# Patient Record
Sex: FEMALE | Race: WHITE | NOT HISPANIC OR LATINO | Employment: OTHER | URBAN - METROPOLITAN AREA
[De-identification: names, ages, dates, MRNs, and addresses within clinical notes are randomized per-mention and may not be internally consistent; named-entity substitution may affect disease eponyms.]

---

## 2018-04-24 RX ORDER — PRAVASTATIN SODIUM 40 MG/1
TABLET ORAL
Qty: 90 TABLET | Refills: 1 | Status: SHIPPED | OUTPATIENT
Start: 2018-04-24 | End: 2018-09-06 | Stop reason: SDUPTHER

## 2018-05-02 ENCOUNTER — TELEPHONE (OUTPATIENT)
Dept: FAMILY MEDICINE | Facility: CLINIC | Age: 68
End: 2018-05-02

## 2018-05-09 ENCOUNTER — OFFICE VISIT (OUTPATIENT)
Dept: FAMILY MEDICINE | Facility: CLINIC | Age: 68
End: 2018-05-09
Payer: MEDICARE

## 2018-05-09 VITALS
OXYGEN SATURATION: 100 % | TEMPERATURE: 99 F | BODY MASS INDEX: 25.11 KG/M2 | WEIGHT: 160 LBS | HEIGHT: 67 IN | RESPIRATION RATE: 16 BRPM | SYSTOLIC BLOOD PRESSURE: 104 MMHG | HEART RATE: 80 BPM | DIASTOLIC BLOOD PRESSURE: 62 MMHG

## 2018-05-09 DIAGNOSIS — K21.00 GASTROESOPHAGEAL REFLUX DISEASE WITH ESOPHAGITIS: Primary | ICD-10-CM

## 2018-05-09 PROCEDURE — 3078F DIAST BP <80 MM HG: CPT | Mod: CPTII,S$GLB,, | Performed by: FAMILY MEDICINE

## 2018-05-09 PROCEDURE — 99999 PR PBB SHADOW E&M-EST. PATIENT-LVL III: CPT | Mod: PBBFAC,,, | Performed by: FAMILY MEDICINE

## 2018-05-09 PROCEDURE — 3074F SYST BP LT 130 MM HG: CPT | Mod: CPTII,S$GLB,, | Performed by: FAMILY MEDICINE

## 2018-05-09 PROCEDURE — 99214 OFFICE O/P EST MOD 30 MIN: CPT | Mod: S$GLB,,, | Performed by: FAMILY MEDICINE

## 2018-05-09 RX ORDER — CEFDINIR 300 MG/1
CAPSULE ORAL
COMMUNITY
Start: 2018-05-08 | End: 2018-06-11 | Stop reason: ALTCHOICE

## 2018-05-09 RX ORDER — PANTOPRAZOLE SODIUM 40 MG/1
TABLET, DELAYED RELEASE ORAL
COMMUNITY
End: 2018-08-29

## 2018-05-09 RX ORDER — ESTRADIOL 0.5 MG/1
TABLET ORAL
COMMUNITY
End: 2020-01-13

## 2018-05-09 RX ORDER — LEVOTHYROXINE SODIUM 150 UG/1
TABLET ORAL
COMMUNITY
End: 2018-06-11 | Stop reason: SDUPTHER

## 2018-05-09 RX ORDER — VIT C/E/ZN/COPPR/LUTEIN/ZEAXAN 250MG-90MG
CAPSULE ORAL
COMMUNITY
End: 2020-01-13

## 2018-05-09 RX ORDER — METHYLPREDNISOLONE 4 MG/1
TABLET ORAL
COMMUNITY
Start: 2018-05-08 | End: 2018-06-11 | Stop reason: ALTCHOICE

## 2018-05-09 RX ORDER — FLUTICASONE PROPIONATE 50 MCG
SPRAY, SUSPENSION (ML) NASAL
COMMUNITY
Start: 2018-05-08 | End: 2018-06-11 | Stop reason: SDUPTHER

## 2018-05-09 RX ORDER — GABAPENTIN 300 MG/1
CAPSULE ORAL
COMMUNITY
Start: 2018-04-21 | End: 2018-08-17 | Stop reason: SDUPTHER

## 2018-05-09 RX ORDER — PROMETHAZINE HYDROCHLORIDE AND DEXTROMETHORPHAN HYDROBROMIDE 6.25; 15 MG/5ML; MG/5ML
5 SYRUP ORAL NIGHTLY
Qty: 240 ML | Refills: 1 | Status: SHIPPED | OUTPATIENT
Start: 2018-05-09 | End: 2018-06-11 | Stop reason: SINTOL

## 2018-05-09 RX ORDER — CALCIUM CARBONATE 500(1250)
TABLET,CHEWABLE ORAL
COMMUNITY
End: 2018-06-11 | Stop reason: ALTCHOICE

## 2018-05-09 NOTE — PROGRESS NOTES
"Subjective:       Patient ID: Kathi Head is a 67 y.o. female.    Chief Complaint: Gastroesophageal Reflux    Patient complains of dysphagia, mild, improved with ppi and carafate. However, she complains of coughing in the middle of the night, waking up to "spit out" clear fluid. No fever.      Review of Systems   Constitutional: Negative for activity change, appetite change, chills, fatigue and fever.   HENT: Negative for congestion, dental problem, facial swelling, nosebleeds, postnasal drip, sinus pain, sore throat, trouble swallowing and voice change.    Eyes: Negative for pain, discharge and visual disturbance.   Respiratory: Positive for cough. Negative for apnea, chest tightness and shortness of breath.    Cardiovascular: Negative for chest pain and palpitations.   Gastrointestinal: Negative for abdominal pain, blood in stool, constipation and nausea.   Endocrine: Negative for cold intolerance, polydipsia and polyuria.   Genitourinary: Negative for difficulty urinating, enuresis and flank pain.   Musculoskeletal: Negative for arthralgias and back pain.   Skin: Negative for color change.   Allergic/Immunologic: Negative for environmental allergies and immunocompromised state.   Neurological: Negative for dizziness and light-headedness.   Hematological: Negative for adenopathy.   Psychiatric/Behavioral: Negative for agitation, behavioral problems, decreased concentration and dysphoric mood. The patient is not nervous/anxious.    All other systems reviewed and are negative.        Reviewed family, medical, surgical, and social history.    Objective:      /62 (BP Location: Left arm, Patient Position: Sitting, BP Method: Medium (Automatic))   Pulse 80   Temp 98.5 °F (36.9 °C)   Resp 16   Ht 5' 7" (1.702 m)   Wt 72.6 kg (160 lb)   SpO2 100%   BMI 25.06 kg/m²   Physical Exam   Constitutional: She is oriented to person, place, and time. She appears well-developed and well-nourished. No distress.   HENT: "   Head: Normocephalic and atraumatic.   Nose: Nose normal.   Mouth/Throat: Oropharynx is clear and moist. No oropharyngeal exudate.   Eyes: Conjunctivae and EOM are normal. Pupils are equal, round, and reactive to light. No scleral icterus.   Neck: Normal range of motion. Neck supple. No thyromegaly present.   Cardiovascular: Normal rate, regular rhythm and normal heart sounds.  Exam reveals no gallop and no friction rub.    No murmur heard.  Pulmonary/Chest: Effort normal and breath sounds normal. No respiratory distress. She has no wheezes. She has no rales. She exhibits no tenderness.   Abdominal: Soft. Bowel sounds are normal. She exhibits no distension. There is no tenderness. There is no guarding.   Musculoskeletal: Normal range of motion. She exhibits no edema, tenderness or deformity.   Lymphadenopathy:     She has no cervical adenopathy.   Neurological: She is alert and oriented to person, place, and time. She displays normal reflexes. No cranial nerve deficit or sensory deficit. She exhibits normal muscle tone.   Skin: Skin is warm and dry. No rash noted. She is not diaphoretic. No erythema. No pallor.   Psychiatric: She has a normal mood and affect. Her behavior is normal. Judgment and thought content normal.   Nursing note and vitals reviewed.      Assessment:       1. Gastroesophageal reflux disease with esophagitis        Plan:       Gastroesophageal reflux disease with esophagitis  -     promethazine-dextromethorphan (PROMETHAZINE-DM) 6.25-15 mg/5 mL Syrp; Take 5 mLs by mouth every evening.  Dispense: 240 mL; Refill: 1  - Will try cough suppressant at night, patient recently with egd.    25 minutes was spent face to face, with over half in counseling.          Risks, benefits, and side effects were discussed with the patient. All questions were answered to the fullest satisfaction of the patient, and pt verbalized understanding and agreement to treatment plan. Pt was to call with any new or worsening  symptoms, or present to the ER.

## 2018-06-08 ENCOUNTER — TELEPHONE (OUTPATIENT)
Dept: FAMILY MEDICINE | Facility: CLINIC | Age: 68
End: 2018-06-08

## 2018-06-08 NOTE — TELEPHONE ENCOUNTER
----- Message from Sabi Anderson sent at 6/8/2018  8:17 AM CDT -----  Pt had a sharp pain in the  back of her head last night (came and went)... Her bottom lip has a twitch ? Not sure if it is related to the pain ... Call [phone] to advise / scheduled appt for Monday am

## 2018-06-11 ENCOUNTER — OFFICE VISIT (OUTPATIENT)
Dept: FAMILY MEDICINE | Facility: CLINIC | Age: 68
End: 2018-06-11
Payer: MEDICARE

## 2018-06-11 VITALS
DIASTOLIC BLOOD PRESSURE: 65 MMHG | WEIGHT: 161 LBS | TEMPERATURE: 98 F | HEART RATE: 76 BPM | HEIGHT: 67 IN | OXYGEN SATURATION: 98 % | RESPIRATION RATE: 18 BRPM | SYSTOLIC BLOOD PRESSURE: 123 MMHG | BODY MASS INDEX: 25.27 KG/M2

## 2018-06-11 DIAGNOSIS — Z23 NEED FOR SHINGLES VACCINE: ICD-10-CM

## 2018-06-11 DIAGNOSIS — J31.0 CHRONIC RHINITIS: ICD-10-CM

## 2018-06-11 DIAGNOSIS — K21.00 GASTROESOPHAGEAL REFLUX DISEASE WITH ESOPHAGITIS: Chronic | ICD-10-CM

## 2018-06-11 DIAGNOSIS — Z79.899 ENCOUNTER FOR LONG-TERM (CURRENT) USE OF MEDICATIONS: Primary | ICD-10-CM

## 2018-06-11 DIAGNOSIS — R51.9 OCCIPITAL PAIN: ICD-10-CM

## 2018-06-11 PROBLEM — E03.9 HYPOTHYROIDISM: Status: ACTIVE | Noted: 2018-06-11

## 2018-06-11 PROBLEM — E78.5 HYPERLIPIDEMIA: Status: ACTIVE | Noted: 2018-06-11

## 2018-06-11 PROCEDURE — 3074F SYST BP LT 130 MM HG: CPT | Mod: CPTII,S$GLB,, | Performed by: FAMILY MEDICINE

## 2018-06-11 PROCEDURE — 3078F DIAST BP <80 MM HG: CPT | Mod: CPTII,S$GLB,, | Performed by: FAMILY MEDICINE

## 2018-06-11 PROCEDURE — 99214 OFFICE O/P EST MOD 30 MIN: CPT | Mod: S$GLB,,, | Performed by: FAMILY MEDICINE

## 2018-06-11 RX ORDER — FLUTICASONE PROPIONATE 50 MCG
2 SPRAY, SUSPENSION (ML) NASAL DAILY
Qty: 16 G | Refills: 5 | Status: SHIPPED | OUTPATIENT
Start: 2018-06-11 | End: 2018-11-07 | Stop reason: ALTCHOICE

## 2018-06-11 RX ORDER — SUCRALFATE 1 G/1
1 TABLET ORAL 4 TIMES DAILY
COMMUNITY
End: 2018-08-29

## 2018-06-11 NOTE — PROGRESS NOTES
"Subjective:       Patient ID: Kathi Head is a 67 y.o. female.    Chief Complaint: Follow-up    HPI   Ms. Head presents for follow-up. Reports her GERD is better since starting flonase; needs refills.     Has not had the shingles vaccine; had left-sided shingles 3 years ago. Has had shooting pains in the back of her head on the left side off and on for the past week. No associated numbness or facial weakness; has had some left lip twitching.     Due for labs in approx 2 months.    Review of Systems   Constitutional: Negative for chills, fatigue, fever and unexpected weight change.   HENT: Positive for congestion and postnasal drip. Negative for sore throat and trouble swallowing.    Respiratory: Negative for cough, chest tightness, shortness of breath and wheezing.    Cardiovascular: Negative for chest pain, palpitations and leg swelling.   Gastrointestinal: Negative for abdominal pain, constipation, diarrhea and nausea.   Neurological: Negative for dizziness, tremors, seizures, weakness and headaches.       Past Medical History:   Diagnosis Date    Allergy     GERD (gastroesophageal reflux disease)     Hyperlipidemia      Past Surgical History:   Procedure Laterality Date    APPENDECTOMY      HYSTERECTOMY      THYROID SURGERY       Social History     Social History    Marital status:      Spouse name: N/A    Number of children: N/A    Years of education: N/A     Occupational History    Not on file.     Social History Main Topics    Smoking status: Never Smoker    Smokeless tobacco: Never Used    Alcohol use No    Drug use: No    Sexual activity: Yes     Partners: Male     Birth control/ protection: Post-menopausal     Other Topics Concern    Not on file     Social History Narrative    No narrative on file     No family history on file.    Objective:      /65 (BP Location: Left arm, Patient Position: Sitting)   Pulse 76   Temp 98.2 °F (36.8 °C) (Tympanic)   Resp 18   Ht 5' 7" " (1.702 m)   Wt 73 kg (161 lb)   LMP  (LMP Unknown)   SpO2 98%   BMI 25.22 kg/m²   Physical Exam   Constitutional: She is oriented to person, place, and time. She appears well-developed and well-nourished. No distress.   Eyes: Conjunctivae and EOM are normal. Pupils are equal, round, and reactive to light. No scleral icterus.   Neurological: She is alert and oriented to person, place, and time.   Skin: Skin is warm and dry. No rash noted. She is not diaphoretic.   Psychiatric: She has a normal mood and affect. Her behavior is normal. Judgment and thought content normal.   Vitals reviewed.      Assessment:       1. Encounter for long-term (current) use of medications    2. Gastroesophageal reflux disease with esophagitis    3. Occipital pain    4. Chronic rhinitis    5. Need for shingles vaccine        Plan:       Encounter for long-term (current) use of medications  -     TSH; Future; Expected date: 08/06/2018  -     Comprehensive metabolic panel; Future; Expected date: 08/06/2018  -     Lipid panel; Future; Expected date: 08/06/2018  -     CBC auto differential; Future; Expected date: 08/06/2018    Gastroesophageal reflux disease with esophagitis    Occipital pain    Chronic rhinitis  -     fluticasone (FLONASE) 50 mcg/actuation nasal spray; 2 sprays (100 mcg total) by Each Nare route once daily.  Dispense: 16 g; Refill: 5    Need for shingles vaccine  -     varicella-zoster [id number], PF, (SHINGRIX, PF,) 50 mcg/0.5 mL injection; Inject 0.5 mLs into the muscle once.  Dispense: 0.5 mL; Refill: 0            Risks, benefits, and side effects were discussed with the patient. All questions were answered to the fullest satisfaction of the patient, and pt verbalized understanding and agreement to treatment plan. Pt was to call with any new or worsening symptoms, or present to the ER.

## 2018-06-12 RX ORDER — LEVOTHYROXINE SODIUM 150 UG/1
TABLET ORAL
Qty: 90 TABLET | Refills: 1 | Status: SHIPPED | OUTPATIENT
Start: 2018-06-12 | End: 2018-08-10 | Stop reason: SDUPTHER

## 2018-07-26 DIAGNOSIS — Z12.39 SCREENING BREAST EXAMINATION: Primary | ICD-10-CM

## 2018-07-31 ENCOUNTER — HOSPITAL ENCOUNTER (OUTPATIENT)
Dept: RADIOLOGY | Facility: HOSPITAL | Age: 68
Discharge: HOME | End: 2018-07-31
Attending: OBSTETRICS & GYNECOLOGY
Payer: MEDICARE

## 2018-07-31 VITALS — HEIGHT: 67 IN | BODY MASS INDEX: 23.7 KG/M2 | WEIGHT: 151 LBS

## 2018-07-31 DIAGNOSIS — Z12.39 SCREENING BREAST EXAMINATION: ICD-10-CM

## 2018-07-31 PROCEDURE — 77067 SCR MAMMO BI INCL CAD: CPT | Mod: 26,,, | Performed by: RADIOLOGY

## 2018-07-31 PROCEDURE — 77067 SCR MAMMO BI INCL CAD: CPT | Mod: TC

## 2018-08-10 ENCOUNTER — OFFICE VISIT (OUTPATIENT)
Dept: FAMILY MEDICINE | Facility: CLINIC | Age: 68
End: 2018-08-10
Payer: MEDICARE

## 2018-08-10 VITALS
SYSTOLIC BLOOD PRESSURE: 110 MMHG | HEART RATE: 73 BPM | BODY MASS INDEX: 25.11 KG/M2 | WEIGHT: 160 LBS | TEMPERATURE: 98 F | HEIGHT: 67 IN | DIASTOLIC BLOOD PRESSURE: 64 MMHG | OXYGEN SATURATION: 100 %

## 2018-08-10 DIAGNOSIS — E03.9 HYPOTHYROIDISM, UNSPECIFIED TYPE: ICD-10-CM

## 2018-08-10 DIAGNOSIS — E16.2 HYPOGLYCEMIA: ICD-10-CM

## 2018-08-10 DIAGNOSIS — K21.00 GASTROESOPHAGEAL REFLUX DISEASE WITH ESOPHAGITIS: Primary | Chronic | ICD-10-CM

## 2018-08-10 PROBLEM — M77.31 HEEL SPUR, RIGHT: Status: ACTIVE | Noted: 2018-08-10

## 2018-08-10 PROCEDURE — 3074F SYST BP LT 130 MM HG: CPT | Mod: CPTII,S$GLB,, | Performed by: FAMILY MEDICINE

## 2018-08-10 PROCEDURE — 3078F DIAST BP <80 MM HG: CPT | Mod: CPTII,S$GLB,, | Performed by: FAMILY MEDICINE

## 2018-08-10 PROCEDURE — 99214 OFFICE O/P EST MOD 30 MIN: CPT | Mod: S$GLB,,, | Performed by: FAMILY MEDICINE

## 2018-08-10 RX ORDER — LEVOTHYROXINE SODIUM 137 UG/1
150 TABLET ORAL EVERY MORNING
Qty: 90 TABLET | Refills: 1 | Status: SHIPPED | OUTPATIENT
Start: 2018-08-10 | End: 2018-09-24 | Stop reason: DRUGHIGH

## 2018-08-10 NOTE — PROGRESS NOTES
"Subjective:       Patient ID: Kathi Head is a 67 y.o. female.    Chief Complaint: Follow-up (2 month f/u)    HPI   Presents for follow-up. Still with significant heart burn at night. Also with hoarseness.Taking carafate and pantoprazole as prescribed.     Complains of intermittent hypoglycemic episodes. Resolves with candy.     Labs reviewed. CBC, cmp, lipid panel within normal limits. TSH is low at 0.411.     Review of Systems   Constitutional: Negative for fatigue, fever and unexpected weight change.   Respiratory: Negative for cough, chest tightness, shortness of breath and wheezing.    Cardiovascular: Negative for chest pain, palpitations and leg swelling.   Gastrointestinal: Negative for abdominal pain, diarrhea, nausea and vomiting.        Indigestion   Neurological: Negative for dizziness, tremors, seizures and headaches.       Past Medical History:   Diagnosis Date    Allergy     GERD (gastroesophageal reflux disease)     Hyperlipidemia      Past Surgical History:   Procedure Laterality Date    APPENDECTOMY      HYSTERECTOMY      THYROID SURGERY       Social History     Social History    Marital status:      Spouse name: N/A    Number of children: N/A    Years of education: N/A     Occupational History    Not on file.     Social History Main Topics    Smoking status: Never Smoker    Smokeless tobacco: Never Used    Alcohol use No    Drug use: No    Sexual activity: Yes     Partners: Male     Birth control/ protection: Post-menopausal     Other Topics Concern    Not on file     Social History Narrative    No narrative on file     Family History   Problem Relation Age of Onset    Breast cancer Neg Hx        Objective:      /64 (BP Location: Left arm, Patient Position: Sitting, BP Method: Large (Automatic))   Pulse 73   Temp 98.1 °F (36.7 °C) (Tympanic)   Ht 5' 7" (1.702 m)   Wt 72.6 kg (160 lb)   LMP  (LMP Unknown)   SpO2 100%   BMI 25.06 kg/m²   Physical Exam "   Constitutional: She is oriented to person, place, and time. She appears well-developed and well-nourished. No distress.   Eyes: Conjunctivae and EOM are normal. Pupils are equal, round, and reactive to light. No scleral icterus.   Neurological: She is alert and oriented to person, place, and time.   Skin: Skin is warm and dry. No rash noted. She is not diaphoretic.   Psychiatric: She has a normal mood and affect. Her behavior is normal. Judgment and thought content normal.   Vitals reviewed.      Assessment:       1. Gastroesophageal reflux disease with esophagitis    2. Hypoglycemia    3. Hypothyroidism, unspecified type        Plan:       Gastroesophageal reflux disease with esophagitis  -     Ambulatory referral to Gastroenterology    Hypoglycemia  - patient encouraged to eat protein with every meal    Hypothyroidism, unspecified type  -     Changing dose from 150 to 137 mcg  -     levothyroxine (SYNTHROID) 137 MCG Tab tablet; Take 1 tablet (137 mcg total) by mouth every morning.  Dispense: 90 tablet; Refill: 1  -     Comprehensive metabolic panel; Future; Expected date: 01/10/2019  -     TSH; Future; Expected date: 01/10/2019  -     T4, free; Future; Expected date: 01/10/2019            Risks, benefits, and side effects were discussed with the patient. All questions were answered to the fullest satisfaction of the patient, and pt verbalized understanding and agreement to treatment plan. Pt was to call with any new or worsening symptoms, or present to the ER.

## 2018-08-14 ENCOUNTER — OFFICE VISIT (OUTPATIENT)
Dept: PODIATRY | Facility: CLINIC | Age: 68
End: 2018-08-14
Payer: MEDICARE

## 2018-08-14 VITALS
WEIGHT: 165 LBS | SYSTOLIC BLOOD PRESSURE: 95 MMHG | BODY MASS INDEX: 25.9 KG/M2 | DIASTOLIC BLOOD PRESSURE: 61 MMHG | HEIGHT: 67 IN | HEART RATE: 73 BPM

## 2018-08-14 DIAGNOSIS — M77.31 CALCANEAL SPUR OF FOOT, RIGHT: ICD-10-CM

## 2018-08-14 DIAGNOSIS — M76.61 ACHILLES TENDINITIS OF RIGHT LOWER EXTREMITY: Primary | ICD-10-CM

## 2018-08-14 PROCEDURE — 99213 OFFICE O/P EST LOW 20 MIN: CPT | Mod: S$GLB,,, | Performed by: PODIATRIST

## 2018-08-14 PROCEDURE — 99999 PR PBB SHADOW E&M-EST. PATIENT-LVL III: CPT | Mod: PBBFAC,,, | Performed by: PODIATRIST

## 2018-08-14 PROCEDURE — 3074F SYST BP LT 130 MM HG: CPT | Mod: CPTII,S$GLB,, | Performed by: PODIATRIST

## 2018-08-14 PROCEDURE — 3078F DIAST BP <80 MM HG: CPT | Mod: CPTII,S$GLB,, | Performed by: PODIATRIST

## 2018-08-14 RX ORDER — LEVOTHYROXINE SODIUM 150 UG/1
TABLET ORAL
COMMUNITY
End: 2018-08-31

## 2018-08-14 RX ORDER — ESTRADIOL 0.5 MG/1
TABLET ORAL
COMMUNITY
End: 2018-08-29

## 2018-08-14 RX ORDER — PANTOPRAZOLE SODIUM 40 MG/1
TABLET, DELAYED RELEASE ORAL
COMMUNITY
End: 2018-12-24

## 2018-08-14 RX ORDER — SUCRALFATE 1 G/1
TABLET ORAL
COMMUNITY
End: 2019-08-29

## 2018-08-17 RX ORDER — GABAPENTIN 300 MG/1
300 CAPSULE ORAL DAILY
Qty: 90 CAPSULE | Refills: 1 | Status: SHIPPED | OUTPATIENT
Start: 2018-08-17 | End: 2019-02-15

## 2018-08-19 NOTE — PROGRESS NOTES
Subjective:       Patient ID: Kathi Head is a 67 y.o. female.    Chief Complaint: Follow-up; Foot Pain; and Foot Problem    HPI Patient presents for followup Achilles tendinitis and posterior heel spurring right    Review of Systems  Patient reports no fever, no night sweats, no significant weight gain, no significant weight loss, and no exercise intolerance. She reports no dry eyes, no irritation, and no vision change. She reports no difficulty hearing and no ear pain. She reports no frequent nosebleeds and no nose/sinus problems. She reports no sore throat, no bleeding gums, no snoring, no dry mouth, no mouth ulcers, no oral abnormalities, and no teeth problems. She reports no chest pain, no arm pain on exertion, no shortness of breath when walking, no shortness of breath when lying down, no palpitations, and no known heart murmur. She reports no cough, no wheezing, no shortness of breath, and no coughing up blood. She reports no abdominal pain, no vomiting, normal appetite, no diarrhea, and not vomiting blood. She reports no incontinence, no difficulty urinating, no hematuria, and no increased frequency. She reports no muscle aches, no muscle weakness, no arthralgias/joint pain, no back pain, and no swelling in the extremities. She reports no abnormal mole, no jaundice, and no rashes. She reports no loss of consciousness, no weakness, no numbness, no seizures, no dizziness, and no headaches. She reports no depression, no sleep disturbances, feeling safe in relationship, and no alcohol abuse. She reports no fatigue. She reports no swollen glands and no bruising. She reports no runny nose, no sinus pressure, no itching, no hives, and no frequent sneezing.     Objective:      Physical Exam  Constitutional: General Appearance: healthy-appearing, NAD, and normal body habitus.   Psychiatric: Orientation: oriented to time, place, and person. Mood and Affect: normal mood and affect and active and alert.    Cardiovascular System: Arterial Pulses Right: posterior tibialis normal and dorsalis pedis normal. Arterial Pulses Left: posterior tibialis normal and dorsalis pedis normal. Edema Right: no edema. Edema Left: no edema. Varicosities Right: no varicosities and capillary refill test normal. Varicosities Left: no varicosities and capillary refill test normal.   Gait and Station: Appearance: normal gait, no limp, and ambulating with no assistive devices.   Ankles and Feet: Inspection Right: no erythema, induration, swelling, or warmth and normal alignment and deformity. Inspection Left: no erythema or induration and normal alignment. Bony Palpation of the Ankle/Foot Right: no tenderness of the sesamoids, the ankle, the calcaneal tuberosity, the metatarsals, the navicular tuberosity, the tarsometatarsal joints, the dome of talus, the head of talus, or the inferior tibiofibular joint and tenderness of the achilles tendon insertion. Bony Palpation of the Ankle/Foot Left: no tenderness of the sesamoids, the ankle, the calcaneal tuberosity, the metatarsals, the tarsometatarsal joints, the navicular tuberosity, the dome of talus, the head of talus, the inferior tibiofibular joint, or the achilles tendon insertion. Soft Tissue Palpation of the Ankle/Foot Right: no tenderness of the tibialis posterior, the tibialis anterior, the plantar fascia, the peroneus longus and brevis, the extensor hallucis longus, the sinus tarsi, the lateral anterior talofibular ligament, the anterior talofibular ligament, the calcaneofibular ligament, the posterior talofibular ligament, the peroneal retinaculum, the deltoid ligament, or the spring ligament and tenderness of the achilles tendon and the retrocalcaneal bursae. Soft Tissue Palpation of the Ankle/Foot Left: no tenderness of the tibialis posterior, the tibialis anterior, the plantar fascia, the achilles tendon, the peroneus longus and brevis, the extensor hallucis longus, the sinus tarsi,  the lateral anterior talofibular ligament, the anterior talofibular ligament, the calcaneofibular ligament, the posterior talofibular ligament, the peroneal retinaculum, the deltoid ligament, the spring ligament, or the retrocalcaneal bursae. Active Range of Motion Right: great toe flexion normal and extension normal and dorsiflexion normal, plantar flexion normal, inversion normal, and eversion normal. Active Range of Motion Left: great toe flexion normal and extension normal and dorsiflexion normal, plantar flexion normal, inversion normal, and eversion normal. Stability Right: anterior drawer negative and talar tilt negative. Stability Left: anterior drawer negative and talar tilt negative. Strength Right: extensor digitorum longus (5/5) and brevis (5/5); extensor hallucis longus (5/5); peroneus longus (5/5) and brevis (5/5); and posterior tibialis (5/5), tibialis anterior (5/5), and gastrocnemius (5/5). Strength Left: extensor digitorum longus (5/5) and brevis (5/5); extensor hallucis longus (5/5); peroneus longus (5/5) and brevis (5/5); and posterior tibialis (5/5), tibialis anterior (5/5), and gastrocnemius (5/5). Inspection of the Toes Right: no callus, claw toes, or hammer toes. Inspection of the Toes Left: no callus, claw toes, or hammer toes. Palpation and Stability of the Toes Right: no tenderness of the great toe, the second toe, the third toe, the fourth toe, or the fifth toe and anterior drawer negative. Palpation and Stability of the Toes Left: no tenderness of the great toe, the second toe, the third toe, the fourth toe, or the fifth toe and anterior drawer negative.   Neurological System: Coordination: heel-to-shin normal. Ankle Reflex Right: normal (2). Ankle Reflex Left: normal (2). Babinski Reflex Right: no plantar reflex or babinski reflex. Babinski Reflex Left: no plantar reflex or babinski reflex. Sensation on the Right: normal at the lateral plantar nerve, the medial plantar nerve, the  superficial peroneal nerve, the deep peroneal nerve, the sural nerve, and the saphenous nerve; normal distal extremities; and [place] 5.08. Sensation on the Left: normal at the lateral plantar nerve, the medial plantar nerve, the superficial peroneal nerve, the deep peroneal nerve, the sural nerve, and the saphenous nerve; normal distal extremities; and [place] 5.08. Special Tests on the Right: Lindsay's test negative, Hoffa's test negative, Tinel's test negative, external rotation test negative, and squeeze test negative. Special Tests on the Left: Lindsay's test negative, Hoffa's test negative, Tinel's test negative, external rotation test negative, and squeeze test negative.   Skin: Right Lower Extremity: normal. Left Lower Extremity: normal.   Following evaluation I advised the patient appears that the majority of her discomfort is at the Achilles tendon insertion not the plantar fascial insertion patient has minimal erythema and edema along the Achilles tendon and at the insertion of the Achilles tendon right foot.There is also palpable posterior calcaneal spurring noted in this region I am able to palpate the Achilles tendon so it does not show signs of being ruptured at this time.     Assessment:       1. Achilles tendinitis of right lower extremity    2. Calcaneal spur of foot, right        Plan:         Following evaluation patient has severe posterior calcaneal spurring with increased inflammation and pain at the Achilles tendon insertion the spurring is very palpable at the posterior aspect of the right calcaneus patient indicates that she has recently lost 30 lb she thought this might help her situation however her foot has remained severely painful to the point where she can barely stand to be on her feet for any period of time.  Patient states that she has been on gabapentin for quite a while because of shingles and has a history of Acid reflux so she cannot take any  anti-inflammatories.   Following evaluation I made some recommendations as far as a heel lift that the patient can wear bilateral to her try to take pressure off of the Achilles tendon insertion right ultimately want to see the patient in my other office for x-rays to further evaluate this area the patient was advised treatment may require surgical intervention because this has been a chronic ongoing problem I last saw the patient for this problem approximately 1 year ago.  Further evaluation and x-rays at follow-up.

## 2018-08-29 ENCOUNTER — OFFICE VISIT (OUTPATIENT)
Dept: PODIATRY | Facility: CLINIC | Age: 68
End: 2018-08-29
Payer: MEDICARE

## 2018-08-29 ENCOUNTER — HOSPITAL ENCOUNTER (OUTPATIENT)
Dept: RADIOLOGY | Facility: HOSPITAL | Age: 68
Discharge: HOME | End: 2018-08-29
Attending: PODIATRIST
Payer: MEDICARE

## 2018-08-29 VITALS
TEMPERATURE: 98 F | HEART RATE: 70 BPM | OXYGEN SATURATION: 100 % | HEIGHT: 67 IN | SYSTOLIC BLOOD PRESSURE: 122 MMHG | DIASTOLIC BLOOD PRESSURE: 70 MMHG | RESPIRATION RATE: 18 BRPM | WEIGHT: 165 LBS | BODY MASS INDEX: 25.9 KG/M2

## 2018-08-29 DIAGNOSIS — M77.31 CALCANEAL SPUR OF FOOT, RIGHT: ICD-10-CM

## 2018-08-29 DIAGNOSIS — M76.61 ACHILLES TENDINITIS OF RIGHT LOWER EXTREMITY: Primary | ICD-10-CM

## 2018-08-29 DIAGNOSIS — M77.31 HEEL SPUR, RIGHT: ICD-10-CM

## 2018-08-29 PROCEDURE — 99213 OFFICE O/P EST LOW 20 MIN: CPT | Mod: S$PBB,,, | Performed by: PODIATRIST

## 2018-08-29 PROCEDURE — 73630 X-RAY EXAM OF FOOT: CPT | Mod: 26,RT,, | Performed by: RADIOLOGY

## 2018-08-29 PROCEDURE — 73630 X-RAY EXAM OF FOOT: CPT | Mod: TC,FY,RT

## 2018-08-29 PROCEDURE — 3078F DIAST BP <80 MM HG: CPT | Mod: CPTII,,, | Performed by: PODIATRIST

## 2018-08-29 PROCEDURE — 99999 PR PBB SHADOW E&M-EST. PATIENT-LVL III: CPT | Mod: PBBFAC,,, | Performed by: PODIATRIST

## 2018-08-29 PROCEDURE — 3074F SYST BP LT 130 MM HG: CPT | Mod: CPTII,,, | Performed by: PODIATRIST

## 2018-08-31 ENCOUNTER — OFFICE VISIT (OUTPATIENT)
Dept: DERMATOLOGY | Facility: CLINIC | Age: 68
End: 2018-08-31
Payer: MEDICARE

## 2018-08-31 VITALS — WEIGHT: 165 LBS | HEIGHT: 67 IN | BODY MASS INDEX: 25.9 KG/M2

## 2018-08-31 DIAGNOSIS — L82.1 SEBORRHEIC KERATOSES: ICD-10-CM

## 2018-08-31 DIAGNOSIS — D22.9 MULTIPLE BENIGN NEVI: ICD-10-CM

## 2018-08-31 DIAGNOSIS — L81.4 SOLAR LENTIGO: Primary | ICD-10-CM

## 2018-08-31 PROCEDURE — 99999 PR PBB SHADOW E&M-EST. PATIENT-LVL II: CPT | Mod: PBBFAC,,, | Performed by: DERMATOLOGY

## 2018-08-31 PROCEDURE — 99201 PR OFFICE/OUTPT VISIT,NEW,LEVL I: CPT | Mod: S$PBB,,, | Performed by: DERMATOLOGY

## 2018-08-31 NOTE — PROGRESS NOTES
Subjective:       Patient ID:  Kathi Head is a 67 y.o. female who presents for   Chief Complaint   Patient presents with    Lesion     x's 8 months, nose, tender, 0 tx     Initial visit  H/o rosacea  Shingles 2015 left face with PHN on gabapentin  C/o lesion on nose, noteiced 8 months ago, no bleeding, no scaling, looks brown    No h/o skin cancer  Denies h/o intense sun exposure          Lesion  - Initial  Affected locations: nose  Duration: 8 months  Signs / symptoms: asymptomatic  Severity: mild  Timing: constant  Aggravated by: nothing  Relieving factors/Treatments tried: nothing        Review of Systems   Constitutional: Negative for chills, weight loss and weight gain.   Skin: Negative for daily sunscreen use and wears hat. Activity-Related Sunscreen Use: avoids sun.   Hematologic/Lymphatic: Does not bruise/bleed easily.        Objective:    Physical Exam   Constitutional: She appears well-developed and well-nourished. No distress.   Neurological: She is alert and oriented to person, place, and time. She is not disoriented.   Psychiatric: She has a normal mood and affect.   Skin:   Areas Examined (abnormalities noted in diagram):   Head / Face Inspection Performed              Diagram Legend     Erythematous scaling macule/papule c/w actinic keratosis       Vascular papule c/w angioma      Pigmented verrucoid papule/plaque c/w seborrheic keratosis      Yellow umbilicated papule c/w sebaceous hyperplasia      Irregularly shaped tan macule c/w lentigo     1-2 mm smooth white papules consistent with Milia      Movable subcutaneous cyst with punctum c/w epidermal inclusion cyst      Subcutaneous movable cyst c/w pilar cyst      Firm pink to brown papule c/w dermatofibroma      Pedunculated fleshy papule(s) c/w skin tag(s)      Evenly pigmented macule c/w junctional nevus     Mildly variegated pigmented, slightly irregular-bordered macule c/w mildly atypical nevus      Flesh colored to evenly pigmented papule  c/w intradermal nevus       Pink pearly papule/plaque c/w basal cell carcinoma      Erythematous hyperkeratotic cursted plaque c/w SCC      Surgical scar with no sign of skin cancer recurrence      Open and closed comedones      Inflammatory papules and pustules      Verrucoid papule consistent consistent with wart     Erythematous eczematous patches and plaques     Dystrophic onycholytic nail with subungual debris c/w onychomycosis     Umbilicated papule    Erythematous-base heme-crusted tan verrucoid plaque consistent with inflamed seborrheic keratosis     Erythematous Silvery Scaling Plaque c/w Psoriasis     See annotation      Assessment / Plan:        Solar lentigo  This is a benign hyperpigmented sun induced lesion. Daily sun protection will reduce the number of new lesions. Treatment of these benign lesions are considered cosmetic.    Seborrheic keratoses  These are benign inherited growths without a malignant potential. Reassurance given to patient. No treatment is necessary.     Multiple benign nevi  Discussed ABCDE's of nevi.  Monitor for new mole or moles that are becoming bigger, darker, irritated, or developing irregular borders. Brochure provided.    Patient instructed in importance in daily sun protection of at least spf 30. Sun avoidance and topical protection discussed.   Recommend Elta MD (physician office) COTZ sensitive (available online) for daily use on face and neck.  Patient encouraged to wear hat for all outdoor exposure.   Also discussed sun protective clothing.           No Follow-up on file.

## 2018-09-03 NOTE — PROGRESS NOTES
Subjective:       Patient ID: Kathi Head is a 67 y.o. female.    Chief Complaint: Foot Pain    HPI Patient presents for followup Achilles tendinitis and posterior heel spurring right    Review of Systems  Patient reports no fever, no night sweats, no significant weight gain, no significant weight loss, and no exercise intolerance. She reports no dry eyes, no irritation, and no vision change. She reports no difficulty hearing and no ear pain. She reports no frequent nosebleeds and no nose/sinus problems. She reports no sore throat, no bleeding gums, no snoring, no dry mouth, no mouth ulcers, no oral abnormalities, and no teeth problems. She reports no chest pain, no arm pain on exertion, no shortness of breath when walking, no shortness of breath when lying down, no palpitations, and no known heart murmur. She reports no cough, no wheezing, no shortness of breath, and no coughing up blood. She reports no abdominal pain, no vomiting, normal appetite, no diarrhea, and not vomiting blood. She reports no incontinence, no difficulty urinating, no hematuria, and no increased frequency. She reports no muscle aches, no muscle weakness, no arthralgias/joint pain, no back pain, and no swelling in the extremities. She reports no abnormal mole, no jaundice, and no rashes. She reports no loss of consciousness, no weakness, no numbness, no seizures, no dizziness, and no headaches. She reports no depression, no sleep disturbances, feeling safe in relationship, and no alcohol abuse. She reports no fatigue. She reports no swollen glands and no bruising. She reports no runny nose, no sinus pressure, no itching, no hives, and no frequent sneezing.     Objective:      Physical Exam  Constitutional: General Appearance: healthy-appearing, NAD, and normal body habitus.   Psychiatric: Orientation: oriented to time, place, and person. Mood and Affect: normal mood and affect and active and alert.   Cardiovascular System: Arterial Pulses  Right: posterior tibialis normal and dorsalis pedis normal. Arterial Pulses Left: posterior tibialis normal and dorsalis pedis normal. Edema Right: no edema. Edema Left: no edema. Varicosities Right: no varicosities and capillary refill test normal. Varicosities Left: no varicosities and capillary refill test normal.   Gait and Station: Appearance: normal gait, no limp, and ambulating with no assistive devices.   Ankles and Feet: Inspection Right: no erythema, induration, swelling, or warmth and normal alignment and deformity. Inspection Left: no erythema or induration and normal alignment. Bony Palpation of the Ankle/Foot Right: no tenderness of the sesamoids, the ankle, the calcaneal tuberosity, the metatarsals, the navicular tuberosity, the tarsometatarsal joints, the dome of talus, the head of talus, or the inferior tibiofibular joint and tenderness of the achilles tendon insertion. Bony Palpation of the Ankle/Foot Left: no tenderness of the sesamoids, the ankle, the calcaneal tuberosity, the metatarsals, the tarsometatarsal joints, the navicular tuberosity, the dome of talus, the head of talus, the inferior tibiofibular joint, or the achilles tendon insertion. Soft Tissue Palpation of the Ankle/Foot Right: no tenderness of the tibialis posterior, the tibialis anterior, the plantar fascia, the peroneus longus and brevis, the extensor hallucis longus, the sinus tarsi, the lateral anterior talofibular ligament, the anterior talofibular ligament, the calcaneofibular ligament, the posterior talofibular ligament, the peroneal retinaculum, the deltoid ligament, or the spring ligament and tenderness of the achilles tendon and the retrocalcaneal bursae. Soft Tissue Palpation of the Ankle/Foot Left: no tenderness of the tibialis posterior, the tibialis anterior, the plantar fascia, the achilles tendon, the peroneus longus and brevis, the extensor hallucis longus, the sinus tarsi, the lateral anterior talofibular  ligament, the anterior talofibular ligament, the calcaneofibular ligament, the posterior talofibular ligament, the peroneal retinaculum, the deltoid ligament, the spring ligament, or the retrocalcaneal bursae. Active Range of Motion Right: great toe flexion normal and extension normal and dorsiflexion normal, plantar flexion normal, inversion normal, and eversion normal. Active Range of Motion Left: great toe flexion normal and extension normal and dorsiflexion normal, plantar flexion normal, inversion normal, and eversion normal. Stability Right: anterior drawer negative and talar tilt negative. Stability Left: anterior drawer negative and talar tilt negative. Strength Right: extensor digitorum longus (5/5) and brevis (5/5); extensor hallucis longus (5/5); peroneus longus (5/5) and brevis (5/5); and posterior tibialis (5/5), tibialis anterior (5/5), and gastrocnemius (5/5). Strength Left: extensor digitorum longus (5/5) and brevis (5/5); extensor hallucis longus (5/5); peroneus longus (5/5) and brevis (5/5); and posterior tibialis (5/5), tibialis anterior (5/5), and gastrocnemius (5/5). Inspection of the Toes Right: no callus, claw toes, or hammer toes. Inspection of the Toes Left: no callus, claw toes, or hammer toes. Palpation and Stability of the Toes Right: no tenderness of the great toe, the second toe, the third toe, the fourth toe, or the fifth toe and anterior drawer negative. Palpation and Stability of the Toes Left: no tenderness of the great toe, the second toe, the third toe, the fourth toe, or the fifth toe and anterior drawer negative.   Neurological System: Coordination: heel-to-shin normal. Ankle Reflex Right: normal (2). Ankle Reflex Left: normal (2). Babinski Reflex Right: no plantar reflex or babinski reflex. Babinski Reflex Left: no plantar reflex or babinski reflex. Sensation on the Right: normal at the lateral plantar nerve, the medial plantar nerve, the superficial peroneal nerve, the deep  peroneal nerve, the sural nerve, and the saphenous nerve; normal distal extremities; and [place] 5.08. Sensation on the Left: normal at the lateral plantar nerve, the medial plantar nerve, the superficial peroneal nerve, the deep peroneal nerve, the sural nerve, and the saphenous nerve; normal distal extremities; and [place] 5.08. Special Tests on the Right: Lindsay's test negative, Hoffa's test negative, Tinel's test negative, external rotation test negative, and squeeze test negative. Special Tests on the Left: Lindsay's test negative, Hoffa's test negative, Tinel's test negative, external rotation test negative, and squeeze test negative.   Skin: Right Lower Extremity: normal. Left Lower Extremity: normal.   Following evaluation I advised the patient appears that the majority of her discomfort is at the Achilles tendon insertion not the plantar fascial insertion patient has minimal erythema and edema along the Achilles tendon and at the insertion of the Achilles tendon right foot.There is also palpable posterior calcaneal spurring noted in this region I am able to palpate the Achilles tendon so it does not show signs of being ruptured at this time.     Assessment:       1. Achilles tendinitis of right lower extremity    2. Calcaneal spur of foot, right    3. Heel spur, right        Plan:           Following evaluation patient has severe posterior calcaneal spurring it appears that the spur at the Achilles tendon insertion has started to fracture there is extensive spurring on plain film x-ray at the plantar posterior calcaneus as well as the midfoot right.  Patient is not able to take any anti-inflammatories she has a history of H pylori.  Following evaluation I did provide surgical consultation today I have recommended exhausting all conservative measures 1st after discussing this in detail with the patient she is in agreement with this I have advised her I do believe that physical therapy  will give her significant relief if not complete relief she is going to continue wearing the heel lifts I have referred her to physical therapy ultimately if physical therapy is not relieving her discomfort and inflammation she needs to consider surgical intervention however this would require 6 weeks of nonweightbearing before gradual return to activity.  Patient referred to physical therapy follow-up 3 weeks..

## 2018-09-04 ENCOUNTER — CLINICAL SUPPORT (OUTPATIENT)
Dept: REHABILITATION | Facility: HOSPITAL | Age: 68
End: 2018-09-04
Attending: PODIATRIST
Payer: MEDICARE

## 2018-09-04 DIAGNOSIS — R26.2 DIFFICULTY WALKING: ICD-10-CM

## 2018-09-04 DIAGNOSIS — M77.31 CALCANEAL SPUR OF FOOT, RIGHT: ICD-10-CM

## 2018-09-04 DIAGNOSIS — M76.61 ACHILLES TENDINITIS OF RIGHT LOWER EXTREMITY: Primary | ICD-10-CM

## 2018-09-04 PROCEDURE — 97161 PT EVAL LOW COMPLEX 20 MIN: CPT | Mod: PN

## 2018-09-04 PROCEDURE — G8979 MOBILITY GOAL STATUS: HCPCS | Mod: CI,PN

## 2018-09-04 PROCEDURE — G8978 MOBILITY CURRENT STATUS: HCPCS | Mod: CJ,PN

## 2018-09-04 NOTE — PLAN OF CARE
"  TIME RECORD    Date: 09/04/2018    Start Time:  2:05 PM  Stop Time:  2:50 PM  OUTPATIENT PHYSICAL THERAPY   PATIENT EVALUATION  Onset Date: January 2018  Primary Diagnosis:   1. Achilles tendinitis of right lower extremity     2. Calcaneal spur of foot, right     3. Difficulty walking       Treatment Diagnosis: Difficulty walking  Past Medical History:   Diagnosis Date    Allergy     GERD (gastroesophageal reflux disease)     Hyperlipidemia      Precautions: standard  Prior Therapy: Kathi has received therapy at this clinic several times in the past 10 years for cervical pain with good outcomes.   Medications: Kathi Head has a current medication list which includes the following prescription(s): cholecalciferol (vitamin d3), estradiol, fluticasone, gabapentin, levothyroxine, pantoprazole, pravastatin, propylene glycol, and sucralfate.  Nutrition:  Normal; she has history of acid reflux which has not been controlled with present medication; she has consult with GI specialist in 2 weeks.   History of Present Illness: Kathi reports that her foot has been bothering her off and on since January of this year.  She stated that she used to walk for exercise (several years ago) and has tried to get back to it in the past year, but now is unable secondary to pain with walking more than 10-15 mins.  She stated that she has no pain first thing in the morning, pain occurs after she is up for awhile and walking around and is in the back of her heel where achilles attaches.  She like to wear "flip flops" but has switched to tennis shoes thinking this would give her better support. She has thin heel pads in her present shoes with open back that she got from Dr. Plaza. She told me that she wants to try therapy to see if this would help her, if not, she will probably have surgery.   Prior Level of Function: Independent  Social History: Kathi lives with her , Dax.  She is retired; single story home. Independent "   Place of Residence (Steps/Adaptations): single story home  Functional Deficits Leading to Referral/Nature of Injury: increasing calcaneous pain/posterior calf pain with walking.   Patient Therapy Goals: To be able to walk without pain in her foot    Subjective     Kathi Head states she is here to see if therapy can reduce her heel spur pain so that she can avoid surgery.  .    Pain:  Location: feet   Description: Burning  Activities Which Increase Pain: Standing and Walking  Activities Which Decrease Pain: meditation, ice and rest  Pain Scale: 5/10 at best 5/10 now  6/10 at worst    Objective     Posture: Kathi demonstrates good posture; no deficits noted that are impacting her foot  Palpation: tenderness noted over calcaneous/insertion of achilles as well as along medial border of calcaneoust  Sensation: intact to light touch  DTRs: intact  Range of Motion/Strength:   Right foot:  Plantar Flexion 49 degrees; Dorsiflexion: +2; Eversion 5 degrees; Inversion 30 degrees.    Left foot:  Plantar Flexion 55 degrees; Dorsiflexion +6 ; Eversion: 10  degrees; Inversion: 35 degrees   Right/Left hip and knee are WNL  Strength: Right/Left dorsiflexion 5/5; Plantar Flexion: 4/5; Inversion/eversion 4+/5  Bilateral Hip Strength: Abduction 4+/5; otherwise 5/5     Knee: 5/5 quads; 4+/5 hamstrings    Flexibility: good hamstring flexiblity noted; achilles tendon flexible/pliable; no errythema noted; palpable from insertion to Musculotendious junction.   Gait: Without AD  Analysis: slight loss of full heel <>toe excusrion on right foot noted; minimal pain noted right posterior heel today;     Other: LEFS:  Score 57/80 = 29% limitation    Treatment: IASTM performed to Right Achilles tendon and proximally into gastroc; medial/lateral attachments of achilles on calcaneous; Joint mobilization right ankle into dorsiflexion/plantar flexion to address ROM deficits; applied kinesiotpae to right achilles - attached I-strip at met heads on  plantar surface and ran up achilles/calf; lift off strips placed above and below calcaneal bone spur. Patient noted no pain with walking following this.  Had her walk on level surface x 5 mins with no increase in sx's noted.  Instructed her to leave tape on until her next appointment on Thursday 9/6/18. Instructed her in H/S thru heel cord stretching to address all structures along the chain.     Assessment       Initial Assessment (Pertinent finding, problem list and factors affecting outcome): Kathi presents with over 8 month hx of right foot pain - achilles tendinitis with development of calcaneal bone spur; she has had issues with this foot/achilles for over a 1 1/2 year - no treatment to date.  She lost 30+ pounds thinking that this would help with her pain - explained to her that weight loss has helpful, but not the cause/cure of this particular problem. Recommended that she continue with stretching exercises given as well as consider a thicker heel lift for her shoe to protect achilles and reduce excursion of achilles with walking. Kathi demonstrates decreased right ankle dorsi and plantarflexion with gastroc/achilles tightness and weakness.      Rehab Potiential: excellent     G-Code: Functional Limitation Reporting - category Mobility:  Current 29% limitation (CJ )    Goal:  <20% limitation ( CI )    Short Term Goals (2 Weeks):  1. Subjective pain no more than 1-2/10 in right foot/heel with daily activity  2. Able to demonstrate improved ankle dorsiflexion by 2-3 degrees in right foot  3. Demonstrate independence with HEP  Long Term Goals (4 Weeks):   1. Able to return to daily activities without increased pain in right foot  2. Able to demonstrate ankle dorsiflexion/plantarflexion in right foot equal to left  3. LEFS score improved from 29% to <20% limitation.     Plan     Certification Period: 9/5/18 to 10/26/18  Recommended Treatment Plan: 2-3 times per week for 2-4 weeks: Manual Therapy, Neuromuscular  Re-ed, Patient Education and Therapeutic Exercise      Therapist: ALDAIR Veloz CERTIFY THE NEED FOR THESE SERVICES FURNISHED UNDER THIS PLAN OF TREATMENT AND WHILE UNDER MY CARE    Physician's comments: ________________________________________________________________________________________________________________________________________________      Physician's Name: ___________________________________

## 2018-09-06 ENCOUNTER — CLINICAL SUPPORT (OUTPATIENT)
Dept: REHABILITATION | Facility: HOSPITAL | Age: 68
End: 2018-09-06
Attending: PODIATRIST
Payer: MEDICARE

## 2018-09-06 DIAGNOSIS — R26.2 DIFFICULTY WALKING: Primary | ICD-10-CM

## 2018-09-06 DIAGNOSIS — M77.31 CALCANEAL SPUR OF FOOT, RIGHT: ICD-10-CM

## 2018-09-06 DIAGNOSIS — M76.61 ACHILLES TENDINITIS OF RIGHT LOWER EXTREMITY: ICD-10-CM

## 2018-09-06 PROCEDURE — 97110 THERAPEUTIC EXERCISES: CPT | Mod: PN

## 2018-09-06 PROCEDURE — 97140 MANUAL THERAPY 1/> REGIONS: CPT | Mod: PN

## 2018-09-06 RX ORDER — PRAVASTATIN SODIUM 40 MG/1
40 TABLET ORAL NIGHTLY
Qty: 90 TABLET | Refills: 1 | Status: SHIPPED | OUTPATIENT
Start: 2018-09-06 | End: 2019-08-29

## 2018-09-06 NOTE — PROGRESS NOTES
"                                                    Physical Therapy Daily Note     Name: Kathi [facility] Number: [id number]  Diagnosis:   Encounter Diagnoses   Name Primary?    Difficulty walking Yes    Achilles tendinitis of right lower extremity     Calcaneal spur of foot, right      Physician: Teofilo Plaza DPM  Precautions: standard  Visit #: 2 of 12  PTA Visit #: 0  Time In: 2:00 PM  Time Out: 3:10 PM    Subjective     Pt reports: "My foot has been pretty good"  Kathi reports that she has been up walking around the house and yard over the past 2 days and hasn't had any pain, just a little discomfort at times.   Pain Scale: Kathi rates pain on a scale of 0-10 to be 1 currently.    Objective     Kathi received individual therapeutic exercises to develop ROM for 30 minutes includin. Recumbent Bike x 20 mins at Level 6  2. Achilles Stretch on wedge 1 min x 3  __________________________________________ start below next visit  3. Heel raises - 2 counts up and 4 counts down  4. Vigor Gym - 3 way  5. Hip Abduction in Side lying  6. Bridgesright    Kathi received the following manual therapy techniques: Joint mobilizations and Soft tissue Mobilization were applied to the: right ankle/calf for 15 minutes including:  IASTM to achilles tendon at insertion on calcaneous and proximally up calf muscle; medial/lateral aspects of achilles on calcaneous.  A>P and P>A tibial/talus joint mobilization to increase DF/PF; calcaneal rocking    K-taping to calf - from plantar surface of foot at MT heads to mid calf - no stretch; 2 lift-off pieces to pull tissue away from achillies and bone spur - proximal and distal to bone spur.     The patient received the following direct contact modalities after being cleared for contraindications:     The patient received the following supervised modalities after being cleared for contradictions:     Written Home Exercises Provided: achilles stretching and heel raises  Pt demo " good understanding of the education provided. Kathi demonstrated good return demonstration of activities.     Education provided re:  Kathi verbalized good understanding of education provided.   No spiritual or educational barriers to learning provided    Assessment     Patient tolerated treatment very well;  Improved flexibility of achillies noted; no irritation at bone spur; decreased pain noted with taping. Kathi was wearing Powerstep sandles - excellent arch support with heel pads.     Problem List:  Decreased right ankle ROM  Difficulty walking due to pain  Achillies Tendonitis    This is a 67 y.o. female referred to outpatient physical therapy and presents with a medical diagnosis of achillies tendonitits as result of a bone spur and demonstrates limitations as described in the problem list. Pt prognosis is Excellent. Pt will continue to benefit from skilled outpatient physical therapy to address the deficits listed in the problem list, provide pt/family education and to maximize pt's level of independence in the home and community environment.     Goals as follows:  Short Term Goals (2 Weeks):  1. Subjective pain no more than 1-2/10 in right foot/heel with daily activity  2. Able to demonstrate improved ankle dorsiflexion by 2-3 degrees in right foot  3. Demonstrate independence with HEP  Long Term Goals (4 Weeks):   1. Able to return to daily activities without increased pain in right foot  2. Able to demonstrate ankle dorsiflexion/plantarflexion in right foot equal to left  3. LEFS score improved from 29% to <20% limitation.      Plan     Continue with established Plan of Care towards PT goals. Treatment 1-2x/week x 4 weeks    Therapist: Meena Ramirez, PT  9/6/2018

## 2018-09-06 NOTE — TELEPHONE ENCOUNTER
Patient requesting refill. Please advise. Thank you.        ----- Message from Pura Dalton sent at 9/6/2018 10:05 AM CDT -----  Contact: self  Needs refill on pravastatin (PRAVACHOL) 40 MG tablet takes 1 a day for a 90 day supply. Please call back at [phone] (home)       [facility] - [place], [place] - [address]  [address]  [facility] [address]  Phone: [phone] Fax: [phone]

## 2018-09-13 ENCOUNTER — INITIAL CONSULT (OUTPATIENT)
Dept: GASTROENTEROLOGY | Facility: CLINIC | Age: 68
End: 2018-09-13
Payer: MEDICARE

## 2018-09-13 VITALS
HEART RATE: 83 BPM | SYSTOLIC BLOOD PRESSURE: 108 MMHG | WEIGHT: 157.88 LBS | DIASTOLIC BLOOD PRESSURE: 63 MMHG | BODY MASS INDEX: 24.72 KG/M2

## 2018-09-13 DIAGNOSIS — R63.4 WEIGHT LOSS: ICD-10-CM

## 2018-09-13 DIAGNOSIS — R10.13 EPIGASTRIC PAIN: Primary | ICD-10-CM

## 2018-09-13 PROCEDURE — 99213 OFFICE O/P EST LOW 20 MIN: CPT | Mod: PBBFAC,PO | Performed by: INTERNAL MEDICINE

## 2018-09-13 PROCEDURE — 99205 OFFICE O/P NEW HI 60 MIN: CPT | Mod: S$PBB,,, | Performed by: INTERNAL MEDICINE

## 2018-09-13 PROCEDURE — 1101F PT FALLS ASSESS-DOCD LE1/YR: CPT | Mod: CPTII,,, | Performed by: INTERNAL MEDICINE

## 2018-09-13 PROCEDURE — 3078F DIAST BP <80 MM HG: CPT | Mod: CPTII,,, | Performed by: INTERNAL MEDICINE

## 2018-09-13 PROCEDURE — 3074F SYST BP LT 130 MM HG: CPT | Mod: CPTII,,, | Performed by: INTERNAL MEDICINE

## 2018-09-13 PROCEDURE — 99999 PR PBB SHADOW E&M-EST. PATIENT-LVL III: CPT | Mod: PBBFAC,,, | Performed by: INTERNAL MEDICINE

## 2018-09-13 NOTE — LETTER
September 13, 2018      Torie Hearn DO  [address]  [address]  [facility]  [place] [address]           [facility]  [address]  [place] [address]  Phone: [phone]          Patient: Kathi Head   MR Number: [id number]   YOB: 1950   Date of Visit: 9/13/2018       Dear Dr. Torie Hearn:    Thank you for referring Kathi Head to me for evaluation. Attached you will find relevant portions of my assessment and plan of care.    If you have questions, please do not hesitate to call me. I look forward to following Kathi Head along with you.    Sincerely,    Maci Sandoval MD    Enclosure  CC:  No Recipients    If you would like to receive this communication electronically, please contact [email or web address] or [phone] to request more information on [facility] Link access.    For providers and/or their staff who would like to refer a patient to [facility], please contact us through our one-stop-shop provider referral line, [facility], at [phone].    If you feel you have received this communication in error or would no longer like to receive these types of communications, please e-mail [email or web address]

## 2018-09-13 NOTE — H&P (VIEW-ONLY)
"[facility]     CC: Abdominal discomfort    HPI 67 y.o. female with history of GERD presents for evaluation of chronic, intermittent, mild to moderate abdominal discomfort associated with acid reflux and "stomach gurgling". She was previously evaluated and underwent EGD in [place], last in February 2018. She states there were no acute findings however she was placed on Protonix BID as well as Carafate QID, which she continues to take. These medications are effective at controlling her reflux however she continues to have abdominal discomfort. She notes she had a prior EGD in 2016, at that time found to have gastritis and was treated for H.pylori, however no follow up test to confirm eradication. She denies NSAID use. She has had a 20 lb weight loss, she believes due to altering her diet (avoids many foods that are associated with reflux). Her last colonoscopy was in 2012 with several polyps removed. She notes her stools have not changed in frequency but are now lighter. There is no family history of GI malignancy.       Past Medical History:   Diagnosis Date    Allergy     GERD (gastroesophageal reflux disease)     Hyperlipidemia        Past Surgical History:   Procedure Laterality Date    APPENDECTOMY      HYSTERECTOMY      THYROID SURGERY         Social History     Tobacco Use    Smoking status: Never Smoker    Smokeless tobacco: Never Used   Substance Use Topics    Alcohol use: No    Drug use: No       Family History   Problem Relation Age of Onset    Breast cancer Neg Hx     Eczema Neg Hx     Lupus Neg Hx     Psoriasis Neg Hx     Melanoma Neg Hx        Review of Systems  General ROS: negative for - chills, fever; +weight loss  Psychological ROS: negative for - hallucination, depression or suicidal ideation  Ophthalmic ROS: negative for - blurry vision, photophobia or eye pain  ENT ROS: negative for - epistaxis, sore throat or rhinorrhea  Respiratory ROS: no cough, shortness of " breath, or wheezing  Cardiovascular ROS: no chest pain or dyspnea on exertion  Gastrointestinal ROS: + abdominal discomfort, + reflux, no vomiting  Genito-Urinary ROS: no dysuria, trouble voiding, or hematuria  Musculoskeletal ROS: negative for - arthralgia, myalgia, weakness  Neurological ROS: no syncope or seizures; no ataxia  Dermatological ROS: negative for pruritis, rash and jaundice    Physical Examination  /63   Pulse 83   Wt 71.6 kg (157 lb 13.6 oz)   LMP  (LMP Unknown)   BMI 24.72 kg/m²   General appearance: alert, cooperative, no distress  HENT: Normocephalic, atraumatic, neck symmetrical, no nasal discharge   Eyes: conjunctivae/corneas clear, PERRL, EOM's intact, sclera anicteric  Lungs: clear to auscultation bilaterally, no dullness to percussion bilaterally, symmetric expansion, breathing unlabored  Heart: regular rate and rhythm without rub; no displacement of the PMI   Abdomen: soft, ttp in epigastrium without rebound or guarding, BS normoactive, no masses appreciated  Extremities: extremities symmetric; no clubbing, cyanosis, or edema  Integument: Skin color, texture, turgor normal; no rashes; hair distrubution normal, no jaundice  Neurologic: Alert and oriented X 3, no focal sensory or motor neurologic deficits  Psychiatric: no pressured speech; normal affect; no evidence of impaired cognition, no anxiety/depression     Labs:  2009- Hgb- 13.8      Assessment:   67 y.o. female with history of H.pylori 2016 presents for evaluation of abdominal pain, reflux, weight loss and change in color of stool. She has a recent unremarkable EGD.    Plan:  -Stop Carafate, continue BID Pantoprazole for now  -CBC, CMP, H.pylori stool Ag  -Schedule abdominal ultrasound  -Schedule colonoscopy  -If above unremarkable consider CT of abdomen as well as addition of H2 blocker at night    Maci Sandoval MD  [facility]  [address], [address]  [place] [address]  Office: [phone]  Fax: [phone]

## 2018-09-13 NOTE — PROGRESS NOTES
"[facility]     CC: Abdominal discomfort    HPI 67 y.o. female with history of GERD presents for evaluation of chronic, intermittent, mild to moderate abdominal discomfort associated with acid reflux and "stomach gurgling". She was previously evaluated and underwent EGD in [place], last in February 2018. She states there were no acute findings however she was placed on Protonix BID as well as Carafate QID, which she continues to take. These medications are effective at controlling her reflux however she continues to have abdominal discomfort. She notes she had a prior EGD in 2016, at that time found to have gastritis and was treated for H.pylori, however no follow up test to confirm eradication. She denies NSAID use. She has had a 20 lb weight loss, she believes due to altering her diet (avoids many foods that are associated with reflux). Her last colonoscopy was in 2012 with several polyps removed. She notes her stools have not changed in frequency but are now lighter. There is no family history of GI malignancy.       Past Medical History:   Diagnosis Date    Allergy     GERD (gastroesophageal reflux disease)     Hyperlipidemia        Past Surgical History:   Procedure Laterality Date    APPENDECTOMY      HYSTERECTOMY      THYROID SURGERY         Social History     Tobacco Use    Smoking status: Never Smoker    Smokeless tobacco: Never Used   Substance Use Topics    Alcohol use: No    Drug use: No       Family History   Problem Relation Age of Onset    Breast cancer Neg Hx     Eczema Neg Hx     Lupus Neg Hx     Psoriasis Neg Hx     Melanoma Neg Hx        Review of Systems  General ROS: negative for - chills, fever; +weight loss  Psychological ROS: negative for - hallucination, depression or suicidal ideation  Ophthalmic ROS: negative for - blurry vision, photophobia or eye pain  ENT ROS: negative for - epistaxis, sore throat or rhinorrhea  Respiratory ROS: no cough, shortness of " breath, or wheezing  Cardiovascular ROS: no chest pain or dyspnea on exertion  Gastrointestinal ROS: + abdominal discomfort, + reflux, no vomiting  Genito-Urinary ROS: no dysuria, trouble voiding, or hematuria  Musculoskeletal ROS: negative for - arthralgia, myalgia, weakness  Neurological ROS: no syncope or seizures; no ataxia  Dermatological ROS: negative for pruritis, rash and jaundice    Physical Examination  /63   Pulse 83   Wt 71.6 kg (157 lb 13.6 oz)   LMP  (LMP Unknown)   BMI 24.72 kg/m²   General appearance: alert, cooperative, no distress  HENT: Normocephalic, atraumatic, neck symmetrical, no nasal discharge   Eyes: conjunctivae/corneas clear, PERRL, EOM's intact, sclera anicteric  Lungs: clear to auscultation bilaterally, no dullness to percussion bilaterally, symmetric expansion, breathing unlabored  Heart: regular rate and rhythm without rub; no displacement of the PMI   Abdomen: soft, ttp in epigastrium without rebound or guarding, BS normoactive, no masses appreciated  Extremities: extremities symmetric; no clubbing, cyanosis, or edema  Integument: Skin color, texture, turgor normal; no rashes; hair distrubution normal, no jaundice  Neurologic: Alert and oriented X 3, no focal sensory or motor neurologic deficits  Psychiatric: no pressured speech; normal affect; no evidence of impaired cognition, no anxiety/depression     Labs:  2009- Hgb- 13.8      Assessment:   67 y.o. female with history of H.pylori 2016 presents for evaluation of abdominal pain, reflux, weight loss and change in color of stool. She has a recent unremarkable EGD.    Plan:  -Stop Carafate, continue BID Pantoprazole for now  -CBC, CMP, H.pylori stool Ag  -Schedule abdominal ultrasound  -Schedule colonoscopy  -If above unremarkable consider CT of abdomen as well as addition of H2 blocker at night    Maci Sandoval MD  [facility]  [address], [address]  [place] [address]  Office: [phone]  Fax: [phone]

## 2018-09-14 ENCOUNTER — HOSPITAL ENCOUNTER (OUTPATIENT)
Dept: RADIOLOGY | Facility: HOSPITAL | Age: 68
Discharge: HOME | End: 2018-09-14
Attending: INTERNAL MEDICINE
Payer: MEDICARE

## 2018-09-14 DIAGNOSIS — N13.39 OTHER HYDRONEPHROSIS: Primary | ICD-10-CM

## 2018-09-14 DIAGNOSIS — R63.4 WEIGHT LOSS: ICD-10-CM

## 2018-09-14 DIAGNOSIS — R10.13 EPIGASTRIC PAIN: ICD-10-CM

## 2018-09-14 PROCEDURE — 76705 ECHO EXAM OF ABDOMEN: CPT | Mod: TC

## 2018-09-14 PROCEDURE — 76705 ECHO EXAM OF ABDOMEN: CPT | Mod: 26,,, | Performed by: RADIOLOGY

## 2018-09-18 ENCOUNTER — HOSPITAL ENCOUNTER (OUTPATIENT)
Dept: RADIOLOGY | Facility: HOSPITAL | Age: 68
Discharge: HOME | End: 2018-09-18
Attending: INTERNAL MEDICINE
Payer: MEDICARE

## 2018-09-18 DIAGNOSIS — N13.39 OTHER HYDRONEPHROSIS: ICD-10-CM

## 2018-09-18 PROCEDURE — 25500020 PHARM REV CODE 255: Performed by: INTERNAL MEDICINE

## 2018-09-18 PROCEDURE — 74177 CT ABD & PELVIS W/CONTRAST: CPT | Mod: TC

## 2018-09-18 PROCEDURE — 74177 CT ABD & PELVIS W/CONTRAST: CPT | Mod: 26,,, | Performed by: RADIOLOGY

## 2018-09-18 RX ADMIN — IOHEXOL 75 ML: 350 INJECTION, SOLUTION INTRAVENOUS at 11:09

## 2018-09-21 DIAGNOSIS — N13.39 OTHER HYDRONEPHROSIS: Primary | ICD-10-CM

## 2018-09-24 ENCOUNTER — TELEPHONE (OUTPATIENT)
Dept: FAMILY MEDICINE | Facility: CLINIC | Age: 68
End: 2018-09-24

## 2018-09-24 DIAGNOSIS — E03.9 HYPOTHYROIDISM, UNSPECIFIED TYPE: ICD-10-CM

## 2018-09-24 DIAGNOSIS — E03.9 HYPOTHYROIDISM, UNSPECIFIED TYPE: Primary | ICD-10-CM

## 2018-09-24 RX ORDER — LEVOTHYROXINE SODIUM 125 UG/1
125 TABLET ORAL DAILY
Qty: 90 TABLET | Refills: 1 | Status: SHIPPED | OUTPATIENT
Start: 2018-09-24 | End: 2018-09-25 | Stop reason: SDUPTHER

## 2018-09-24 NOTE — TELEPHONE ENCOUNTER
Please advise.  Thank you,  Liz      ----- Message from Deanne Dave sent at 9/24/2018  2:44 PM CDT -----  Contact:   Patient want to know if you can change her levothyroxine rx to [facility], any questions please call back at [phone] (home)       [facility] - [place], [place] - [address]  [address] [address]  Phone: [phone] Fax: [phone]

## 2018-09-25 ENCOUNTER — TELEPHONE (OUTPATIENT)
Dept: FAMILY MEDICINE | Facility: CLINIC | Age: 68
End: 2018-09-25

## 2018-09-25 RX ORDER — LEVOTHYROXINE SODIUM 125 UG/1
125 TABLET ORAL DAILY
Qty: 90 TABLET | Refills: 1 | Status: SHIPPED | OUTPATIENT
Start: 2018-09-25 | End: 2018-12-27

## 2018-10-02 ENCOUNTER — ANESTHESIA (OUTPATIENT)
Dept: ENDOSCOPY | Facility: HOSPITAL | Age: 68
End: 2018-10-02
Payer: MEDICARE

## 2018-10-02 ENCOUNTER — ANESTHESIA EVENT (OUTPATIENT)
Dept: ENDOSCOPY | Facility: HOSPITAL | Age: 68
End: 2018-10-02
Payer: MEDICARE

## 2018-10-02 ENCOUNTER — HOSPITAL ENCOUNTER (OUTPATIENT)
Facility: HOSPITAL | Age: 68
Discharge: HOME | End: 2018-10-02
Attending: INTERNAL MEDICINE | Admitting: INTERNAL MEDICINE
Payer: MEDICARE

## 2018-10-02 DIAGNOSIS — R63.4 WEIGHT LOSS: ICD-10-CM

## 2018-10-02 PROCEDURE — D9220A PRA ANESTHESIA: Mod: CRNA,,, | Performed by: NURSE ANESTHETIST, CERTIFIED REGISTERED

## 2018-10-02 PROCEDURE — 25000003 PHARM REV CODE 250: Performed by: INTERNAL MEDICINE

## 2018-10-02 PROCEDURE — D9220A PRA ANESTHESIA: Mod: ANES,,, | Performed by: ANESTHESIOLOGY

## 2018-10-02 PROCEDURE — 37000009 HC ANESTHESIA EA ADD 15 MINS: Performed by: INTERNAL MEDICINE

## 2018-10-02 PROCEDURE — 37000008 HC ANESTHESIA 1ST 15 MINUTES: Performed by: INTERNAL MEDICINE

## 2018-10-02 PROCEDURE — 45378 DIAGNOSTIC COLONOSCOPY: CPT | Mod: ,,, | Performed by: INTERNAL MEDICINE

## 2018-10-02 PROCEDURE — 45378 DIAGNOSTIC COLONOSCOPY: CPT | Performed by: INTERNAL MEDICINE

## 2018-10-02 PROCEDURE — 63600175 PHARM REV CODE 636 W HCPCS: Performed by: NURSE ANESTHETIST, CERTIFIED REGISTERED

## 2018-10-02 RX ORDER — PROPOFOL 10 MG/ML
VIAL (ML) INTRAVENOUS
Status: DISCONTINUED | OUTPATIENT
Start: 2018-10-02 | End: 2018-10-02

## 2018-10-02 RX ORDER — SODIUM CHLORIDE 9 MG/ML
INJECTION, SOLUTION INTRAVENOUS CONTINUOUS
Status: DISCONTINUED | OUTPATIENT
Start: 2018-10-02 | End: 2018-10-02 | Stop reason: HOSPADM

## 2018-10-02 RX ORDER — SODIUM CHLORIDE 0.9 % (FLUSH) 0.9 %
3 SYRINGE (ML) INJECTION
Status: CANCELLED | OUTPATIENT
Start: 2018-10-02

## 2018-10-02 RX ORDER — LIDOCAINE HCL/PF 100 MG/5ML
SYRINGE (ML) INTRAVENOUS
Status: DISCONTINUED | OUTPATIENT
Start: 2018-10-02 | End: 2018-10-02

## 2018-10-02 RX ADMIN — PROPOFOL 50 MG: 10 INJECTION, EMULSION INTRAVENOUS at 08:10

## 2018-10-02 RX ADMIN — PROPOFOL 100 MG: 10 INJECTION, EMULSION INTRAVENOUS at 08:10

## 2018-10-02 RX ADMIN — LIDOCAINE HYDROCHLORIDE 50 MG: 20 INJECTION, SOLUTION INTRAVENOUS at 08:10

## 2018-10-02 RX ADMIN — SODIUM CHLORIDE: 0.9 INJECTION, SOLUTION INTRAVENOUS at 07:10

## 2018-10-02 NOTE — [employer] PATIENT INSTRUCTIONS
Discharge Summary/Instructions after an Endoscopic Procedure  Patient Name: Kathi Head  Patient MRN: [id number]  Patient YOB: 1950  Tuesday, October 02, 2018  Maci Sandoval MD  RESTRICTIONS:  During your procedure today, you received medications for sedation.  These   medications may affect your judgment, balance and coordination.  Therefore,   for 24 hours, you have the following restrictions:   - DO NOT drive a car, operate machinery, make legal/financial decisions,   sign important papers or drink alcohol.    ACTIVITY:  Today: no heavy lifting, straining or running due to procedural   sedation/anesthesia.  The following day: return to full activity including work.  DIET:  Eat and drink normally unless instructed otherwise.     TREATMENT FOR COMMON SIDE EFFECTS:  - Mild abdominal pain, nausea, belching, bloating or excessive gas:  rest,   eat lightly and use a heating pad.  - Sore Throat: treat with throat lozenges and/or gargle with warm salt   water.  - Because air was used during the procedure, expelling large amounts of air   from your rectum or belching is normal.  - If a bowel prep was taken, you may not have a bowel movement for 1-3 days.    This is normal.  SYMPTOMS TO WATCH FOR AND REPORT TO YOUR PHYSICIAN:  1. Abdominal pain or bloating, other than gas cramps.  2. Chest pain.  3. Back pain.  4. Signs of infection such as: chills or fever occurring within 24 hours   after the procedure.  5. Rectal bleeding, which would show as bright red, maroon, or black stools.   (A tablespoon of blood from the rectum is not serious, especially if   hemorrhoids are present.)  6. Vomiting.  7. Weakness or dizziness.  GO DIRECTLY TO THE NEAREST EMERGENCY ROOM IF YOU HAVE ANY OF THE FOLLOWING:      Difficulty breathing              Chills and/or fever over 101 F   Persistent vomiting and/or vomiting blood   Severe abdominal pain   Severe chest pain   Black, tarry stools   Bleeding- more than one  tablespoon   Any other symptom or condition that you feel may need urgent attention  Your doctor recommends these additional instructions:  If any biopsies were taken, your doctors clinic will contact you in 1 to 2   weeks with any results.  - Discharge patient to home (with spouse).   - Patient has a contact number available for emergencies.  The signs and   symptoms of potential delayed complications were discussed with the   patient.  Return to normal activities tomorrow.  Written discharge   instructions were provided to the patient.   - Resume previous diet.   - Continue present medications.   - Repeat colonoscopy in 10 years for screening purposes.   -High fiber diet/fiber supplement  + stool softener daily for hemorrhoidal   disease  - Return to my office in 6 months.  For questions, problems or results please call your physician - Maci Sandoval MD at Work:  [phone].  [facility], EMERGENCY ROOM PHONE NUMBER: [phone]  IF A COMPLICATION OR EMERGENCY SITUATION ARISES AND YOU ARE UNABLE TO REACH   YOUR PHYSICIAN - GO DIRECTLY TO THE EMERGENCY ROOM.  Maci Sandoval MD  10/2/2018 8:25:37 AM  This report has been verified and signed electronically.  [employer]

## 2018-10-02 NOTE — ANESTHESIA PREPROCEDURE EVALUATION
10/02/2018  Kathi Head is a 67 y.o., female.    Anesthesia Evaluation    I have reviewed the Patient Summary Reports.    I have reviewed the Nursing Notes.      Review of Systems  Anesthesia Hx:  No problems with previous Anesthesia    Hepatic/GI:   GERD, well controlled    Neurological:   Headaches    Endocrine:   Hypothyroidism        Physical Exam  General:  Well nourished    Airway/Jaw/Neck:  Airway Findings: Mouth Opening: Normal Tongue: Normal  Mallampati: II  TM Distance: Normal, at least 6 cm  Jaw/Neck Findings:  Neck ROM: Normal ROM     Eyes/Ears/Nose:  Eyes/Ears/Nose Findings:    Dental:  Dental Findings:   Chest/Lungs:  Chest/Lungs Findings: Normal Respiratory Rate     Heart/Vascular:  Heart Findings: Rate: Normal  Rhythm: Regular Rhythm        Mental Status:  Mental Status Findings:  Cooperative, Alert and Oriented         Anesthesia Plan  Type of Anesthesia, risks & benefits discussed:  Anesthesia Type:  general  Patient's Preference: General  Intra-op Monitoring Plan: standard ASA monitors  Intra-op Monitoring Plan Comments:   Post Op Pain Control Plan:   Post Op Pain Control Plan Comments:   Induction:   IV  Beta Blocker:  Patient is not currently on a Beta-Blocker (No further documentation required).       Informed Consent: Patient understands risks and agrees with Anesthesia plan.  Questions answered. Anesthesia consent signed with patient.  ASA Score: 2     Day of Surgery Review of History & Physical:    H&P update referred to the surgeon.         Ready For Surgery From Anesthesia Perspective.

## 2018-10-02 NOTE — ANESTHESIA POSTPROCEDURE EVALUATION
"Anesthesia Post Evaluation    Patient: Kathi Head    Procedure(s) Performed: Procedure(s) (LRB):  COLONOSCOPY (N/A)    Final Anesthesia Type: general  Patient location during evaluation: PACU  Patient participation: Yes- Able to Participate  Level of consciousness: awake and alert, oriented and awake  Post-procedure vital signs: reviewed and stable  Pain management: adequate  Airway patency: patent  PONV status at discharge: No PONV  Anesthetic complications: no      Cardiovascular status: blood pressure returned to baseline and hemodynamically stable  Respiratory status: unassisted, spontaneous ventilation and room air  Hydration status: euvolemic  Follow-up not needed.        Visit Vitals  /82   Pulse (!) 59   Temp 36.3 °C (97.3 °F) (Temporal)   Resp 18   Ht 5' 7" (1.702 m)   Wt 71.2 kg (157 lb)   LMP  (LMP Unknown)   SpO2 100%   Breastfeeding? No   BMI 24.59 kg/m²       Pain/Jerilyn Score: Pain Assessment Performed: Yes (10/2/2018  8:33 AM)  Presence of Pain: denies (10/2/2018  8:33 AM)  Pain Rating Prior to Med Admin: 1 (10/2/2018  8:33 AM)  Pain Rating Post Med Admin: 1 (10/2/2018  8:33 AM)  Jerilyn Score: 10 (10/2/2018  8:33 AM)        "

## 2018-10-02 NOTE — INTERVAL H&P NOTE
The patient has been examined and the H&P has been reviewed:    I concur with the findings and no changes have occurred since H&P was written.    Anesthesia/Surgery risks, benefits and alternative options discussed and understood by patient/family.          Active Hospital Problems    Diagnosis  POA    Weight loss [R63.4]  Yes      Resolved Hospital Problems   No resolved problems to display.

## 2018-10-02 NOTE — DISCHARGE INSTRUCTIONS
Hemorrhoids    Hemorrhoids are swollen and inflamed veins inside the rectum and near the anus. The rectum is the last several inches of the colon. The anus is the passage between the rectum and the outside of the body.  Causes  The veins can become swollen due to increased pressure in them. This is most often caused by:  · Chronic constipation or diarrhea  · Straining when having a bowel movement  · Sitting too long on the toilet  · A low-fiber diet  · Pregnancy  Symptoms  · Bleeding from the rectum (this may be noticeable after bowel movements)  · Lump near the anus  · Itching around the anus  · Pain around the anus  There are different types of hemorrhoids. Depending on the type you have and the severity, you may be able to treat yourself at home. In some cases, a procedure may be the best treatment option. Your healthcare provider can tell you more about this, if needed.  Home care  General care  · To get relief from pain or itching, try:  ¨ Topical products. Your healthcare provider may prescribe or recommend creams, ointments, or pads that can be applied to the hemorrhoid. Use these exactly as directed.  ¨ Medicines. Your healthcare provider may recommend stool softeners, suppositories, or laxatives to help manage constipation. Use these exactly as directed.  ¨ Sitz baths. A sitz bath involves sitting in a few inches of warm bath water. Be careful not to make the water so hot that you burn yourself--test it before sitting in it. Soak for about 10 to 15 minutes a few times a day. This may help relieve pain.  Tips to help prevent hemorrhoids  · Eat more fiber. Fiber adds bulk to stool and absorbs water as it moves through your colon. This makes stool softer and easier to pass.  ¨ Increase the fiber in your diet with more fiber-rich foods. These include fresh fruit, vegetables, and whole grains.  ¨ Take a fiber supplement or bulking agent, if advised to by your provider. These include products such as psyllium  or methylcellulose.  · Drink plenty of water, if directed to by your provider. This can help keep stool soft.  · Be more active. Frequent exercise aids digestion and helps prevent constipation. It may also help make bowel movements more regular.  · Dont strain during bowel movements. This can make hemorrhoids more likely. Also, dont sit on the toilet for long periods of time.  Follow-up care  Follow up with your healthcare provider, or as advised. If a culture or imaging tests were done, you will be notified of the results when they are ready. This may take a few days or longer.  When to seek medical advice  Call your healthcare provider right away if any of these occur:  · Increased bleeding from the rectum  · Increased pain around the rectum or anus  · Weakness or dizziness  Call 911  Call 911 or return to the emergency department right away if any of these occur:  · Trouble breathing or swallowing  · Fainting or loss of consciousness  · Unusually fast heart rate  · Vomiting blood  · Large amounts of blood in stool  Date Last Reviewed: 6/22/2015 © 2000-2017 [employer]. [address] [place] [address]. All rights reserved. This information is not intended as a substitute for professional medical care. Always follow your healthcare professional's instructions.        Colonoscopy     A camera attached to a flexible tube with a viewing lens is used to take video pictures.     Colonoscopy is a test to view the inside of your lower digestive tract (colon and rectum). Sometimes it can show the last part of the small intestine (ileum). During the test, small pieces of tissue may be removed for testing. This is called a biopsy. Small growths, such as polyps, may also be removed.   Why is colonoscopy done?  The test is done to help look for colon cancer. And it can help find the source of abdominal pain, bleeding, and changes in bowel habits. It may be needed once a year, depending on factors  such as your:  · Age  · Health history  · Family health history  · Symptoms  · Results from any prior colonoscopy  Risks and possible complications  These include:  · Bleeding               · A puncture or tear in the colon   · Risks of anesthesia  · A cancer lesion not being seen  Getting ready   To prepare for the test:  · Talk with your healthcare provider about the risks of the test (see below). Also ask your healthcare provider about alternatives to the test.  · Tell your healthcare provider about any medicines you take. Also tell him or her about any health conditions you may have.  · Make sure your rectum and colon are empty for the test. Follow the diet and bowel prep instructions exactly. If you dont, the test may need to be rescheduled.  · Plan for a friend or family member to drive you home after the test.     Colonoscopy provides an inside view of the entire colon.     You may discuss the results with your doctor right away or at a future visit.  During the test   The test is usually done in the hospital on an outpatient basis. This means you go home the same day. The procedure takes about 30 minutes. During that time:  · You are given relaxing (sedating) medicine through an IV line. You may be drowsy, or fully asleep.  · The healthcare provider will first give you a physical exam to check for anal and rectal problems.  · Then the anus is lubricated and the scope inserted.  · If you are awake, you may have a feeling similar to needing to have a bowel movement. You may also feel pressure as air is pumped into the colon. Its OK to pass gas during the procedure.  · Biopsy, polyp removal, or other treatments may be done during the test.  After the test   You may have gas right after the test. It can help to try to pass it to help prevent later bloating. Your healthcare provider may discuss the results with you right away. Or you may need to schedule a follow-up visit to talk about the results. After the  test, you can go back to your normal eating and other activities. You may be tired from the sedation and need to rest for a few hours.  Date Last Reviewed: 11/1/2016 © 2000-2017 "[employer]. [address] [place] [address], [place] [address]. All rights reserved. This information is not intended as a substitute for professional medical care. Always follow your healthcare professional's instructions.        Colonoscopy     A camera attached to a flexible tube with a viewing lens is used to take video pictures.     Colonoscopy is a test to view the inside of your lower digestive tract (colon and rectum). Sometimes it can show the last part of the small intestine (ileum). During the test, small pieces of tissue may be removed for testing. This is called a biopsy. Small growths, such as polyps, may also be removed.   Why is colonoscopy done?  The test is done to help look for colon cancer. And it can help find the source of abdominal pain, bleeding, and changes in bowel habits. It may be needed once a year, depending on factors such as your:  · Age  · Health history  · Family health history  · Symptoms  · Results from any prior colonoscopy  Risks and possible complications  These include:  · Bleeding               · A puncture or tear in the colon   · Risks of anesthesia  · A cancer lesion not being seen  Getting ready   To prepare for the test:  · Talk with your healthcare provider about the risks of the test (see below). Also ask your healthcare provider about alternatives to the test.  · Tell your healthcare provider about any medicines you take. Also tell him or her about any health conditions you may have.  · Make sure your rectum and colon are empty for the test. Follow the diet and bowel prep instructions exactly. If you dont, the test may need to be rescheduled.  · Plan for a friend or family member to drive you home after the test.     Colonoscopy provides an inside view of the entire colon.     You may  discuss the results with your doctor right away or at a future visit.  During the test   The test is usually done in the hospital on an outpatient basis. This means you go home the same day. The procedure takes about 30 minutes. During that time:  · You are given relaxing (sedating) medicine through an IV line. You may be drowsy, or fully asleep.  · The healthcare provider will first give you a physical exam to check for anal and rectal problems.  · Then the anus is lubricated and the scope inserted.  · If you are awake, you may have a feeling similar to needing to have a bowel movement. You may also feel pressure as air is pumped into the colon. Its OK to pass gas during the procedure.  · Biopsy, polyp removal, or other treatments may be done during the test.  After the test   You may have gas right after the test. It can help to try to pass it to help prevent later bloating. Your healthcare provider may discuss the results with you right away. Or you may need to schedule a follow-up visit to talk about the results. After the test, you can go back to your normal eating and other activities. You may be tired from the sedation and need to rest for a few hours.  Date Last Reviewed: 11/1/2016  © 2450-2457 [employer]. [address] [place] [address], [place] [address]. All rights reserved. This information is not intended as a substitute for professional medical care. Always follow your healthcare professional's instructions.      Discharge Instructions: Eating a High-Fiber Diet  Your health care provider has prescribed a high-fiber diet for you. Fiber is what gives strength and structure to plants. Most grains, beans, vegetables, and fruits contain fiber. Foods rich in fiber are often low in calories and fat, but they fill you up more. These foods may also reduce the risk of certain health problems.  There are two types of fiber:  · Insoluble fiber. This is found in whole grains, cereals, and certain fruits and  vegetables (such as apple skins, corn, and beans). Insoluble fiber is made up mainly of plant cell walls. It may prevent constipation and reduce the risk of certain types of cancer.  · Soluble fiber. This type of fiber is found in oats, beans, nuts, and certain fruits and vegetables (such as strawberries and peas). Soluble fiber turns to gel in the digestive system, slowing the movement of the digestive tract. It helps control blood sugar levels and can reduce cholesterol, which may help lower the risk of heart disease. Soluble fiber can also help control appetite.     Home care  · Know how much fiber you need a day. The recommended daily amount of fiber is 25 grams for women and 38 grams for men. After age 50, daily fiber needs drop to 21 grams for women and 30 grams for men.  · Ask your doctor about a fiber supplement. (Always take fiber supplements with a large glass of water.)  · Keep track of how much fiber you eat.  · Eat a variety of foods high in fiber.  · Learn to read and understand food labels.  · Ask your healthcare provider how much water you should be drinking.  · Look for these high-fiber foods:  ¨ Whole-grain breads and cereals  § 6 ounces a day give you about 18 grams of fiber (1 ounce is equal to 1 slice of bread, 1 cup of dry cereal, or 1/2 cup of cooked rice).  § Include wheat and oat bran cereals, whole-wheat muffins or toast, and corn tortillas in your meals.  ¨ Fruits   § 2 cups a day give you about 8 grams of fiber.  § Apples, oranges, strawberries, pears, and bananas are good sources.  § Fruit juice does not contain as much fiber as the fruit it was made from.  ¨ Vegetables  § 2½ cups a day give you about 11 grams of fiber. Add asparagus, carrots, broccoli, peas, and corn to your meals.  ¨ Legumes  § 1/4 cup a day (in place of meat) gives you about 4 grams of fiber. Try navy beans, lentils, chickpeas, and soybeans.  ¨ Seeds   § A small handful of seeds gives you about 3 grams of fiber. Try  sunflower seeds.  Follow-up  Make a follow-up appointment with a nutritionist as directed by our staff.  Date Last Reviewed: 6/1/2015  © 7620-9739 [employer]. [address] [place] [address], [place] [address]. All rights reserved. This information is not intended as a substitute for professional medical care. Always follow your healthcare professional's instructions.

## 2018-10-02 NOTE — TRANSFER OF CARE
"Anesthesia Transfer of Care Note    Patient: Kathi Head    Procedure(s) Performed: Procedure(s) (LRB):  COLONOSCOPY (N/A)    Patient location: GI    Anesthesia Type: general    Transport from OR: Transported from OR on 2-3 L/min O2 by NC with adequate spontaneous ventilation    Post pain: adequate analgesia    Post assessment: no apparent anesthetic complications    Post vital signs: stable    Level of consciousness: awake    Nausea/Vomiting: no nausea/vomiting    Complications: none    Transfer of care protocol was followed      Last vitals:   Visit Vitals  /72 (BP Location: Left arm, Patient Position: Lying)   Pulse 70   Temp 36.5 °C (97.7 °F) (Temporal)   Resp 18   Ht 5' 7" (1.702 m)   Wt 71.2 kg (157 lb)   LMP  (LMP Unknown)   SpO2 100%   Breastfeeding? No   BMI 24.59 kg/m²     "

## 2018-10-02 NOTE — OR NURSING
Have you had a colonoscopy LESS THAN 3 years ago?   * If YES, answer these questions*:   NO    1. Did patient have a prior colonic polyp in a previous surveillance/diagnostic colonoscopy and is 18 years or older on date of encounter?       NO  2. Documentation of < 3 year interval since the patients last colonoscopy due to medical reasons (eg., last colonoscopy incomplete, last colonoscopy had inadequate prep, piecemeal removal of adenomas, or last colonoscopy found > 10 adenomas) ?       Last colonoscopy 2010

## 2018-10-03 VITALS
DIASTOLIC BLOOD PRESSURE: 82 MMHG | WEIGHT: 157 LBS | OXYGEN SATURATION: 100 % | HEIGHT: 67 IN | RESPIRATION RATE: 18 BRPM | BODY MASS INDEX: 24.64 KG/M2 | HEART RATE: 59 BPM | TEMPERATURE: 97 F | SYSTOLIC BLOOD PRESSURE: 124 MMHG

## 2018-10-18 ENCOUNTER — OFFICE VISIT (OUTPATIENT)
Dept: PODIATRY | Facility: CLINIC | Age: 68
End: 2018-10-18
Payer: MEDICARE

## 2018-10-18 VITALS
SYSTOLIC BLOOD PRESSURE: 91 MMHG | HEART RATE: 88 BPM | BODY MASS INDEX: 25.11 KG/M2 | DIASTOLIC BLOOD PRESSURE: 60 MMHG | TEMPERATURE: 98 F | HEIGHT: 67 IN | WEIGHT: 160 LBS

## 2018-10-18 DIAGNOSIS — M76.61 ACHILLES TENDINITIS OF RIGHT LOWER EXTREMITY: Primary | ICD-10-CM

## 2018-10-18 DIAGNOSIS — M77.31 CALCANEAL SPUR OF FOOT, RIGHT: ICD-10-CM

## 2018-10-18 PROCEDURE — 99213 OFFICE O/P EST LOW 20 MIN: CPT | Mod: S$PBB,,, | Performed by: PODIATRIST

## 2018-10-18 PROCEDURE — 3078F DIAST BP <80 MM HG: CPT | Mod: CPTII,,, | Performed by: PODIATRIST

## 2018-10-18 PROCEDURE — 99213 OFFICE O/P EST LOW 20 MIN: CPT | Mod: PBBFAC,PN | Performed by: PODIATRIST

## 2018-10-18 PROCEDURE — 3074F SYST BP LT 130 MM HG: CPT | Mod: CPTII,,, | Performed by: PODIATRIST

## 2018-10-18 PROCEDURE — 99999 PR PBB SHADOW E&M-EST. PATIENT-LVL III: CPT | Mod: PBBFAC,,, | Performed by: PODIATRIST

## 2018-10-18 PROCEDURE — 1101F PT FALLS ASSESS-DOCD LE1/YR: CPT | Mod: CPTII,,, | Performed by: PODIATRIST

## 2018-10-19 ENCOUNTER — TELEPHONE (OUTPATIENT)
Dept: PODIATRY | Facility: CLINIC | Age: 68
End: 2018-10-19

## 2018-10-19 NOTE — TELEPHONE ENCOUNTER
----- Message from Castillo Christensen sent at 10/19/2018 11:36 AM CDT -----  Type: Needs Medical Advice    Who Called:  [facility]- [place]   Symptoms (please be specific):  NA   How long has patient had these symptoms:  NA  Pharmacy name and phone #:  [facility]   Best Call Back Number: [phone]  Additional Information: The office called to verify the doctor want patient to discontinue rehab

## 2018-10-21 NOTE — PROGRESS NOTES
Subjective:       Patient ID: Kathi Head is a 67 y.o. female.    Chief Complaint: Follow-up; Foot Pain; Foot Problem; and Heel Pain    Foot Pain      Patient presents for followup Achilles tendinitis and posterior heel spurring right    Review of Systems  Patient reports no fever, no night sweats, no significant weight gain, no significant weight loss, and no exercise intolerance. She reports no dry eyes, no irritation, and no vision change. She reports no difficulty hearing and no ear pain. She reports no frequent nosebleeds and no nose/sinus problems. She reports no sore throat, no bleeding gums, no snoring, no dry mouth, no mouth ulcers, no oral abnormalities, and no teeth problems. She reports no chest pain, no arm pain on exertion, no shortness of breath when walking, no shortness of breath when lying down, no palpitations, and no known heart murmur. She reports no cough, no wheezing, no shortness of breath, and no coughing up blood. She reports no abdominal pain, no vomiting, normal appetite, no diarrhea, and not vomiting blood. She reports no incontinence, no difficulty urinating, no hematuria, and no increased frequency. She reports no muscle aches, no muscle weakness, no arthralgias/joint pain, no back pain, and no swelling in the extremities. She reports no abnormal mole, no jaundice, and no rashes. She reports no loss of consciousness, no weakness, no numbness, no seizures, no dizziness, and no headaches. She reports no depression, no sleep disturbances, feeling safe in relationship, and no alcohol abuse. She reports no fatigue. She reports no swollen glands and no bruising. She reports no runny nose, no sinus pressure, no itching, no hives, and no frequent sneezing.     Objective:      Physical Exam  Constitutional: General Appearance: healthy-appearing, NAD, and normal body habitus.   Psychiatric: Orientation: oriented to time, place, and person. Mood and Affect: normal mood and affect and  active and alert.   Cardiovascular System: Arterial Pulses Right: posterior tibialis normal and dorsalis pedis normal. Arterial Pulses Left: posterior tibialis normal and dorsalis pedis normal. Edema Right: no edema. Edema Left: no edema. Varicosities Right: no varicosities and capillary refill test normal. Varicosities Left: no varicosities and capillary refill test normal.   Gait and Station: Appearance: normal gait, no limp, and ambulating with no assistive devices.   Ankles and Feet: Inspection Right: no erythema, induration, swelling, or warmth and normal alignment and deformity. Inspection Left: no erythema or induration and normal alignment. Bony Palpation of the Ankle/Foot Right: no tenderness of the sesamoids, the ankle, the calcaneal tuberosity, the metatarsals, the navicular tuberosity, the tarsometatarsal joints, the dome of talus, the head of talus, or the inferior tibiofibular joint and tenderness of the achilles tendon insertion. Bony Palpation of the Ankle/Foot Left: no tenderness of the sesamoids, the ankle, the calcaneal tuberosity, the metatarsals, the tarsometatarsal joints, the navicular tuberosity, the dome of talus, the head of talus, the inferior tibiofibular joint, or the achilles tendon insertion. Soft Tissue Palpation of the Ankle/Foot Right: no tenderness of the tibialis posterior, the tibialis anterior, the plantar fascia, the peroneus longus and brevis, the extensor hallucis longus, the sinus tarsi, the lateral anterior talofibular ligament, the anterior talofibular ligament, the calcaneofibular ligament, the posterior talofibular ligament, the peroneal retinaculum, the deltoid ligament, or the spring ligament and tenderness of the achilles tendon and the retrocalcaneal bursae. Soft Tissue Palpation of the Ankle/Foot Left: no tenderness of the tibialis posterior, the tibialis anterior, the plantar fascia, the achilles tendon, the peroneus longus and brevis, the extensor hallucis longus,  the sinus tarsi, the lateral anterior talofibular ligament, the anterior talofibular ligament, the calcaneofibular ligament, the posterior talofibular ligament, the peroneal retinaculum, the deltoid ligament, the spring ligament, or the retrocalcaneal bursae. Active Range of Motion Right: great toe flexion normal and extension normal and dorsiflexion normal, plantar flexion normal, inversion normal, and eversion normal. Active Range of Motion Left: great toe flexion normal and extension normal and dorsiflexion normal, plantar flexion normal, inversion normal, and eversion normal. Stability Right: anterior drawer negative and talar tilt negative. Stability Left: anterior drawer negative and talar tilt negative. Strength Right: extensor digitorum longus (5/5) and brevis (5/5); extensor hallucis longus (5/5); peroneus longus (5/5) and brevis (5/5); and posterior tibialis (5/5), tibialis anterior (5/5), and gastrocnemius (5/5). Strength Left: extensor digitorum longus (5/5) and brevis (5/5); extensor hallucis longus (5/5); peroneus longus (5/5) and brevis (5/5); and posterior tibialis (5/5), tibialis anterior (5/5), and gastrocnemius (5/5). Inspection of the Toes Right: no callus, claw toes, or hammer toes. Inspection of the Toes Left: no callus, claw toes, or hammer toes. Palpation and Stability of the Toes Right: no tenderness of the great toe, the second toe, the third toe, the fourth toe, or the fifth toe and anterior drawer negative. Palpation and Stability of the Toes Left: no tenderness of the great toe, the second toe, the third toe, the fourth toe, or the fifth toe and anterior drawer negative.   Neurological System: Coordination: heel-to-shin normal. Ankle Reflex Right: normal (2). Ankle Reflex Left: normal (2). Babinski Reflex Right: no plantar reflex or babinski reflex. Babinski Reflex Left: no plantar reflex or babinski reflex. Sensation on the Right: normal at the lateral plantar nerve, the medial plantar  nerve, the superficial peroneal nerve, the deep peroneal nerve, the sural nerve, and the saphenous nerve; normal distal extremities; and [place] 5.08. Sensation on the Left: normal at the lateral plantar nerve, the medial plantar nerve, the superficial peroneal nerve, the deep peroneal nerve, the sural nerve, and the saphenous nerve; normal distal extremities; and [place] 5.08. Special Tests on the Right: Lindsay's test negative, Hoffa's test negative, Tinel's test negative, external rotation test negative, and squeeze test negative. Special Tests on the Left: Lindsay's test negative, Hoffa's test negative, Tinel's test negative, external rotation test negative, and squeeze test negative.   Skin: Right Lower Extremity: normal. Left Lower Extremity: normal.   Following evaluation I advised the patient appears that the majority of her discomfort is at the Achilles tendon insertion not the plantar fascial insertion patient has minimal erythema and edema along the Achilles tendon and at the insertion of the Achilles tendon right foot.There is also palpable posterior calcaneal spurring noted in this region I am able to palpate the Achilles tendon so it does not show signs of being ruptured at this time.     Assessment:       1. Achilles tendinitis of right lower extremity    2. Calcaneal spur of foot, right        Plan:           Following evaluation patient has severe posterior calcaneal spurring it appears that the spur at the Achilles tendon insertion has started to fracture there is extensive spurring on plain film x-ray at the plantar posterior calcaneus as well as the midfoot right.  Patient is not able to take any anti-inflammatories she has a history of H pylori.  Following evaluation I did provide surgical consultation today I have recommended exhausting all conservative measures 1st after discussing this in detail with the patient she is in agreement with this.  Patient indicated today she  has been doing her physical therapy which she believes has helped to some degree however it is caused pain in her right shin area likely because of over stretching and doing stretching that the patient is not used to doing she had requested to discontinue physical therapy at this time she states she can do her own stretching exercises at home I have advised her this is certainly up to her as long as she is going to continue to do her exercises as directed that should help patient has shown signs of improvement regarding her Achilles tendinitis she has been warned the chronic Achilles tendonitis can lead to an Achilles tendon rupture she understands this I did provide surgical discussion today patient advised I would only recommend surgery if this is bothering her constantly and limiting her activities she is going to consider surgery but she wants to wait another month and see how she is doing.  Follow-up as needed total face-to-face time equaled 20 min.

## 2018-11-07 ENCOUNTER — OFFICE VISIT (OUTPATIENT)
Dept: UROLOGY | Facility: CLINIC | Age: 68
End: 2018-11-07
Payer: MEDICARE

## 2018-11-07 VITALS
DIASTOLIC BLOOD PRESSURE: 76 MMHG | BODY MASS INDEX: 24.33 KG/M2 | HEIGHT: 67 IN | HEART RATE: 100 BPM | SYSTOLIC BLOOD PRESSURE: 115 MMHG | TEMPERATURE: 98 F | WEIGHT: 155 LBS

## 2018-11-07 DIAGNOSIS — N13.39 OTHER HYDRONEPHROSIS: ICD-10-CM

## 2018-11-07 LAB
BILIRUB SERPL-MCNC: NEGATIVE MG/DL
BLOOD URINE, POC: NEGATIVE
COLOR, POC UA: NORMAL
GLUCOSE UR QL STRIP: NEGATIVE
KETONES UR QL STRIP: NEGATIVE
LEUKOCYTE ESTERASE URINE, POC: NEGATIVE
NITRITE, POC UA: NEGATIVE
PH, POC UA: 6
PROTEIN, POC: NEGATIVE
SPECIFIC GRAVITY, POC UA: 1010
UROBILINOGEN, POC UA: NEGATIVE

## 2018-11-07 PROCEDURE — 99204 OFFICE O/P NEW MOD 45 MIN: CPT | Mod: 25,S$GLB,, | Performed by: UROLOGY

## 2018-11-07 PROCEDURE — 3074F SYST BP LT 130 MM HG: CPT | Mod: CPTII,S$GLB,, | Performed by: UROLOGY

## 2018-11-07 PROCEDURE — 1101F PT FALLS ASSESS-DOCD LE1/YR: CPT | Mod: CPTII,S$GLB,, | Performed by: UROLOGY

## 2018-11-07 PROCEDURE — 3078F DIAST BP <80 MM HG: CPT | Mod: CPTII,S$GLB,, | Performed by: UROLOGY

## 2018-11-07 PROCEDURE — 81002 URINALYSIS NONAUTO W/O SCOPE: CPT | Mod: S$GLB,,, | Performed by: UROLOGY

## 2018-11-07 PROCEDURE — 99999 PR PBB SHADOW E&M-EST. PATIENT-LVL III: CPT | Mod: PBBFAC,,, | Performed by: UROLOGY

## 2018-11-07 NOTE — PROGRESS NOTES
Subjective:       Patient ID: Kathi Head is a 68 y.o. female.    Chief Complaint:   DATE OF VISIT:  11/07/2018    This is a consultation with Dr. Maci Sandoval.    CHIEF COMPLAINT:  Mild right-sided hydronephrosis.    Ms. Head is a 68-year-old female who has been evaluated for acid reflux  and abdominal discomfort.  She underwent a CT scan of the abdomen and  pelvis that shows some mild right hydronephrosis with no evidence of  obstruction, no stones.  There are no filling defects in the ureters or in  the renal pelvises.  The patient is free of any  symptoms.  The only  discomfort that she has is her chronic sinus infection and acid reflux.   The patient denies any previous genitourinary history.    The past medical and surgical history is well documented in the medical  record, including medications and allergies and all this was reviewed by me  during this visit.    The urinalysis today is within normal limits.  After a lengthy discussion  regarding her condition, I suggested that we proceed with a urine cytology  now and that we can do a cystoscopy, retrograde pyelograms to elucidate the  problem if there is any or another option will be to wait three more months  and repeat the CT scan to see if the hydronephrosis still persists, worse  or the same.  After discussing the pros and cons of each approach, the  patient has elected to wait three months and repeat the CT scan and a  cytology today.        EOR/HN dd: 11/07/2018 16:44:45 (CST)   td: 11/07/2018 19:58:02 (CST)  Doc ID #[id number]   Job ID #[id number]    CC: Maci Sandoval M.D.           [facility]  Review of Systems   Constitutional: Negative.  Negative for activity change.   HENT: Negative.  Negative for facial swelling.    Eyes: Negative for discharge.   Respiratory: Negative for cough and shortness of breath.    Cardiovascular: Negative for chest pain and palpitations.   Gastrointestinal: Positive for abdominal pain. Negative for abdominal  distention, blood in stool and constipation.        Acid reflux   Genitourinary: Negative for dysuria, flank pain, frequency, hematuria, urgency and vaginal pain.   Musculoskeletal: Negative.  Negative for arthralgias.   Skin: Negative.    Neurological: Negative.  Negative for dizziness.   Hematological: Negative for adenopathy.   Psychiatric/Behavioral: The patient is not nervous/anxious.        Objective:      Physical Exam   Constitutional: She appears well-developed.   HENT:   Head: Normocephalic.   Eyes: Pupils are equal, round, and reactive to light.   Neck: Normal range of motion.   Cardiovascular: Normal rate.    Pulmonary/Chest: Effort normal.   Abdominal: Soft. She exhibits no distension and no mass. There is no tenderness.   Genitourinary: Vagina normal.   Musculoskeletal: Normal range of motion.   Neurological: She is alert.   Skin: Skin is warm.     Psychiatric: She has a normal mood and affect.       Assessment:       1. Other hydronephrosis        Plan:       Other hydronephrosis  -     POCT URINE DIPSTICK WITHOUT MICROSCOPE    Cytology today. Repeat CT scan in 3 mo. RTC in 3 mo.

## 2018-11-07 NOTE — Clinical Note
November 7, 2018      Maci Sandoval MD  [address]  [address]  [place] [place] [address]           [facility] - Urology  [address] [place] [address]  Phone: [phone]  Fax: [phone]          Patient: Kathi Head   MR Number: [id number]   YOB: 1950   Date of Visit: 11/7/2018       Dear Dr. Maci Sandoval:    Thank you for referring Kathi Head to me for evaluation. Attached you will find relevant portions of my assessment and plan of care.    If you have questions, please do not hesitate to call me. I look forward to following Kathi Head along with you.    Sincerely,    Reyes Scott MD    Enclosure  CC:  No Recipients    If you would like to receive this communication electronically, please contact [email or web address] or [phone] to request more information on [employer] access.    For providers and/or their staff who would like to refer a patient to [facility], please contact us through our one-stop-shop provider referral line, [facility], at [phone].    If you feel you have received this communication in error or would no longer like to receive these types of communications, please e-mail [email or web address]

## 2018-11-16 ENCOUNTER — TELEPHONE (OUTPATIENT)
Dept: FAMILY MEDICINE | Facility: CLINIC | Age: 68
End: 2018-11-16

## 2018-11-16 NOTE — TELEPHONE ENCOUNTER
Pt present to clinic today to request orders for lab work she will have done at [facility].

## 2018-11-16 NOTE — TELEPHONE ENCOUNTER
----- Message from Henny Guerrero sent at 11/16/2018  8:33 AM CST -----  Contact: patient  Type: Needs Medical Advice    Who Called:  Patient  Symptoms (please be specific):    How long has patient had these symptoms:    Pharmacy name and phone #:    Best Call Back Number: [phone]  Additional Information: patient called to advise that she lost order for thyroid labs,stated that she will come to office to  new order

## 2018-11-19 ENCOUNTER — OFFICE VISIT (OUTPATIENT)
Dept: FAMILY MEDICINE | Facility: CLINIC | Age: 68
End: 2018-11-19
Payer: MEDICARE

## 2018-11-19 ENCOUNTER — TELEPHONE (OUTPATIENT)
Dept: FAMILY MEDICINE | Facility: CLINIC | Age: 68
End: 2018-11-19

## 2018-11-19 VITALS
OXYGEN SATURATION: 98 % | TEMPERATURE: 99 F | SYSTOLIC BLOOD PRESSURE: 105 MMHG | HEART RATE: 92 BPM | DIASTOLIC BLOOD PRESSURE: 65 MMHG | WEIGHT: 156 LBS | BODY MASS INDEX: 24.48 KG/M2 | HEIGHT: 67 IN

## 2018-11-19 DIAGNOSIS — R05.9 COUGH: ICD-10-CM

## 2018-11-19 DIAGNOSIS — R51.9 NONINTRACTABLE EPISODIC HEADACHE, UNSPECIFIED HEADACHE TYPE: ICD-10-CM

## 2018-11-19 DIAGNOSIS — K21.00 GASTROESOPHAGEAL REFLUX DISEASE WITH ESOPHAGITIS: Primary | Chronic | ICD-10-CM

## 2018-11-19 PROCEDURE — 3078F DIAST BP <80 MM HG: CPT | Mod: CPTII,S$GLB,, | Performed by: FAMILY MEDICINE

## 2018-11-19 PROCEDURE — 1101F PT FALLS ASSESS-DOCD LE1/YR: CPT | Mod: CPTII,S$GLB,, | Performed by: FAMILY MEDICINE

## 2018-11-19 PROCEDURE — 99214 OFFICE O/P EST MOD 30 MIN: CPT | Mod: S$GLB,,, | Performed by: FAMILY MEDICINE

## 2018-11-19 PROCEDURE — 3074F SYST BP LT 130 MM HG: CPT | Mod: CPTII,S$GLB,, | Performed by: FAMILY MEDICINE

## 2018-11-19 NOTE — TELEPHONE ENCOUNTER
+Scheduled pt for today @ 4pm with DINESH CRUZ.      ----- Message from Shayy Richard sent at 11/19/2018 11:29 AM CST -----  Contact: patient  Type:  Same Day Appointment Request    Caller is requesting a same day appointment.  Caller declined first available appointment listed below.      Name of Caller: patient  When is the first available appointment?  11/27/2018  Symptoms:  Not feeling well, cannot sleep, awaiting thyroid results from lab work done Friday  Best Call Back Number:  [phone]  Additional Information:   Patient really would like to be seen today.  Thank you

## 2018-11-19 NOTE — PROGRESS NOTES
Subjective:       Patient ID: Kathi Head is a 68 y.o. female.    Chief Complaint: Sinus Problem    HPI   Ms. Head presents for follow-up. Her acid reflux is stable, but she wants to come off sucralfate and protonix, if possible. Eats a bland diet.     Complains of increased mucus production, getting up several times a night to cough it up. No relief with mucinex D. Started approx 3-4 months ago. Was told by a nurse that it could have to do with her thyroid. Denies any nasal or sinus congestion, no difficulty swallowing.     Lastly, complains of intermittent sharp pains in either temple. Started ~2 months ago, happening several times a week. Typically only lasts a few seconds, but she takes tylenol just in case to keep it from coming back.    Review of Systems   Constitutional: Negative for chills, fatigue, fever and unexpected weight change.   HENT: Positive for congestion. Negative for postnasal drip, sore throat and trouble swallowing.    Respiratory: Negative for cough, chest tightness, shortness of breath and wheezing.    Cardiovascular: Negative for chest pain, palpitations and leg swelling.   Gastrointestinal: Negative for abdominal pain, constipation, diarrhea, nausea and vomiting.   Neurological: Positive for headaches. Negative for dizziness, tremors and seizures.       Past Medical History:   Diagnosis Date    Allergy     GERD (gastroesophageal reflux disease)     Hyperlipidemia     Thyroid disease      Past Surgical History:   Procedure Laterality Date    APPENDECTOMY      COLONOSCOPY N/A 10/2/2018    Procedure: COLONOSCOPY;  Surgeon: Maci Sandoval MD;  Location: [facility];  Service: Endoscopy;  Laterality: N/A;    COLONOSCOPY N/A 10/2/2018    Performed by Maci Sandoval MD at [facility]    HYSTERECTOMY      THYROID SURGERY       Social History     Socioeconomic History    Marital status:      Spouse name: Not on file    Number of children: Not on file    Years of  "education: Not on file    Highest education level: Not on file   Social Needs    Financial resource strain: Not on file    Food insecurity - worry: Not on file    Food insecurity - inability: Not on file    Transportation needs - medical: Not on file    Transportation needs - non-medical: Not on file   Occupational History    Not on file   Tobacco Use    Smoking status: Never Smoker    Smokeless tobacco: Never Used   Substance and Sexual Activity    Alcohol use: No    Drug use: No    Sexual activity: Yes     Partners: Male     Birth control/protection: Post-menopausal   Other Topics Concern    Are you pregnant or think you may be? Not Asked    Breast-feeding Not Asked   Social History Narrative    Not on file     Family History   Problem Relation Age of Onset    Breast cancer Neg Hx     Eczema Neg Hx     Lupus Neg Hx     Psoriasis Neg Hx     Melanoma Neg Hx        Objective:      /65 (BP Location: Right arm, Patient Position: Sitting, BP Method: Medium (Automatic))   Pulse 92   Temp 99.3 °F (37.4 °C) (Tympanic)   Ht 5' 7" (1.702 m)   Wt 70.8 kg (156 lb)   LMP  (LMP Unknown)   SpO2 98%   BMI 24.43 kg/m²   Physical Exam   Constitutional: She is oriented to person, place, and time. She appears well-developed and well-nourished. No distress.   Cardiovascular: Normal rate, regular rhythm and normal heart sounds.   No murmur heard.  Pulmonary/Chest: Effort normal and breath sounds normal. No stridor. No respiratory distress.   Neurological: She is alert and oriented to person, place, and time. No cranial nerve deficit. She exhibits normal muscle tone. Coordination normal.   Skin: She is not diaphoretic.   Psychiatric: She has a normal mood and affect. Her behavior is normal. Judgment and thought content normal.   Vitals reviewed.      Assessment:       1. Gastroesophageal reflux disease with esophagitis    2. Cough    3. Nonintractable episodic headache, unspecified headache type      "   Plan:       Gastroesophageal reflux disease with esophagitis  - stable - patient to stop carafate, take protonix every other day    Cough  - start otc antihistamine; call or rtc for worsening symptoms    Nonintractable episodic headache, unspecified headache type  -     Sedimentation rate; Future; Expected date: 11/19/2018  -     Continue tylenol prn            Risks, benefits, and side effects were discussed with the patient. All questions were answered to the fullest satisfaction of the patient, and pt verbalized understanding and agreement to treatment plan. Pt was to call with any new or worsening symptoms, or present to the ER.

## 2018-12-07 ENCOUNTER — OUTPATIENT CASE MANAGEMENT (OUTPATIENT)
Dept: ADMINISTRATIVE | Facility: OTHER | Age: 68
End: 2018-12-07

## 2018-12-07 NOTE — PROGRESS NOTES
Thank you for the referral.  Patient has been assigned to Gunjan Jones LMSW for low risk screening for Outpatient Case Management.     Reason for referral:  I received a call from the below member with multiple questions/needs. Member is struggling with side effects she is attributing to Gabapentin. Member reports having had shingles and started on Gabapentin January of 2018 and continues to be on same. Member reports feeling like her stomach is moving around, member states that feeling is now going up to her chest and she is struggling with eye lid twitching as well. Member questioned stopping medications and was educated on contact with PCP DALIA JAMES about any medication changes. Member concerned as there has been no solution/diagnosis to relate to her health concerns/symptoms. Member could benefit from CM Assistance in care coordination and education related to medications. [facility] # confirmed [phone].   JANINE PALUMBO   [id number]   1950      Thank-you,   Breanna Gerhardt, RN, [facility], [facility]  Care Manager, Telephonic Nurse 2  Health Planning & Support / Clinical Operations      [place], [place]  T - [phone]  [email or web address]     Please contact [facility] at ext. [phone] with any questions.    Thank you,    Amina Bach, [facility]  Outpatient Care Mgmt.  [phone]

## 2018-12-11 ENCOUNTER — OUTPATIENT CASE MANAGEMENT (OUTPATIENT)
Dept: ADMINISTRATIVE | Facility: OTHER | Age: 68
End: 2018-12-11

## 2018-12-11 ENCOUNTER — TELEPHONE (OUTPATIENT)
Dept: FAMILY MEDICINE | Facility: CLINIC | Age: 68
End: 2018-12-11

## 2018-12-11 NOTE — TELEPHONE ENCOUNTER
Spoke with pt, pt request lab results.  Please advise.      ----- Message from Gerard Odell sent at 12/11/2018 12:55 PM CST -----  Type: Needs Medical Advice    Who Called:  pt  Best Call Back Number: [phone]  Additional Information: pt had blood work on her thyroid and would like to discuss her results. Also pt wants to know if the on call nurse left a message regarding the symptoms/concerns she was dealing with. Pt did not disclose the symptoms/concerns with me. Please call patient to advise.

## 2018-12-11 NOTE — PROGRESS NOTES
LMSW received referral from [place] indicating patient could benefit from CM assistance in care coordination and education related to medication. Message forwarded to patient's PCP requesting they address patient's concerns. No social needs identified. Case closed.

## 2018-12-11 NOTE — TELEPHONE ENCOUNTER
Spoke with pt and discussed TSH from September (that was the lab she was referring to).  I reminded her that we discussed them in length and had a medication change based on the results.  Told pt that repeat labs are due the last week of January and she requested that we print the order for her to p/u and take to an outside lab.  Printed same.  Pt states that she thinks the Gabapentin is causing her to shake and be nervous.  I instructed her to remain on medication until we call her back with instructions.  Pt verbalized an understanding.  Please advise.  Thank you.

## 2018-12-11 NOTE — TELEPHONE ENCOUNTER
Please let her know that I don't have the results of her thyroid labs. Also, I don't see any phone message about concerning symptoms, but if she signs up for the portal, she can message me directly. Thanks

## 2018-12-12 NOTE — TELEPHONE ENCOUNTER
She can try taking gabapentin every other day to see if her symptoms improve, and if they do, she can stop taking it and we can discuss a replacement when she sees me in January. Thanks!

## 2018-12-13 ENCOUNTER — OFFICE VISIT (OUTPATIENT)
Dept: PODIATRY | Facility: CLINIC | Age: 68
End: 2018-12-13
Payer: MEDICARE

## 2018-12-13 VITALS
WEIGHT: 156 LBS | SYSTOLIC BLOOD PRESSURE: 125 MMHG | DIASTOLIC BLOOD PRESSURE: 70 MMHG | TEMPERATURE: 97 F | HEIGHT: 67 IN | BODY MASS INDEX: 24.48 KG/M2 | HEART RATE: 75 BPM

## 2018-12-13 DIAGNOSIS — M77.31 CALCANEAL SPUR OF FOOT, RIGHT: ICD-10-CM

## 2018-12-13 DIAGNOSIS — M76.61 ACHILLES TENDINITIS OF RIGHT LOWER EXTREMITY: Primary | ICD-10-CM

## 2018-12-13 PROCEDURE — 1101F PT FALLS ASSESS-DOCD LE1/YR: CPT | Mod: CPTII,S$GLB,, | Performed by: PODIATRIST

## 2018-12-13 PROCEDURE — 3078F DIAST BP <80 MM HG: CPT | Mod: CPTII,S$GLB,, | Performed by: PODIATRIST

## 2018-12-13 PROCEDURE — 99999 PR PBB SHADOW E&M-EST. PATIENT-LVL IV: CPT | Mod: PBBFAC,,, | Performed by: PODIATRIST

## 2018-12-13 PROCEDURE — 99214 OFFICE O/P EST MOD 30 MIN: CPT | Mod: S$GLB,,, | Performed by: PODIATRIST

## 2018-12-13 PROCEDURE — 3074F SYST BP LT 130 MM HG: CPT | Mod: CPTII,S$GLB,, | Performed by: PODIATRIST

## 2018-12-17 NOTE — PROGRESS NOTES
Subjective:       Patient ID: Kathi Head is a 68 y.o. female.    Chief Complaint: Follow-up; Heel Pain; and Foot Problem    Foot Pain   Associated symptoms include arthralgias and joint swelling.    Patient presents for followup Achilles tendinitis and posterior heel spurring right    Review of Systems   Musculoskeletal: Positive for arthralgias, gait problem and joint swelling.   All other systems reviewed and are negative.      Objective:      Physical Exam   Constitutional: She appears well-developed and well-nourished.   Cardiovascular: Intact distal pulses.   Pulses:       Dorsalis pedis pulses are 2+ on the right side, and 2+ on the left side.        Posterior tibial pulses are 1+ on the right side, and 1+ on the left side.   Pulmonary/Chest: Effort normal.   Musculoskeletal: She exhibits edema, tenderness and deformity.        Right foot: There is decreased range of motion and deformity.        Feet:    Feet:   Right Foot:   Protective Sensation: 4 sites tested. 4 sites sensed.   Skin Integrity: Positive for erythema and warmth.   Left Foot:   Protective Sensation: 4 sites tested. 4 sites sensed.   Neurological: She is alert.   Skin: Skin is warm. Capillary refill takes less than 2 seconds. There is erythema.   Psychiatric: She has a normal mood and affect. Her behavior is normal. Judgment and thought content normal.   Nursing note and vitals reviewed.       Following evaluation I advised the patient appears that the majority of her discomfort is at the Achilles tendon insertion not the plantar fascial insertion patient has minimal erythema and edema along the Achilles tendon and at the insertion of the Achilles tendon right foot.There is also palpable posterior calcaneal spurring noted in this region I am able to palpate the Achilles tendon so it does not show signs of being ruptured at this time.   Patient has considerable discomfort upon palpation of the Achilles tendon insertion with palpable  bony exostosis noted at the posterior aspect of the patient's right heel this elicits significant discomfort on examination.    Assessment:       1. Achilles tendinitis of right lower extremity    2. Calcaneal spur of foot, right        Plan:           Following evaluation patient has severe posterior calcaneal spurring it appears that the spur at the Achilles tendon insertion has started to fracture there is extensive spurring on plain film x-ray at the plantar posterior calcaneus as well as the midfoot right.  Patient is not able to take any anti-inflammatories she has a history of H pylori.   I have previously discussed surgical intervention with the patient and told the patient we need to exhaust all conservative measures 1st patient indicated conservative measures have had limited success in reducing her discomfort she states that she can walk for about 20-30 minutes before having severe pain at the back of her right heel having to stop and rest patient states that this time she would like to pursue surgical intervention to remove the posterior calcaneal spurs repair and reattached the Achilles tendon right.  Patient advised at this point it is not necessary to do an MRI she is going to need to have this area repaired whether there is a partial tear or not we will be prepared for this at the time of surgery.  I have stay tentatively schedule surgery for February 1st patient will be seen prior to this time for additional surgical consultation she has been advised to contact us with any problems questions or concerns prior to this time.  Total face-to-face time including discussion evaluation surgical consultation and discussion of treatment plan equaled 30 min.  X-rays reviewed in detail.

## 2018-12-18 ENCOUNTER — TELEPHONE (OUTPATIENT)
Dept: FAMILY MEDICINE | Facility: CLINIC | Age: 68
End: 2018-12-18

## 2018-12-18 NOTE — TELEPHONE ENCOUNTER
Pt scheduled to see NP.      ----- Message from Jojo Albright sent at 12/18/2018 12:42 PM CST -----  Contact: self  Patient [phone] is calling as she never received the results of her blood work that she dropped off recently/she has thyroid problems and is not feeling well/please call patient

## 2018-12-24 ENCOUNTER — OFFICE VISIT (OUTPATIENT)
Dept: FAMILY MEDICINE | Facility: CLINIC | Age: 68
End: 2018-12-24
Payer: MEDICARE

## 2018-12-24 ENCOUNTER — TELEPHONE (OUTPATIENT)
Dept: FAMILY MEDICINE | Facility: CLINIC | Age: 68
End: 2018-12-24

## 2018-12-24 VITALS
WEIGHT: 160 LBS | BODY MASS INDEX: 25.11 KG/M2 | OXYGEN SATURATION: 99 % | HEART RATE: 80 BPM | HEIGHT: 67 IN | TEMPERATURE: 98 F | RESPIRATION RATE: 18 BRPM | DIASTOLIC BLOOD PRESSURE: 78 MMHG | SYSTOLIC BLOOD PRESSURE: 115 MMHG

## 2018-12-24 DIAGNOSIS — E03.9 HYPOTHYROIDISM (ACQUIRED): ICD-10-CM

## 2018-12-24 DIAGNOSIS — K21.00 GASTROESOPHAGEAL REFLUX DISEASE WITH ESOPHAGITIS: Primary | ICD-10-CM

## 2018-12-24 PROCEDURE — 3074F SYST BP LT 130 MM HG: CPT | Mod: CPTII,S$GLB,, | Performed by: NURSE PRACTITIONER

## 2018-12-24 PROCEDURE — 99213 OFFICE O/P EST LOW 20 MIN: CPT | Mod: S$GLB,,, | Performed by: NURSE PRACTITIONER

## 2018-12-24 PROCEDURE — 1101F PT FALLS ASSESS-DOCD LE1/YR: CPT | Mod: CPTII,S$GLB,, | Performed by: NURSE PRACTITIONER

## 2018-12-24 PROCEDURE — 3078F DIAST BP <80 MM HG: CPT | Mod: CPTII,S$GLB,, | Performed by: NURSE PRACTITIONER

## 2018-12-24 RX ORDER — DEXLANSOPRAZOLE 60 MG/1
60 CAPSULE, DELAYED RELEASE ORAL DAILY
Qty: 90 CAPSULE | Refills: 1 | Status: SHIPPED | OUTPATIENT
Start: 2018-12-24 | End: 2019-08-29

## 2018-12-24 RX ORDER — DEXLANSOPRAZOLE 60 MG/1
60 CAPSULE, DELAYED RELEASE ORAL DAILY
Qty: 30 CAPSULE | Refills: 11 | Status: SHIPPED | OUTPATIENT
Start: 2018-12-24 | End: 2018-12-24 | Stop reason: SDUPTHER

## 2018-12-24 NOTE — PROGRESS NOTES
Subjective:       Patient ID: Kathi Head is a 68 y.o. female.    Chief Complaint: Medication Problem and Thyroid Problem (aches and pain with thyroid )    67 y/o presents to discuss her synthroid dosing however her labs were done at [facility] and the facility is closed for gael. We discussed calling on Thursday when they open to obtain results. She is needing her synthroid refilled but recommended waiting until we see the results.  She also c/o her stomach gurgling. She reports it was better when she was taking Carafate for a year. She was recently taken off of the Carafate. She underwent EGD resulted as acid reflux. She describes the sensation worse when she lies down. We discussed a GI referral if dexilant does not help.      Review of Systems   Constitutional: Negative for chills, fever and unexpected weight change.   HENT: Negative for dental problem and trouble swallowing.    Eyes: Negative for visual disturbance.   Respiratory: Negative for shortness of breath and wheezing.    Cardiovascular: Negative for chest pain.   Gastrointestinal: Negative for abdominal pain, nausea and vomiting.   Endocrine: Negative for polydipsia and polyuria.   Genitourinary: Negative for dysuria.   Musculoskeletal: Negative for joint swelling.   Skin: Negative for rash.   Neurological: Negative for syncope and headaches.   Psychiatric/Behavioral: Negative for agitation and confusion.       Objective:      Physical Exam   Constitutional: She is oriented to person, place, and time. She appears well-developed and well-nourished.   HENT:   Head: Normocephalic.   Eyes: Pupils are equal, round, and reactive to light.   Neck: Normal range of motion. Neck supple.   Cardiovascular: Normal rate, regular rhythm and normal heart sounds.   Pulmonary/Chest: Effort normal and breath sounds normal.   Abdominal: Soft. Bowel sounds are normal.   Musculoskeletal: Normal range of motion.   Neurological: She is alert and oriented to person,  place, and time.   Skin: Skin is warm and dry. Capillary refill takes less than 2 seconds.   Psychiatric: She has a normal mood and affect. Judgment and thought content normal.   Vitals reviewed.      Assessment:       1. Gastroesophageal reflux disease with esophagitis    2. Hypothyroidism (acquired)        Plan:       1- obtain labs from [facility]  2- prescribe synthroid after labs are reviewed  3- start dexilant.   4- If dexilant ineffective, will refer to GI MD for chronic GERD and bubbly stomach

## 2018-12-26 NOTE — TELEPHONE ENCOUNTER
Lab results requested from [facility].  Awaiting results.    Spoke with Herb at [facility], Herb does not show any labs completed in 2018.      Spoke with pt, pt reports she left lab results with office.  Pending receipt.        Spoke with representative at [facility], not results are avaiable.

## 2018-12-27 ENCOUNTER — TELEPHONE (OUTPATIENT)
Dept: FAMILY MEDICINE | Facility: CLINIC | Age: 68
End: 2018-12-27

## 2018-12-27 RX ORDER — RABEPRAZOLE SODIUM 20 MG/1
20 TABLET, DELAYED RELEASE ORAL DAILY
Qty: 30 TABLET | Refills: 11 | Status: SHIPPED | OUTPATIENT
Start: 2018-12-27 | End: 2019-08-29

## 2018-12-27 RX ORDER — LEVOTHYROXINE SODIUM 100 UG/1
100 TABLET ORAL DAILY
Qty: 90 TABLET | Refills: 0 | Status: SHIPPED | OUTPATIENT
Start: 2018-12-27 | End: 2019-06-18

## 2018-12-27 NOTE — TELEPHONE ENCOUNTER
"Spoke with pt, pt given NP's communication and verbalized understanding.    Pt reports she can not afford $100 rx for Dexilant 60 mg so whe is not taking it.      Attempted to call pt, no answer, message received "the wireless customer you are trying to call is unavailable.  Will try again later.  "

## 2018-12-31 ENCOUNTER — TELEPHONE (OUTPATIENT)
Dept: FAMILY MEDICINE | Facility: CLINIC | Age: 68
End: 2018-12-31

## 2018-12-31 NOTE — TELEPHONE ENCOUNTER
Spoke with pt, pt reports she had side effects from taking medication Aciphex .  Pt reports she only took one but suffered shoulder pain and neck pain.  Pt reports she will not take any more and will wait until she sees the gastroenterologist at her upcoming appointment.        ----- Message from RT Malissa sent at 12/31/2018  7:47 AM CST -----  Contact: pt    pt , requesting medial advise concerning her medication: Rabeprazole thanks.

## 2019-01-11 ENCOUNTER — TELEPHONE (OUTPATIENT)
Dept: PODIATRY | Facility: CLINIC | Age: 69
End: 2019-01-11

## 2019-01-11 NOTE — TELEPHONE ENCOUNTER
----- Message from Tashia Ware sent at 1/11/2019 10:20 AM CST -----  Morning, I'm confused she has procedure with Dr. Plaza on 2/1/19? She stated due to the endoscopy she has scheduled on 1/29/19, she needed to reschedule her procedure and pre-op with Dr. Plaza for 1/29/19 and 2/1/19. Sorry if I didn't make it clear    ----- Message -----  From: Sunitha Aguayo LPN  Sent: 1/11/2019  10:12 AM  To: Tashia Ware    Good Morning....wrong office this will need to go to whoever's office ordered the test.  ----- Message -----  From: Tashia Ware  Sent: 1/11/2019  10:05 AM  To: Mela Red Staff    Type: Needs Medical Advice    Who Called:  Patient    Best Call Back Number: [phone] (home)     Additional Information: Stating will be having a endoscopy on 1/29/19, will need to reschedule pre-op and procedure on 2/1/19

## 2019-01-25 ENCOUNTER — OFFICE VISIT (OUTPATIENT)
Dept: PODIATRY | Facility: CLINIC | Age: 69
End: 2019-01-25
Payer: MEDICARE

## 2019-01-25 VITALS
BODY MASS INDEX: 25.11 KG/M2 | TEMPERATURE: 97 F | DIASTOLIC BLOOD PRESSURE: 71 MMHG | WEIGHT: 160 LBS | SYSTOLIC BLOOD PRESSURE: 121 MMHG | HEART RATE: 77 BPM | HEIGHT: 67 IN

## 2019-01-25 DIAGNOSIS — M79.675 PAIN IN TOE OF LEFT FOOT: ICD-10-CM

## 2019-01-25 DIAGNOSIS — M72.2 PLANTAR FASCIITIS: ICD-10-CM

## 2019-01-25 DIAGNOSIS — M76.61 ACHILLES TENDINITIS OF RIGHT LOWER EXTREMITY: ICD-10-CM

## 2019-01-25 PROCEDURE — 99214 PR OFFICE/OUTPT VISIT, EST, LEVL IV, 30-39 MIN: ICD-10-PCS | Mod: S$GLB,,, | Performed by: PODIATRIST

## 2019-01-25 PROCEDURE — 3074F PR MOST RECENT SYSTOLIC BLOOD PRESSURE < 130 MM HG: ICD-10-PCS | Mod: CPTII,S$GLB,, | Performed by: PODIATRIST

## 2019-01-25 PROCEDURE — 1101F PR PT FALLS ASSESS DOC 0-1 FALLS W/OUT INJ PAST YR: ICD-10-PCS | Mod: CPTII,S$GLB,, | Performed by: PODIATRIST

## 2019-01-25 PROCEDURE — 99999 PR PBB SHADOW E&M-EST. PATIENT-LVL III: CPT | Mod: PBBFAC,,, | Performed by: PODIATRIST

## 2019-01-25 PROCEDURE — 3074F SYST BP LT 130 MM HG: CPT | Mod: CPTII,S$GLB,, | Performed by: PODIATRIST

## 2019-01-25 PROCEDURE — 99214 OFFICE O/P EST MOD 30 MIN: CPT | Mod: S$GLB,,, | Performed by: PODIATRIST

## 2019-01-25 PROCEDURE — 3078F PR MOST RECENT DIASTOLIC BLOOD PRESSURE < 80 MM HG: ICD-10-PCS | Mod: CPTII,S$GLB,, | Performed by: PODIATRIST

## 2019-01-25 PROCEDURE — 1101F PT FALLS ASSESS-DOCD LE1/YR: CPT | Mod: CPTII,S$GLB,, | Performed by: PODIATRIST

## 2019-01-25 PROCEDURE — 3078F DIAST BP <80 MM HG: CPT | Mod: CPTII,S$GLB,, | Performed by: PODIATRIST

## 2019-01-25 PROCEDURE — 99999 PR PBB SHADOW E&M-EST. PATIENT-LVL III: ICD-10-PCS | Mod: PBBFAC,,, | Performed by: PODIATRIST

## 2019-01-25 RX ORDER — ZOSTER VACCINE RECOMBINANT, ADJUVANTED 50 MCG/0.5
KIT INTRAMUSCULAR
Refills: 0 | COMMUNITY
Start: 2019-01-01 | End: 2019-09-12

## 2019-01-25 RX ORDER — DICLOFENAC SODIUM 10 MG/G
2 GEL TOPICAL 3 TIMES DAILY
Qty: 100 G | Refills: 2 | Status: SHIPPED | OUTPATIENT
Start: 2019-01-25 | End: 2019-09-12

## 2019-01-25 RX ORDER — LEVOTHYROXINE SODIUM 100 UG/1
100 TABLET ORAL DAILY
COMMUNITY
End: 2019-02-04 | Stop reason: SDUPTHER

## 2019-01-27 NOTE — PROGRESS NOTES
Subjective:      Patient ID: Kathi Head is a 68 y.o. female.    Chief Complaint: Foot Pain and Foot Problem  Patient presents with complaint of pain 3rd digit left foot.  Reports no previous problems and no   changes with shoes or activities.  She has a history of right heel pain, has seen Dr. Teofilo Plaza,   reports heels spurs which are going to require surgery.  She relates she uses a lot of cushion in the   heels of her shoes to help with this discomfort, however that is not new and she has been using the   same amount in the same shoes for while.  Pain level 2/10      ROS     Constitutional    Pleasant, well-nourished, no distress, well oriented    Eyes         GLASSES    Cardiovascular          No chest pain, no shortness of breath    Respiratory           No cough, no congestion     Gastrointestinal          Severe GERD    Musculoskeletal         YES muscle aches, YES arthralgias/joint pain, no swelling in the extremities           Objective:      Physical Exam  Vascular         Arterial Pulses Right: posterior tibialis 2/4, dorsalis pedis 2/4, normal CFT   Arterial Pulses Left: posterior tibialis 2/4, dorsalis pedis 2/4, normal CFT   No lower extremity edema bilateral   Pedal skin temperature and color are normal bilateral     Integumentary   Contracted third digit left foot with painful hyperkeratotic tissue and IPK, mild erythema and        edema, no calor or drainage.      Mild bursitis posterior right heel, no calor         Neurological   Gross sensation intact bilateral     Musculoskeletal   Muscle Strength/Testing and Tone:  Intact, normal tone bilateral   Joints, Bones, and Muscles: Ankle joint equinus R>L, pain at achilles tendon insertion right       with mild bursitis  Positive plantar fascial pain at the insertion of the calcaneus right foot   contracted 3rd digit left foot with painful lesion as noted above        Walks well unassisted    Radiographs   8/29/2018  FINDINGS:  No  acute fracture or dislocation.  No significant soft tissue swelling.    The joint spaces are preserved.  The tarsal bones are intact with mild changes of degenerative osteoarthrosis.  Normal tarsometatarsal alignment.    Small plantar calcaneal spur.  Prominent enthesophytes formation at the insertion of the Achilles tendon.      Impression       1. Mild changes of degenerative osteoarthrosis.  2. Small calcaneal spur.             Assessment:       Encounter Diagnoses   Name Primary?    Pain in toe of left foot     Plantar fasciitis     Achilles tendinitis of right lower extremity          Plan:       Kathi was seen today for foot pain and foot problem.    Diagnoses and all orders for this visit:    Pain in toe of left foot    Plantar fasciitis    Achilles tendinitis of right lower extremity    Other orders  -     diclofenac sodium (VOLTAREN) 1 % Gel; Apply 2 g topically 3 (three) times daily.      Addressed contracted 3rd digit left foot with patient, IPK was debrided, patient noted immediate  relief.  We discussed soft padding and appropriate shoes to help avoid recurrence.  Advised   patient with the significant amount of cushion she has under the heels of her shoes this may   have caused additional pressure on the toes.  We reviewed Achilles and plantar fasciitis, spurs   in both areas of the heel.  Recommended patient try cortisone/steroid injection prior to surgery   since she is having severe acid reflux and is in the process of continuing testing for this before   she pursues foot surgery.  Advised patient injection would be on the bottom of the heel for  plantar fasciitis and may offer significant relief, may or may not need 1 on the back of the heel.  Would recommend she exhaust all conservative treatments before surgery.  Advised patient if   cortisone is effective at reducing inflammation it may resolve her pain regardless of the spurs.    Explained to patient rather than multiple heel cushions she  should utilize an arch support with   a  small heel left.  Discussed example of this and explained how to wear  and adjust to properly   in her shoes.  Reviewed ice/ cool therapy and frequency this should be performed for the right heel.  Prescribed Voltaren gel and explained how to apply to the right heel 3 times daily.  Discussed light stretching for this area in the morning and anytime before getting up from the   seated position.  Patient was in understanding and agreement with treatment plan  I counseled the patient on her conditions, their implications and medical management.  Instructed patient to contact the office with any changes, questions, concerns, worsening of   symptoms, or if not significant improvement in the next 1-2 weeks with ice, Voltaren Gel,   stretching and arch support,  we can consider injection at that time.   Patient verbalized understanding.   Total face to face time, exam, assessment, treatment, discussion, documentation 25 minutes,   more than half this time spent on consultation and coordination of care.   Follow up as needed      This note was created using [employer] voice recognition software that occasionally misinterpreted   phrases or words.

## 2019-02-01 ENCOUNTER — TELEPHONE (OUTPATIENT)
Dept: FAMILY MEDICINE | Facility: CLINIC | Age: 69
End: 2019-02-01

## 2019-02-01 DIAGNOSIS — E03.9 HYPOTHYROIDISM, UNSPECIFIED TYPE: Primary | ICD-10-CM

## 2019-02-01 NOTE — TELEPHONE ENCOUNTER
"+Spoke with pt. States "[facility] is reminding me of my thyroid Rx re order. I think DO said I needed labs again. So can we get the labs so I can get my Rx?". Also states "I am weaning off gabapentin. She said to take one every other day and I'm not sure when to stop".   Please advise.     ----- Message from RT Malissa sent at 2/1/2019 11:29 AM CST -----  Contact: pt    pt , requesting a call back soon to provide results of how her medications are working, thanks.    "

## 2019-02-04 RX ORDER — LEVOTHYROXINE SODIUM 100 UG/1
100 TABLET ORAL DAILY
Qty: 90 TABLET | Refills: 0 | Status: ON HOLD | OUTPATIENT
Start: 2019-02-04 | End: 2021-07-09 | Stop reason: SDUPTHER

## 2019-02-04 NOTE — TELEPHONE ENCOUNTER
I'll refill her thyroid medication; she'll need repeat thyroid levels and an appt in 2-3 months. She can go ahead and stop the gabapentin. Thanks

## 2019-02-04 NOTE — TELEPHONE ENCOUNTER
"Spoke with pt. Informed of DO communication. Verbalized an understanding. States "Sometimes I it gives me pain in my arms but I will take the thyroid medication and re do my labs within 2-3 months".   "

## 2019-02-08 ENCOUNTER — OFFICE VISIT (OUTPATIENT)
Dept: PODIATRY | Facility: CLINIC | Age: 69
End: 2019-02-08
Payer: MEDICARE

## 2019-02-08 VITALS
WEIGHT: 160 LBS | HEART RATE: 79 BPM | DIASTOLIC BLOOD PRESSURE: 68 MMHG | SYSTOLIC BLOOD PRESSURE: 107 MMHG | TEMPERATURE: 98 F | BODY MASS INDEX: 25.11 KG/M2 | HEIGHT: 67 IN

## 2019-02-08 DIAGNOSIS — M77.31 CALCANEAL SPUR OF FOOT, RIGHT: ICD-10-CM

## 2019-02-08 DIAGNOSIS — G57.51 TARSAL TUNNEL SYNDROME OF RIGHT SIDE: ICD-10-CM

## 2019-02-08 DIAGNOSIS — M76.61 ACHILLES TENDINITIS OF RIGHT LOWER EXTREMITY: Primary | ICD-10-CM

## 2019-02-08 DIAGNOSIS — M72.2 PLANTAR FASCIITIS: ICD-10-CM

## 2019-02-08 PROCEDURE — 99214 PR OFFICE/OUTPT VISIT, EST, LEVL IV, 30-39 MIN: ICD-10-PCS | Mod: 25,S$GLB,, | Performed by: PODIATRIST

## 2019-02-08 PROCEDURE — 3078F PR MOST RECENT DIASTOLIC BLOOD PRESSURE < 80 MM HG: ICD-10-PCS | Mod: CPTII,S$GLB,, | Performed by: PODIATRIST

## 2019-02-08 PROCEDURE — 3078F DIAST BP <80 MM HG: CPT | Mod: CPTII,S$GLB,, | Performed by: PODIATRIST

## 2019-02-08 PROCEDURE — 3074F PR MOST RECENT SYSTOLIC BLOOD PRESSURE < 130 MM HG: ICD-10-PCS | Mod: CPTII,S$GLB,, | Performed by: PODIATRIST

## 2019-02-08 PROCEDURE — 1101F PR PT FALLS ASSESS DOC 0-1 FALLS W/OUT INJ PAST YR: ICD-10-PCS | Mod: CPTII,S$GLB,, | Performed by: PODIATRIST

## 2019-02-08 PROCEDURE — 99999 PR PBB SHADOW E&M-EST. PATIENT-LVL III: ICD-10-PCS | Mod: PBBFAC,,, | Performed by: PODIATRIST

## 2019-02-08 PROCEDURE — 96372 PR INJECTION,THERAP/PROPH/DIAG2ST, IM OR SUBCUT: ICD-10-PCS | Mod: RT,S$GLB,, | Performed by: PODIATRIST

## 2019-02-08 PROCEDURE — 1101F PT FALLS ASSESS-DOCD LE1/YR: CPT | Mod: CPTII,S$GLB,, | Performed by: PODIATRIST

## 2019-02-08 PROCEDURE — 99999 PR PBB SHADOW E&M-EST. PATIENT-LVL III: CPT | Mod: PBBFAC,,, | Performed by: PODIATRIST

## 2019-02-08 PROCEDURE — 3074F SYST BP LT 130 MM HG: CPT | Mod: CPTII,S$GLB,, | Performed by: PODIATRIST

## 2019-02-08 PROCEDURE — 96372 THER/PROPH/DIAG INJ SC/IM: CPT | Mod: RT,S$GLB,, | Performed by: PODIATRIST

## 2019-02-08 PROCEDURE — 99214 OFFICE O/P EST MOD 30 MIN: CPT | Mod: 25,S$GLB,, | Performed by: PODIATRIST

## 2019-02-08 RX ORDER — CELECOXIB 200 MG/1
200 CAPSULE ORAL DAILY
Qty: 30 CAPSULE | Refills: 2 | Status: SHIPPED | OUTPATIENT
Start: 2019-02-08 | End: 2019-08-29

## 2019-02-08 RX ORDER — BETAMETHASONE SODIUM PHOSPHATE AND BETAMETHASONE ACETATE 3; 3 MG/ML; MG/ML
18 INJECTION, SUSPENSION INTRA-ARTICULAR; INTRALESIONAL; INTRAMUSCULAR; SOFT TISSUE
Status: COMPLETED | OUTPATIENT
Start: 2019-02-08 | End: 2019-02-08

## 2019-02-08 RX ADMIN — BETAMETHASONE SODIUM PHOSPHATE AND BETAMETHASONE ACETATE 18 MG: 3; 3 INJECTION, SUSPENSION INTRA-ARTICULAR; INTRALESIONAL; INTRAMUSCULAR; SOFT TISSUE at 01:02

## 2019-02-12 NOTE — PROGRESS NOTES
Subjective:      Patient ID: Kathi Head is a 68 y.o. female.    Chief Complaint: Follow-up; Foot Pain; Foot Problem; and Heel Pain  Patient presents with complaint of continued pain on the back of the left heel.  She relates radiating   pain, no improvement with conservative treatments, we had discussed cortisone injection prior to   surgical intervention.  Patient relates she would like to consider injection today, she cannot do any   type of activity due to this pain.      ROS     Constitutional    Pleasant, well-nourished, no distress, well oriented    Eyes         GLASSES    Cardiovascular          No chest pain, no shortness of breath    Respiratory           No cough, no congestion     Gastrointestinal          Severe GERD    Musculoskeletal         YES muscle aches, YES arthralgias/joint pain, no swelling in the extremities           Objective:      Physical Exam  Vascular         Arterial Pulses Right: posterior tibialis 2/4, dorsalis pedis 2/4, normal CFT   No lower extremity edema   Pedal skin temperature and color are normal    Integumentary       Mild bursitis posterior right heel, no calor         Neurological   Gross sensation intact, mild neuritis of the tarsal tunnel right     Musculoskeletal   Muscle Strength/Testing and Tone:  Intact, normal tone  Joints, Bones, and Muscles: Ankle joint equinus R>L  Pain at achilles tendon insertion right is in a very specific area on the very medial aspect of the      tendon with mild bursitis  Mildplantar fascial pain  right most likely due to compensating       Reviewed  Radiographs   8/29/2018  FINDINGS:  No acute fracture or dislocation.  No significant soft tissue swelling.    The joint spaces are preserved.  The tarsal bones are intact with mild changes of degenerative osteoarthrosis.  Normal tarsometatarsal alignment.    Small plantar calcaneal spur.  Prominent enthesophytes formation at the insertion of the Achilles tendon.      Impression        1. Mild changes of degenerative osteoarthrosis.  2. Small calcaneal spur.             Assessment:       Encounter Diagnoses   Name Primary?    Achilles tendinitis of right lower extremity Yes    Calcaneal spur of foot, right     Tarsal tunnel syndrome of right side     Plantar fasciitis          Plan:       Kathi was seen today for follow-up, foot pain, foot problem and heel pain.    Diagnoses and all orders for this visit:    Achilles tendinitis of right lower extremity    Calcaneal spur of foot, right    Tarsal tunnel syndrome of right side    Plantar fasciitis    Other orders  -     betamethasone acetate-betamethasone sodium phosphate injection 18 mg  -     celecoxib (CELEBREX) 200 MG capsule; Take 1 capsule (200 mg total) by mouth once daily.      We reviewed and discussed xrays.  Explained to patient her pain is in a very specific location, majority of her symptoms / radiating pain    involving the adjacent nerve.  Advised patient would like to proceed with caution and recommend IM   cortisone injection.   Explained cortisone is very effective in reducing inflammation, we discussed   potential side effects and length of time injection may be beneficial.    Advised patient this will be diagnostic as well, we can entertain injection in the region of the Achilles   tendon if needed.  If that occurs would recommend immobilization afterwards and physical therapy.    We also discussed oral anti-inflammatory.  Recommended Celebrex.  Discussed potential side   effects, take with food 1 daily, discontinue of stomach upset  Patient was in understanding and agreement with treatment plan.  Administered 3 cc betamethasone IM right hip.  Instructed patient to continue with appropriate tennis   shoes with arch supports, light stretching, ice and Voltaren gel  I counseled the patient on her conditions, their implications and medical management.  Instructed patient to contact the office with any changes, questions,  concerns, worsening of   symptoms.   Patient verbalized understanding.   Total face to face time, exam, assessment, treatment, discussion, documentation 25 minutes,   more than half this time spent on consultation and coordination of care.   Follow up 1 week.    This note was created using [employer] voice recognition software that occasionally misinterpreted   phrases or words.

## 2019-02-15 ENCOUNTER — OFFICE VISIT (OUTPATIENT)
Dept: PODIATRY | Facility: CLINIC | Age: 69
End: 2019-02-15
Payer: MEDICARE

## 2019-02-15 VITALS
SYSTOLIC BLOOD PRESSURE: 101 MMHG | DIASTOLIC BLOOD PRESSURE: 69 MMHG | WEIGHT: 160 LBS | TEMPERATURE: 98 F | HEIGHT: 67 IN | HEART RATE: 73 BPM | BODY MASS INDEX: 25.11 KG/M2

## 2019-02-15 DIAGNOSIS — M77.31 CALCANEAL SPUR OF FOOT, RIGHT: ICD-10-CM

## 2019-02-15 DIAGNOSIS — M72.2 PLANTAR FASCIITIS: ICD-10-CM

## 2019-02-15 DIAGNOSIS — G57.51 TARSAL TUNNEL SYNDROME OF RIGHT SIDE: ICD-10-CM

## 2019-02-15 DIAGNOSIS — G57.91 NEURITIS OF RIGHT FOOT: ICD-10-CM

## 2019-02-15 DIAGNOSIS — M76.61 ACHILLES TENDINITIS OF RIGHT LOWER EXTREMITY: Primary | ICD-10-CM

## 2019-02-15 PROCEDURE — 1101F PT FALLS ASSESS-DOCD LE1/YR: CPT | Mod: CPTII,S$GLB,, | Performed by: PODIATRIST

## 2019-02-15 PROCEDURE — 99999 PR PBB SHADOW E&M-EST. PATIENT-LVL III: CPT | Mod: PBBFAC,,, | Performed by: PODIATRIST

## 2019-02-15 PROCEDURE — 3078F PR MOST RECENT DIASTOLIC BLOOD PRESSURE < 80 MM HG: ICD-10-PCS | Mod: CPTII,S$GLB,, | Performed by: PODIATRIST

## 2019-02-15 PROCEDURE — 20550 NJX 1 TENDON SHEATH/LIGAMENT: CPT | Mod: RT,S$GLB,, | Performed by: PODIATRIST

## 2019-02-15 PROCEDURE — 20550 PR INJECT TENDON SHEATH/LIGAMENT: ICD-10-PCS | Mod: RT,S$GLB,, | Performed by: PODIATRIST

## 2019-02-15 PROCEDURE — 99214 OFFICE O/P EST MOD 30 MIN: CPT | Mod: 25,S$GLB,, | Performed by: PODIATRIST

## 2019-02-15 PROCEDURE — 99214 PR OFFICE/OUTPT VISIT, EST, LEVL IV, 30-39 MIN: ICD-10-PCS | Mod: 25,S$GLB,, | Performed by: PODIATRIST

## 2019-02-15 PROCEDURE — 3078F DIAST BP <80 MM HG: CPT | Mod: CPTII,S$GLB,, | Performed by: PODIATRIST

## 2019-02-15 PROCEDURE — 3074F SYST BP LT 130 MM HG: CPT | Mod: CPTII,S$GLB,, | Performed by: PODIATRIST

## 2019-02-15 PROCEDURE — 3074F PR MOST RECENT SYSTOLIC BLOOD PRESSURE < 130 MM HG: ICD-10-PCS | Mod: CPTII,S$GLB,, | Performed by: PODIATRIST

## 2019-02-15 PROCEDURE — 99999 PR PBB SHADOW E&M-EST. PATIENT-LVL III: ICD-10-PCS | Mod: PBBFAC,,, | Performed by: PODIATRIST

## 2019-02-15 PROCEDURE — 1101F PR PT FALLS ASSESS DOC 0-1 FALLS W/OUT INJ PAST YR: ICD-10-PCS | Mod: CPTII,S$GLB,, | Performed by: PODIATRIST

## 2019-02-15 RX ORDER — DULOXETIN HYDROCHLORIDE 30 MG/1
30 CAPSULE, DELAYED RELEASE ORAL DAILY
Qty: 30 CAPSULE | Refills: 0 | Status: SHIPPED | OUTPATIENT
Start: 2019-02-15 | End: 2019-08-29

## 2019-02-15 RX ORDER — LIDOCAINE HYDROCHLORIDE 10 MG/ML
2 INJECTION INFILTRATION; PERINEURAL
Status: COMPLETED | OUTPATIENT
Start: 2019-02-15 | End: 2019-02-15

## 2019-02-15 RX ORDER — METHYLPREDNISOLONE ACETATE 80 MG/ML
80 INJECTION, SUSPENSION INTRA-ARTICULAR; INTRALESIONAL; INTRAMUSCULAR; SOFT TISSUE ONCE
Status: COMPLETED | OUTPATIENT
Start: 2019-02-15 | End: 2019-02-15

## 2019-02-15 RX ORDER — DEXAMETHASONE SODIUM PHOSPHATE 4 MG/ML
4 INJECTION, SOLUTION INTRA-ARTICULAR; INTRALESIONAL; INTRAMUSCULAR; INTRAVENOUS; SOFT TISSUE ONCE
Status: COMPLETED | OUTPATIENT
Start: 2019-02-15 | End: 2019-02-15

## 2019-02-15 RX ORDER — PANTOPRAZOLE SODIUM 40 MG/1
TABLET, DELAYED RELEASE ORAL
COMMUNITY
Start: 2019-02-08 | End: 2019-08-29

## 2019-02-15 RX ADMIN — DEXAMETHASONE SODIUM PHOSPHATE 4 MG: 4 INJECTION, SOLUTION INTRA-ARTICULAR; INTRALESIONAL; INTRAMUSCULAR; INTRAVENOUS; SOFT TISSUE at 12:02

## 2019-02-15 RX ADMIN — LIDOCAINE HYDROCHLORIDE 2 ML: 10 INJECTION INFILTRATION; PERINEURAL at 12:02

## 2019-02-15 RX ADMIN — METHYLPREDNISOLONE ACETATE 80 MG: 80 INJECTION, SUSPENSION INTRA-ARTICULAR; INTRALESIONAL; INTRAMUSCULAR; SOFT TISSUE at 12:02

## 2019-02-18 NOTE — PROGRESS NOTES
Subjective:      Patient ID: Kathi Head is a 68 y.o. female.    Chief Complaint: Foot Pain; Foot Problem; and Follow-up  Patient presents  For follow-up chronic Achilles tendinitis right with compensatory plantar fasciitis.  Patient reports no improvement with IM cortisone injection last visit.   She relates increased burning   pain in the inside of the foot and ankle, will have it and both upper legs as well.  She states she took   Gabapentin for period of time a while ago but weaned herself off of it.  She feels she may need to   start this medication again.  Patient inquires about taking Cymbalta. Patient would like to pursue   injection of the right Achilles tendon today which we discussed at length last 2 visits.  She wants to   avoid surgery and try all conservative treatments 1st.  Feels she needs to get very aggressive on   treating this since her pain his not improved at all with shoes, arch support, ice, oral and topical   anti-inflammatory.  She reports pain in her right heel and burning and tingling have significantly   affected her activity and daily living  Pain level 4/10      ROS     Constitutional    Pleasant, well-nourished, no distress, well oriented    Eyes         GLASSES    Cardiovascular          No chest pain, no shortness of breath    Respiratory           No cough, no congestion     Gastrointestinal          Severe GERD    Musculoskeletal         YES muscle aches, YES arthralgias/joint pain, no swelling in the extremities           Objective:      Physical Exam  Vascular         Arterial Pulses Right: posterior tibialis 2/4, dorsalis pedis 2/4, normal CFT   No lower extremity edema   Pedal skin temperature and color are normal    Integumentary       Mild bursitis posterior right heel, no calor         Neurological   Gross sensation intact, increased neuritis pain involving tarsal tunnel right     Musculoskeletal   Muscle Strength/Testing and Tone:  Intact, normal tone  Joints,  Bones, and Muscles: Ankle joint equinus R>L  Pain at achilles tendon insertion right remains very specific to area on the very medial aspect    of the  tendon with mild bursitis  Mildplantar fascial pain  right most likely due to compensating           Assessment:       Encounter Diagnoses   Name Primary?    Achilles tendinitis of right lower extremity Yes    Calcaneal spur of foot, right     Tarsal tunnel syndrome of right side     Plantar fasciitis     Neuritis of right foot          Plan:       Kathi was seen today for foot pain, foot problem and follow-up.    Diagnoses and all orders for this visit:    Achilles tendinitis of right lower extremity    Calcaneal spur of foot, right    Tarsal tunnel syndrome of right side    Plantar fasciitis    Neuritis of right foot    Other orders  -     dexamethasone injection 4 mg  -     methylPREDNISolone acetate injection 80 mg  -     lidocaine HCL 10 mg/ml (1%) injection 2 mL  -     DULoxetine (CYMBALTA) 30 MG capsule; Take 1 capsule (30 mg total) by mouth once daily.        We reviewed neuritis at length, tarsal tunnel, symptoms in her legs.  We discussed Cymbalta   at length as well, potential side effects and starting out medication low, advised patient can be   increased if needed.  Explained the patient she needs to follow-up with her PCP regarding the   burning and tingling pain in her upper legs.  Patient verbalized understanding.  Started patient on 1-30 mg capsule daily of Cymbalta.  Instructed patient to contact the office   with any side effects.   We had a lengthy discussion regarding injection the right heel.  Advised patient I feel this could   be very beneficial since her pain is located to a specific area.  Advised patient she can have   increased pain for 3-4 days until cortisone takes effect.  We discussed effectiveness of cortisone   in decreasing inflammation, potential side effects and length of time injection may be beneficial.    Advised patient  she still needs to follow all conservative treatments as well, including Celebrex.    We reviewed this medication, take with food twice daily for 1 week, then once daily, discontinue   of stomach upset.  Explained to patient following injection she has significantly reduce her activity   limit for at least 1 week, always having shoes and proper support on even indoors.  Patient   verbalized understanding and related she did want to pursue injection today.  Patient was   administered injection utilizing above medications and medial aspect of the Achilles tendon and   insertion right foot.  Patient tolerated well.  Reviewed appropriate shoes with arch supports, light stretching, ice and Voltaren gel.  I counseled the patient on her conditions, their implications and medical management.  Instructed patient to contact the office with any changes, questions, concerns, worsening of   symptoms.   Patient verbalized understanding.   Total face to face time, exam, assessment, treatment, discussion, documentation 25 minutes,   more than half this time spent on consultation and coordination of care.   Additional time required   for procedure/injection Achilles tendon right  Follow up 2 weeks.    This note was created using [employer] voice recognition software that occasionally misinterpreted   phrases or words.

## 2019-02-25 DIAGNOSIS — J32.4 CHRONIC PANSINUSITIS: Primary | ICD-10-CM

## 2019-02-25 DIAGNOSIS — J34.3 HYPERTROPHY OF NASAL TURBINATES: ICD-10-CM

## 2019-02-28 ENCOUNTER — HOSPITAL ENCOUNTER (OUTPATIENT)
Dept: RADIOLOGY | Facility: HOSPITAL | Age: 69
Discharge: HOME | End: 2019-02-28
Attending: OTOLARYNGOLOGY
Payer: MEDICARE

## 2019-02-28 DIAGNOSIS — J32.4 CHRONIC PANSINUSITIS: ICD-10-CM

## 2019-02-28 DIAGNOSIS — J34.3 HYPERTROPHY OF NASAL TURBINATES: ICD-10-CM

## 2019-02-28 PROCEDURE — 70486 CT MAXILLOFACIAL W/O DYE: CPT | Mod: TC

## 2019-02-28 PROCEDURE — 70486 CT MAXILLOFACIAL W/O DYE: CPT | Mod: 26,,, | Performed by: RADIOLOGY

## 2019-02-28 PROCEDURE — 70486 CT SINUSES WITHOUT CONTRAST: ICD-10-PCS | Mod: 26,,, | Performed by: RADIOLOGY

## 2019-03-07 ENCOUNTER — OFFICE VISIT (OUTPATIENT)
Dept: PODIATRY | Facility: CLINIC | Age: 69
End: 2019-03-07
Payer: MEDICARE

## 2019-03-07 VITALS
OXYGEN SATURATION: 99 % | DIASTOLIC BLOOD PRESSURE: 65 MMHG | HEART RATE: 95 BPM | SYSTOLIC BLOOD PRESSURE: 99 MMHG | RESPIRATION RATE: 18 BRPM | WEIGHT: 160 LBS | TEMPERATURE: 97 F | BODY MASS INDEX: 25.11 KG/M2 | HEIGHT: 67 IN

## 2019-03-07 DIAGNOSIS — G57.51 TARSAL TUNNEL SYNDROME OF RIGHT SIDE: ICD-10-CM

## 2019-03-07 DIAGNOSIS — M76.61 ACHILLES TENDINITIS OF RIGHT LOWER EXTREMITY: Primary | ICD-10-CM

## 2019-03-07 DIAGNOSIS — M77.31 CALCANEAL SPUR OF FOOT, RIGHT: ICD-10-CM

## 2019-03-07 PROCEDURE — 1101F PT FALLS ASSESS-DOCD LE1/YR: CPT | Mod: CPTII,S$GLB,, | Performed by: PODIATRIST

## 2019-03-07 PROCEDURE — 99214 OFFICE O/P EST MOD 30 MIN: CPT | Mod: S$GLB,,, | Performed by: PODIATRIST

## 2019-03-07 PROCEDURE — 3078F PR MOST RECENT DIASTOLIC BLOOD PRESSURE < 80 MM HG: ICD-10-PCS | Mod: CPTII,S$GLB,, | Performed by: PODIATRIST

## 2019-03-07 PROCEDURE — 3074F SYST BP LT 130 MM HG: CPT | Mod: CPTII,S$GLB,, | Performed by: PODIATRIST

## 2019-03-07 PROCEDURE — 3074F PR MOST RECENT SYSTOLIC BLOOD PRESSURE < 130 MM HG: ICD-10-PCS | Mod: CPTII,S$GLB,, | Performed by: PODIATRIST

## 2019-03-07 PROCEDURE — 3078F DIAST BP <80 MM HG: CPT | Mod: CPTII,S$GLB,, | Performed by: PODIATRIST

## 2019-03-07 PROCEDURE — 99214 PR OFFICE/OUTPT VISIT, EST, LEVL IV, 30-39 MIN: ICD-10-PCS | Mod: S$GLB,,, | Performed by: PODIATRIST

## 2019-03-07 PROCEDURE — 1101F PR PT FALLS ASSESS DOC 0-1 FALLS W/OUT INJ PAST YR: ICD-10-PCS | Mod: CPTII,S$GLB,, | Performed by: PODIATRIST

## 2019-03-07 PROCEDURE — 99999 PR PBB SHADOW E&M-EST. PATIENT-LVL III: ICD-10-PCS | Mod: PBBFAC,,, | Performed by: PODIATRIST

## 2019-03-07 PROCEDURE — 99999 PR PBB SHADOW E&M-EST. PATIENT-LVL III: CPT | Mod: PBBFAC,,, | Performed by: PODIATRIST

## 2019-03-07 RX ORDER — GABAPENTIN 300 MG/1
300 CAPSULE ORAL NIGHTLY
Qty: 90 CAPSULE | Refills: 0 | Status: SHIPPED | OUTPATIENT
Start: 2019-03-07 | End: 2019-06-05

## 2019-03-07 RX ORDER — DICLOFENAC SODIUM 10 MG/G
2 GEL TOPICAL 3 TIMES DAILY
Qty: 300 G | Refills: 0 | Status: SHIPPED | OUTPATIENT
Start: 2019-03-07 | End: 2019-06-05

## 2019-03-11 NOTE — PROGRESS NOTES
Subjective:      Patient ID: Kathi Head is a 68 y.o. female.    Chief Complaint: Follow-up  Patient presents for follow-up chronic Achilles tendinitis and tarsal tunnel right and neuropathy.   Patient reports injection Achilles tendon area right heel has helped significantly.  She has had much less pain, has some discomfort in the area and it does increase with activity but not as severe as it was previously.  She continues to wear arch supports with additional felt padding  On the heel.  Reports she is tolerating 30 mg Cymbalta daily just fine, helping a little but still increased pain at night.  She is not sure if it is more effective than the gabapentin she was taken a while ago.  She feels gabapentin helped with her night pain more.    ROS     Constitutional    Pleasant, well-nourished, no distress, well oriented    Eyes         GLASSES    Cardiovascular          No chest pain, no shortness of breath    Respiratory           No cough, no congestion     Gastrointestinal          Severe GERD    Musculoskeletal         YES muscle aches, YES arthralgias/joint pain, no swelling in the extremities    Neurologic         Reports previous history of neuropathy upper legs           Objective:      Physical Exam  Vascular         Arterial Pulses Right: posterior tibialis 2/4, dorsalis pedis 2/4, normal CFT   No lower extremity edema   Pedal skin temperature and color are normal    Integumentary       Mild bursitis posterior right heel resolved        Neurological   Gross sensation intact, minimal discomfort involving tarsal tunnel right     Musculoskeletal   Muscle Strength/Testing and Tone:  Intact, normal tone  Joints, Bones, and Muscles: Ankle joint equinus R>L  Significant decrease in pain at achilles tendon insertion medially right   No plantar fascial pain right           Assessment:       Encounter Diagnoses   Name Primary?    Achilles tendinitis of right lower extremity Yes    Tarsal tunnel syndrome  of right side     Calcaneal spur of foot, right          Plan:       Kathi was seen today for follow-up.    Diagnoses and all orders for this visit:    Achilles tendinitis of right lower extremity  -     Ambulatory consult to Physical Therapy    Tarsal tunnel syndrome of right side    Calcaneal spur of foot, right    Other orders  -     gabapentin (NEURONTIN) 300 MG capsule; Take 1 capsule (300 mg total) by mouth every evening.  -     diclofenac sodium (VOLTAREN) 1 % Gel; Apply 2 g topically 3 (three) times daily.        Advised patient there is significant improvement with injection Achilles tendon region right as well as nerve pain she was having close to this area involving the tarsal tunnel.  Advised patient that most likely was deep inflammation surrounding these to areas for such a long time of initial cortisone injection can be extremely helpful, however there are still some evidence of discomfort in these areas and can progress quickly especially with prolonged activity.  We discussed physical therapy at length and explained to patient this is crucial to prevent recurrence.  Also advised patient she will get more control from a shoe with a closed back rather than a open / slip-on.  She needs to utilize a light weight walking shoe thick insole, utilizing arch support with extra padding and explained how gradually adjust to wearing them comfortably.  Patient was in agreement with console for physical therapy.  Prescribed gabapentin 1 at night.  Instructed patient to continue 30 mg Cymbalta in the morning, we reviewed both of these medications,  potential side effects, instructed patient to contact the office with any questions or if not effective.  Reviewed shoes indoors, light stretching, ice and Voltaren gel.  Patient was in understanding and agreement with treatment plan.  I counseled the patient on her conditions, their implications and medical management.  Instructed patient to contact the office with  any changes, questions, concerns, worsening of symptoms.   Patient verbalized understanding.   Total face to face time, exam, assessment, treatment, discussion, documentation 25 minutes, more than half this time spent on consultation and coordination of care.     Follow up 6-8 weeks following physical therapy.    This note was created using [employer] voice recognition software that occasionally misinterpreted phrases or words.

## 2019-03-13 ENCOUNTER — CLINICAL SUPPORT (OUTPATIENT)
Dept: REHABILITATION | Facility: HOSPITAL | Age: 69
End: 2019-03-13
Attending: PODIATRIST
Payer: MEDICARE

## 2019-03-13 DIAGNOSIS — R26.2 DIFFICULTY WALKING: ICD-10-CM

## 2019-03-13 DIAGNOSIS — M76.61 ACHILLES TENDINITIS OF RIGHT LOWER EXTREMITY: Primary | ICD-10-CM

## 2019-03-13 PROCEDURE — G8979 MOBILITY GOAL STATUS: HCPCS | Mod: CI,PN

## 2019-03-13 PROCEDURE — 97161 PT EVAL LOW COMPLEX 20 MIN: CPT | Mod: PN

## 2019-03-13 PROCEDURE — G8978 MOBILITY CURRENT STATUS: HCPCS | Mod: CJ,PN

## 2019-03-13 NOTE — PLAN OF CARE
OUTPATIENT THERAPY AND WELLNESS  PHYSICAL THERAPY INITIAL EVALUATION    Name: Kathi Baker [place]  Clinic Number: [id number]    Evaluation Date: 3/13/2019  Visit #: 1 / 12  Authorization period Expiration: 12/31/2019  Plan of Care Expiration: 6/30/2019    Diagnosis:   Encounter Diagnoses   Name Primary?    Achilles tendinitis of right lower extremity Yes    Difficulty walking      Physician: Glenis Plaza DPM  Treatment Orders: PT Eval and Treat  Past Medical History:   Diagnosis Date    Allergy     GERD (gastroesophageal reflux disease)     Hyperlipidemia     Thyroid disease      has a current medication list which includes the following prescription(s): celecoxib, cholecalciferol (vitamin d3), dexlansoprazole, diclofenac sodium, diclofenac sodium, duloxetine, estradiol, gabapentin, levothyroxine, levothyroxine, pantoprazole, pravastatin, propylene glycol, rabeprazole, shingrix (pf), and sucralfate.  Review of patient's allergies indicates:  No Known Allergies    History   Prior Therapy: Kathi was seen for Achilles tendinitis of Right foot on 9/4/2018 - evaluation.  She was seen again on 9/6/2018 and reported improvement in her pain.  She then came into clinic and stated that Dr. Plaza wanted her to get Dry Needling and she wanted to do what he suggested so she was going to change clinics.  Patient now returns 6 months later - when asked about the Dry Needling she stated that she did not no anything about this.  She did not remember coming to this clinic in September for her foot problem.   Social History: ; lives here in [place].   Previous functional status: reports on/off pain in her right heel and foot; she recently had a steroid injection in her heel and stated that this has made a huge difference in her pain.   Current functional status: ambulatory without use of AD; currently reports 0/10 pain in her right foot; She has inserts for her shoes.   Work: retired.     Subjective   History  of Present Illness: Patient has chronic history of Right Achilles tendonitis; she has a bone spur on calcaneous. Recent steroid injection has significantly helped with her pain.  Kathi was confused on why she was coming to therapy - she thought it was because of her nerve pain in her legs.  She is c/o leg pain - stated that she is taking Gabapentin for this.  She also talked extensively about the post-nasal drip she had - stated that the drainage runs down the back of her throat and makes her nauseated. She indicated that she did not know anything about dry needling that she discussed with me last year, she seemed to perseverate about her leg pain and the drainage from her nose down her throat.   DOI: chronic   Pain: current 1/10, worst 4/10, best 0/10, Burning, intermittent  Radicular symptoms: patient denies any radicular symptoms from her foot; she does report generalized LE pain - bilateral from thighs to feet  Aggravating factors: walking can irritate her achilles issue  Easing factors: rest  Precautions: standard   Pts goals: To relieve all of her pain and increase her activity level     Objective   Mental status: alert, interactive, oriented x3  Posture/ Alignment: Good    GAIT DEVIATIONS: Kathi amb with no obvious gait deviations noted today.    ROM:      Right Left Comment   DF: 5/5 5/5    PF: 3+/5 4/5    Eversion: 5/5 5/5    Inversion: 5/5 5/5    1st MTP Ext: 5/5 5/5    1st MTP Flex: 5/5 5/5      PROM Right Left Comment   DF: +2 degrees +3 degrees    PF: 55 degrees 55 degrees    Eversion: 32 degrees 32 degrees    Inversion: 30 degrees 30 degrees    1st MTP Ext: 5 degrees 5 degrees    1st MTP Flex: 0 degrees 0 degrees    *pain  Strength: Hip and Knee Strength:  5/5 bilateral LE's     Palpation:  No tenderness noted along right achilles today.     Joint Play:  Stiffness noted in right ankle into dorsiflexion versus the left foot.     Pt/family was provided educational information, including: role of PT,  goals for PT, scheduling - pt verbalized understanding. Discussed insurance plan with pt.     Functional Limitations Reporting     Category: mobility  Tool:  LEFS Survey  Score: 29% Limitation   Current/ : CJ = at least 20% but < 40% impaired, limited or restricted  Goal/ : CI = at least 1% but < 20% impaired, limited or restricted      TREATMENT   Time In: 3:09 PM  Time Out: 4:00 PM    Discussed Plan of Care with patient: Yes      Written Home Exercises Provided: calf mm stretching on wedge   Exercises were reviewed and Kathi was able to demonstrate them prior to the end of the session. Pt received a written copy of exercises to perform at home. Kathi demonstrated good  understanding of the education provided.     Assessment   Kathi is a 68 y.o. female referred to outpatient physical therapy with a medical diagnosis of right achilles tendinitis. Demonstrates impairments including limitations as described in the problem list. Pt prognosis is Good. Positive prognostic factors include patient motivated to improve her activity level . Negative prognostic factors include none. Pt will benefit from skilled outpatient physical therapy to address the above stated deficits, provide pt/family education, and to maximize pt's level of independence.         Anticipated Barriers for therapy: none  Pt's spiritual, cultural and educational needs considered and pt agreeable to plan of care and goals as stated below:     Long Term GOALS:  In 6 weeks, pt. will:  1. Be independent with HEP and SX management   2. Demonstrate improvement in her right ankle dorsiflexion to +3 degrees  3. Demonstrate improvement in her right ankle plantarflexion strength by 1/2 grade  4. Demonstrate consistent wearing and compliance with proper shoe/insert to decrease stress on achilles tendon  5. LEFS improved to 20% limitation or less     Plan   Certification Period: 3/13/2019 to 6/30/2019.    Outpatient physical therapy 1-2 times weekly to  include: Gait Training, Manual Therapy, Neuromuscular Re-ed, Self Care and Therapeutic Exercise. Cont PT for 2 months.   Pt may be seen by PTA as part of the rehabilitation team.     I certify the need for these services furnished under this plan of treatment and while under my care.    Meena Ramirez, PT

## 2019-03-22 ENCOUNTER — CLINICAL SUPPORT (OUTPATIENT)
Dept: REHABILITATION | Facility: HOSPITAL | Age: 69
End: 2019-03-22
Attending: PODIATRIST
Payer: MEDICARE

## 2019-03-22 DIAGNOSIS — R26.2 DIFFICULTY WALKING: ICD-10-CM

## 2019-03-22 DIAGNOSIS — M76.61 ACHILLES TENDINITIS OF RIGHT LOWER EXTREMITY: Primary | ICD-10-CM

## 2019-03-22 DIAGNOSIS — M77.31 CALCANEAL SPUR OF FOOT, RIGHT: ICD-10-CM

## 2019-03-22 PROCEDURE — 97110 THERAPEUTIC EXERCISES: CPT | Mod: PN

## 2019-03-22 PROCEDURE — 97140 MANUAL THERAPY 1/> REGIONS: CPT | Mod: PN

## 2019-03-22 NOTE — PROGRESS NOTES
"                                                    Physical Therapy Daily Note     Name: Kathi Baker [place]  Clinic Number: [id number]  Diagnosis:   Encounter Diagnoses   Name Primary?    Achilles tendinitis of right lower extremity Yes    Calcaneal spur of foot, right     Difficulty walking      Physician: Glenis Plaza DPM  Precautions: standard  Visit #: 2 of 12  PTA Visit #: 0  Time In: 11:00 PM  Time Out: 12:00 PM    Subjective     Pt reports: My ankle/foot are doing well.  "As long as I wear these shoes, my foot feels fine" Kathi 's worried about what she will do in the summer.  Explained to her that she just needs to find sandals that have a good arch support in them - this is important to preventing reoccurrence of her achilles/heel pain again.   Pain Scale: Kathi rates pain on a scale of 0-10 to be 0/10 currently.    Objective     Kathi received individual therapeutic exercises to develop strength and core stabilization for 35 minutes includin. Elliptical - Level 3  - only lasted 2 mins  2. Achilles Stretch   3. Heel Raises - 3 way - 15 reps each   4. SLR x 3 with 2# - 15 reps each   5. Ball Squeezes x 3 mins   6. Bridges x 20     Kathi received the following manual therapy techniques: Soft tissue Mobilization were applied to the: right achilles  for 10 minutes including:  IASTM to right achilles and calf mm - friction massage to medial/lateral aspects of achilles followed by passive and active stretching.      The patient received the following direct contact modalities after being cleared for contraindications:     The patient received the following supervised modalities after being cleared for contradictions:     Written Home Exercises Provided: How to use her Pro-Stretch - stated that she had one at home, but didn't know what to do with it.   Pt demo good understanding of the education provided. Kathi demonstrated good return demonstration of activities.     Education provided re:  Kathi " verbalized good understanding of education provided.   No spiritual or educational barriers to learning provided    Assessment     Patient tolerated treatment well.  It is difficult to get information /updated status from Kathi - she is very ambiquous with her answers, and has a very difficult time explaining herself.  She is not familiar with the effects of exercise - needs some education regarding this.   This is a 68 y.o. female referred to outpatient physical therapy and presents with a medical diagnosis of achilles tendonitis  and demonstrates limitations as described in the problem list. Pt prognosis is Good. Pt will continue to benefit from skilled outpatient physical therapy to address the deficits listed in the problem list, provide pt/family education and to maximize pt's level of independence in the home and community environment.     Goals as follows:  Long Term GOALS:  In 6 weeks, pt. will:  1. Be independent with HEP and SX management   2. Demonstrate improvement in her right ankle dorsiflexion to +3 degrees  3. Demonstrate improvement in her right ankle plantarflexion strength by 1/2 grade  4. Demonstrate consistent wearing and compliance with proper shoe/insert to decrease stress on achilles tendon  5. LEFS improved to 20% limitation or less         Plan     Continue with established Plan of Care towards PT goals.    Therapist: Meena Ramirez, PT  3/22/2019

## 2019-03-27 ENCOUNTER — CLINICAL SUPPORT (OUTPATIENT)
Dept: REHABILITATION | Facility: HOSPITAL | Age: 69
End: 2019-03-27
Attending: PODIATRIST
Payer: MEDICARE

## 2019-03-27 DIAGNOSIS — R26.2 DIFFICULTY WALKING: ICD-10-CM

## 2019-03-27 DIAGNOSIS — M76.61 ACHILLES TENDINITIS OF RIGHT LOWER EXTREMITY: Primary | ICD-10-CM

## 2019-03-27 PROCEDURE — 97110 THERAPEUTIC EXERCISES: CPT | Mod: PN

## 2019-03-27 NOTE — PROGRESS NOTES
Physical Therapy Daily Note     Name: Kathi Baker [facility] Number: [id number]  Diagnosis:   Encounter Diagnoses   Name Primary?    Difficulty walking     Achilles tendinitis of right lower extremity Yes     Physician: Glenis Plaza DPM  Precautions: standard  Visit #: 3 of 12  PTA Visit #: 0  Time In: 2:00 PM  Time Out: 3:00 PM    Subjective     Pt reports: My ankle/foot are doing well. Kathi reports that she has had no reoccurrence on her achilles pain    Pain Scale: Kathi rates pain on a scale of 0-10 to be 0/10 currently.    Objective     Kathi received individual therapeutic exercises to develop strength and core stabilization for 35 minutes includin. Elliptical - Level 3  - 10 mins today   2. Achilles Stretch   3. Heel Raises - 3 way - 15 reps each   4. SLR x  with 2# - 10 x 2 reps each   5. Ball Squeezes x 3 mins   6. Bridges x 20   7. Side Lying Hip Abduction 2# 10 x 2  8. Prone - Alternate arm and leg x 10  9. Vigor Gym - Level 9 x 10 mins     Kathi received the following manual therapy techniques: Soft tissue Mobilization were applied to the: right achilles  for 10 minutes including:  IASTM to right achilles and calf mm - friction massage to medial/lateral aspects of achilles followed by passive and active stretching.      The patient received the following direct contact modalities after being cleared for contraindications:     The patient received the following supervised modalities after being cleared for contradictions:     Written Home Exercises Provided: How to use her Pro-Stretch - stated that she had one at home, but didn't know what to do with it.   Pt demo good understanding of the education provided. Kathi demonstrated good return demonstration of activities.     Education provided re:  Kathi verbalized good understanding of education provided.   No spiritual or educational barriers to learning provided    Assessment      Patient tolerated treatment well. She did a much better job with all of her exercises today; Able to increase her time on the Elliptical from 2 to 10 mins today. This is a 68 y.o. female referred to outpatient physical therapy and presents with a medical diagnosis of achilles tendonitis  and demonstrates limitations as described in the problem list. Pt prognosis is Good. Pt will continue to benefit from skilled outpatient physical therapy to address the deficits listed in the problem list, provide pt/family education and to maximize pt's level of independence in the home and community environment.     Goals as follows:  Long Term GOALS:  In 6 weeks, pt. will:  1. Be independent with HEP and SX management   2. Demonstrate improvement in her right ankle dorsiflexion to +3 degrees  3. Demonstrate improvement in her right ankle plantarflexion strength by 1/2 grade  4. Demonstrate consistent wearing and compliance with proper shoe/insert to decrease stress on achilles tendon  5. LEFS improved to 20% limitation or less         Plan     Continue with established Plan of Care towards PT goals.    Therapist: Meena Ramirez, PT  3/27/2019

## 2019-04-03 ENCOUNTER — CLINICAL SUPPORT (OUTPATIENT)
Dept: REHABILITATION | Facility: HOSPITAL | Age: 69
End: 2019-04-03
Attending: PODIATRIST
Payer: MEDICARE

## 2019-04-03 DIAGNOSIS — R26.2 DIFFICULTY WALKING: ICD-10-CM

## 2019-04-03 DIAGNOSIS — M76.61 ACHILLES TENDINITIS OF RIGHT LOWER EXTREMITY: Primary | ICD-10-CM

## 2019-04-03 PROCEDURE — 97110 THERAPEUTIC EXERCISES: CPT | Mod: PN

## 2019-04-03 NOTE — PROGRESS NOTES
Physical Therapy Daily Note     Name: Kathi Baker [facility] Number: [id number]  Diagnosis:   Encounter Diagnoses   Name Primary?    Achilles tendinitis of right lower extremity Yes    Difficulty walking      Physician: Glenis Plaza DPM  Precautions: standard  Visit #: 4 of 12  PTA Visit #: 1  Time In: 2:00 PM  Time Out:  3:00 PM    Subjective     Pt reports: My ankle/foot are doing well. Kathi reports that she has had no reoccurrence on her achilles pain    Pain Scale: Kathi rates pain on a scale of 0-10 to be 0/10 currently.    Objective     Kathi received individual therapeutic exercises to develop strength and core stabilization for 35 minutes includin. Elliptical - Level 3  - 10 mins today   2. Achilles Stretch on wedge 3 x 1 min  3. Heel Raises - 3 way - 15 reps each   4. SLR x  with 2# - 10 x 2 reps each   5. Ball Squeezes x 3 mins   6. Bridges x 20   7. Side Lying Hip Abduction 2# 10 x 2  8. Prone - Alternate arm and leg x 10  9. Vigor Gym - Level 9 x 10 mins      Kathi received the following manual therapy techniques: Soft tissue Mobilization were applied to the: right achilles  for 10 minutes including:  IASTM to right achilles and calf mm - friction massage to medial/lateral aspects of achilles followed by passive and active stretching.      The patient received the following direct contact modalities after being cleared for contraindications:     The patient received the following supervised modalities after being cleared for contradictions:     Written Home Exercises Provided: How to use her Pro-Stretch - stated that she had one at home, but didn't know what to do with it.   Pt demo good understanding of the education provided. Kathi demonstrated good return demonstration of activities.     Education provided re:  Kathi verbalized good understanding of education provided.   No spiritual or educational barriers to learning  provided    Assessment     Patient tolerated treatment well. She did a much better job with all of her exercises today; Able to increase her time on the Elliptical from 2 to 10 mins today. This is a 68 y.o. female referred to outpatient physical therapy and presents with a medical diagnosis of achilles tendonitis  and demonstrates limitations as described in the problem list. Pt prognosis is Good. Pt will continue to benefit from skilled outpatient physical therapy to address the deficits listed in the problem list, provide pt/family education and to maximize pt's level of independence in the home and community environment.     Goals as follows:  Long Term GOALS:  In 6 weeks, pt. will:  1. Be independent with HEP and SX management   2. Demonstrate improvement in her right ankle dorsiflexion to +3 degrees  3. Demonstrate improvement in her right ankle plantarflexion strength by 1/2 grade  4. Demonstrate consistent wearing and compliance with proper shoe/insert to decrease stress on achilles tendon  5. LEFS improved to 20% limitation or less         Plan     Continue with established Plan of Care towards PT goals.    Therapist: Jonathan Favre, PTA  4/3/2019

## 2019-04-10 ENCOUNTER — CLINICAL SUPPORT (OUTPATIENT)
Dept: REHABILITATION | Facility: HOSPITAL | Age: 69
End: 2019-04-10
Attending: PODIATRIST
Payer: MEDICARE

## 2019-04-10 DIAGNOSIS — M76.61 ACHILLES TENDINITIS OF RIGHT LOWER EXTREMITY: Primary | ICD-10-CM

## 2019-04-10 PROCEDURE — 97110 THERAPEUTIC EXERCISES: CPT | Mod: PN

## 2019-04-10 NOTE — PROGRESS NOTES
Physical Therapy Daily Note     Name: Kathi Baker [facility] Number: [id number]  Diagnosis:   Encounter Diagnosis   Name Primary?    Achilles tendinitis of right lower extremity Yes     Physician: Glenis Plaza DPM  Precautions: standard  Visit #: 5 of 12  PTA Visit #: 2  Time In: 2:00 PM  Time Out:  3:00 PM    Subjective     Pt reports: My ankle/foot are doing well. Kathi reports that she has had no reoccurrence on her achilles pain    Pain Scale: Kathi rates pain on a scale of 0-10 to be 0/10 currently.    Objective     Kathi received individual therapeutic exercises to develop strength and core stabilization for 35 minutes includin. Elliptical - Level 3  - 10 mins today   2. Achilles Stretch on wedge 3 x 1 min  3. Heel Raises - 3 way - 15 reps each   4. SLR x  with 2# - 10 x 2 reps each   5. Ball Squeezes x 3 mins   6. Bridges x 20   7. Side Lying Hip Abduction 2# 10 x 2  8. Prone - Alternate arm and leg x 10  9. Vigor Gym - Level 9 x 10 mins      Kathi received the following manual therapy techniques: Soft tissue Mobilization were applied to the: right achilles  for 10 minutes including:  IASTM to right achilles and calf mm - friction massage to medial/lateral aspects of achilles followed by passive and active stretching.      The patient received the following direct contact modalities after being cleared for contraindications:     The patient received the following supervised modalities after being cleared for contradictions:     Written Home Exercises Provided: How to use her Pro-Stretch - stated that she had one at home, but didn't know what to do with it.   Pt demo good understanding of the education provided. Kathi demonstrated good return demonstration of activities.     Education provided re:  Kathi verbalized good understanding of education provided.   No spiritual or educational barriers to learning provided    Assessment     Patient  tolerated treatment well. She did a much better job with all of her exercises today; Able to increase her time on the Elliptical from 2 to 10 mins today. This is a 68 y.o. female referred to outpatient physical therapy and presents with a medical diagnosis of achilles tendonitis  and demonstrates limitations as described in the problem list. Pt prognosis is Good. Pt will continue to benefit from skilled outpatient physical therapy to address the deficits listed in the problem list, provide pt/family education and to maximize pt's level of independence in the home and community environment.     Goals as follows:  Long Term GOALS:  In 6 weeks, pt. will:  1. Be independent with HEP and SX management   2. Demonstrate improvement in her right ankle dorsiflexion to +3 degrees  3. Demonstrate improvement in her right ankle plantarflexion strength by 1/2 grade  4. Demonstrate consistent wearing and compliance with proper shoe/insert to decrease stress on achilles tendon  5. LEFS improved to 20% limitation or less         Plan     Continue with established Plan of Care towards PT goals.    Therapist: Jonathan Favre, PTA  4/10/2019

## 2019-04-17 ENCOUNTER — CLINICAL SUPPORT (OUTPATIENT)
Dept: REHABILITATION | Facility: HOSPITAL | Age: 69
End: 2019-04-17
Attending: PODIATRIST
Payer: MEDICARE

## 2019-04-17 DIAGNOSIS — M76.61 ACHILLES TENDINITIS OF RIGHT LOWER EXTREMITY: Primary | ICD-10-CM

## 2019-04-17 PROCEDURE — 97110 THERAPEUTIC EXERCISES: CPT | Mod: PN

## 2019-04-17 NOTE — PROGRESS NOTES
Physical Therapy Daily Note     Name: Kathi Baker [facility] Number: [id number]  Diagnosis:   Encounter Diagnosis   Name Primary?    Achilles tendinitis of right lower extremity Yes     Physician: Glenis Plaza DPM  Precautions: standard  Visit #: 6 of 12  PTA Visit #: 3  Time In: 9:25 AM  Time Out:  10:30 AM    Subjective     Pt reports: My ankle/foot are doing well; I am having a little pain in my left hip.   Pain Scale: Kathi rates pain on a scale of 0-10 to be 0 currently.    Objective     Kathi received individual therapeutic exercises to develop strength and core stabilization for 35 minutes includin. Elliptical - Level 3  - 10 mins today   2. Achilles Stretch on wedge 3 x 1 min  3. Heel Raises - 3 way - 15 reps each   4. SLR x  with 2# - 10 x 2 reps each   5. Ball Squeezes x 3 mins   6. Bridges x 20   7. Side Lying Hip Abduction 2# 10 x 2  8. Prone - Alternate arm and leg x 10  9. Vigor Gym - Level 9 x 10 mins    10. Nustep level 5 x 25 mins    DNP  Kathi received the following manual therapy techniques: Soft tissue Mobilization were applied to the: right achilles  for 10 minutes including:  IASTM to right achilles and calf mm - friction massage to medial/lateral aspects of achilles followed by passive and active stretching.      The patient received the following direct contact modalities after being cleared for contraindications:     The patient received the following supervised modalities after being cleared for contradictions:     Written Home Exercises Provided: How to use her Pro-Stretch - stated that she had one at home, but didn't know what to do with it.   Pt demo good understanding of the education provided. Kathi demonstrated good return demonstration of activities.     Education provided re:  Kathi verbalized good understanding of education provided.   No spiritual or educational barriers to learning provided    Assessment      Patient tolerated treatment well. She did a much better job with all of her exercises today; Able to increase her time on the Elliptical from 2 to 10 mins today. This is a 68 y.o. female referred to outpatient physical therapy and presents with a medical diagnosis of achilles tendonitis  and demonstrates limitations as described in the problem list. Pt prognosis is Good. Pt will continue to benefit from skilled outpatient physical therapy to address the deficits listed in the problem list, provide pt/family education and to maximize pt's level of independence in the home and community environment.     Goals as follows:  Long Term GOALS:  In 6 weeks, pt. will:  1. Be independent with HEP and SX management   2. Demonstrate improvement in her right ankle dorsiflexion to +3 degrees  3. Demonstrate improvement in her right ankle plantarflexion strength by 1/2 grade  4. Demonstrate consistent wearing and compliance with proper shoe/insert to decrease stress on achilles tendon  5. LEFS improved to 20% limitation or less         Plan     Continue with established Plan of Care towards PT goals.    Therapist: Jonathan Favre, PTA  4/17/2019

## 2019-04-24 ENCOUNTER — CLINICAL SUPPORT (OUTPATIENT)
Dept: REHABILITATION | Facility: HOSPITAL | Age: 69
End: 2019-04-24
Attending: PODIATRIST
Payer: MEDICARE

## 2019-04-24 DIAGNOSIS — M76.61 ACHILLES TENDINITIS OF RIGHT LOWER EXTREMITY: Primary | ICD-10-CM

## 2019-04-24 PROCEDURE — 97110 THERAPEUTIC EXERCISES: CPT | Mod: PN

## 2019-04-24 NOTE — PROGRESS NOTES
Physical Therapy Daily Note     Name: Kathi Baker [facility] Number: [id number]  Diagnosis:   Encounter Diagnosis   Name Primary?    Achilles tendinitis of right lower extremity Yes     Physician: Glenis Plaza DPM  Precautions: standard  Visit #:   PTA Visit #: 4  Time In: 2:05 PM  Time Out:  3:10 PM    Subjective     Pt reports: My ankle/foot are doing well; I am having a little pain in my low back   Pain Scale: Kathi rates pain on a scale of 0-10 to be 5 currently: low back.    Objective     Kathi received individual therapeutic exercises to develop strength and core stabilization for 45 minutes includin. Elliptical - Level 3  - 10 mins today   2. Achilles Stretch on wedge 3 x 1 min  3. Heel Raises - 3 way - 15 reps each   4. SLR x  with 2# - 10 x 2 reps each   5. Ball Squeezes x 3 mins   6. Bridges x 20   7. Side Lying Hip Abduction 2# 10 x 2  8. Prone - Alternate arm and leg x 10  9. Vigor Gym - Level 9 x 10 mins    10. Nustep level 5 x 20 mins    DNP  Kathi received the following manual therapy techniques: Soft tissue Mobilization were applied to the: right achilles  for 10 minutes including:  IASTM to right achilles and calf mm - friction massage to medial/lateral aspects of achilles followed by passive and active stretching.      The patient received the following direct contact modalities after being cleared for contraindications:     The patient received the following supervised modalities after being cleared for contradictions:     Written Home Exercises Provided: How to use her Pro-Stretch - stated that she had one at home, but didn't know what to do with it.   Pt demo good understanding of the education provided. Kathi demonstrated good return demonstration of activities.     Education provided re:  Kathi verbalized good understanding of education provided.   No spiritual or educational barriers to learning provided    Assessment      Patient tolerated treatment well. She did a much better job with all of her exercises today; Able to increase her time on the Elliptical from 2 to 10 mins today. This is a 68 y.o. female referred to outpatient physical therapy and presents with a medical diagnosis of achilles tendonitis  and demonstrates limitations as described in the problem list. Pt prognosis is Good. Pt will continue to benefit from skilled outpatient physical therapy to address the deficits listed in the problem list, provide pt/family education and to maximize pt's level of independence in the home and community environment.     Goals as follows:  Long Term GOALS:  In 6 weeks, pt. will:  1. Be independent with HEP and SX management   2. Demonstrate improvement in her right ankle dorsiflexion to +3 degrees  3. Demonstrate improvement in her right ankle plantarflexion strength by 1/2 grade  4. Demonstrate consistent wearing and compliance with proper shoe/insert to decrease stress on achilles tendon  5. LEFS improved to 20% limitation or less         Plan     Continue with established Plan of Care towards PT goals.    Therapist: Jonathan Favre, PTA  4/24/2019

## 2019-05-01 ENCOUNTER — CLINICAL SUPPORT (OUTPATIENT)
Dept: REHABILITATION | Facility: HOSPITAL | Age: 69
End: 2019-05-01
Attending: PODIATRIST
Payer: MEDICARE

## 2019-05-01 DIAGNOSIS — M76.61 ACHILLES TENDINITIS OF RIGHT LOWER EXTREMITY: Primary | ICD-10-CM

## 2019-05-01 PROCEDURE — 97110 THERAPEUTIC EXERCISES: CPT | Mod: PN

## 2019-05-01 NOTE — PROGRESS NOTES
Physical Therapy Daily Note     Name: Kathi Baker [facility] Number: [id number]  Diagnosis:   Encounter Diagnosis   Name Primary?    Achilles tendinitis of right lower extremity Yes     Physician: Glenis Plaza DPM  Precautions: standard  Visit #: 8   PTA Visit #: 5  Time In: 2:00 PM  Time Out:  3:10 PM    Subjective     Pt reports: My ankle/foot are doing well; I am having a little pain in my low back   Pain Scale: Kathi rates pain on a scale of 0-10 to be 5 currently: low back.    Objective     Kathi received individual therapeutic exercises to develop strength and core stabilization for 45 minutes includin. Elliptical - Level 3  - 10 mins today   2. Achilles Stretch on wedge 3 x 1 min  3. Heel Raises - 3 way - 15 reps each   4. SLR x  with 2# - 10 x 2 reps each   5. Ball Squeezes x 3 mins   6. Bridges x 20   7. Side Lying Hip Abduction 2# 10 x 2  8. Prone - Alternate arm and leg x 10  9. Vigor Gym - Level 9 x 10 mins    10. Nustep level 5 x 20 mins  11. DKC with SB x 3 mins    DNP  Kathi received the following manual therapy techniques: Soft tissue Mobilization were applied to the: right achilles  for 10 minutes including:  IASTM to right achilles and calf mm - friction massage to medial/lateral aspects of achilles followed by passive and active stretching.      The patient received the following direct contact modalities after being cleared for contraindications:     The patient received the following supervised modalities after being cleared for contradictions:     Written Home Exercises Provided: How to use her Pro-Stretch - stated that she had one at home, but didn't know what to do with it.   Pt demo good understanding of the education provided. Kathi demonstrated good return demonstration of activities.     Education provided re:  Kathi verbalized good understanding of education provided.   No spiritual or educational barriers to  learning provided    Assessment     Patient tolerated treatment well; still needs vc's for exercise technique.  This is a 68 y.o. female referred to outpatient physical therapy and presents with a medical diagnosis of achilles tendonitis  and demonstrates limitations as described in the problem list. Pt prognosis is Good. Pt will continue to benefit from skilled outpatient physical therapy to address the deficits listed in the problem list, provide pt/family education and to maximize pt's level of independence in the home and community environment.     Goals as follows:  Long Term GOALS:  In 6 weeks, pt. will:  1. Be independent with HEP and SX management   2. Demonstrate improvement in her right ankle dorsiflexion to +3 degrees  3. Demonstrate improvement in her right ankle plantarflexion strength by 1/2 grade  4. Demonstrate consistent wearing and compliance with proper shoe/insert to decrease stress on achilles tendon  5. LEFS improved to 20% limitation or less         Plan     Continue with established Plan of Care towards PT goals.    Therapist: Jonathan Favre, PTA  5/1/2019

## 2019-05-15 ENCOUNTER — CLINICAL SUPPORT (OUTPATIENT)
Dept: REHABILITATION | Facility: HOSPITAL | Age: 69
End: 2019-05-15
Attending: PODIATRIST
Payer: MEDICARE

## 2019-05-15 DIAGNOSIS — M76.61 ACHILLES TENDINITIS OF RIGHT LOWER EXTREMITY: Primary | ICD-10-CM

## 2019-05-15 PROCEDURE — 97110 THERAPEUTIC EXERCISES: CPT | Mod: PN

## 2019-05-15 NOTE — PROGRESS NOTES
Physical Therapy Daily Note     Name: Kathi Baker [facility] Number: [id number]  Diagnosis:   Encounter Diagnosis   Name Primary?    Achilles tendinitis of right lower extremity Yes     Physician: Glenis Plaza DPM  Precautions: standard  Visit #:   PTA Visit #: 6  Time In: 2:00 PM  Time Out:  3:10 PM    Subjective     Pt reports: My ankle/foot are doing well; I am having a little pain in my low back   Pain Scale: Kathi rates pain on a scale of 0-10 to be 4 currently.     Objective     Kathi received individual therapeutic exercises to develop strength and core stabilization for 45 minutes includin. Elliptical - Level 3  - 14 mins today   2. Achilles Stretch on wedge 3 x 1 min  3. Heel Raises - 3 way - 15 reps each   4. SLR x  with 2# - 10 x 2 reps each   5. Ball Squeezes x 3 mins   6. Bridges x 20   7. Side Lying Hip Abduction 2# 10 x 2  8. Prone - Alternate arm and leg x 10  9. Vigor Gym - Level 9 x 10 mins    10. Nustep level 5 x 20 mins  11. DKC with SB x 3 mins    DNP  Kathi received the following manual therapy techniques: Soft tissue Mobilization were applied to the: right achilles  for 10 minutes including:  IASTM to right achilles and calf mm - friction massage to medial/lateral aspects of achilles followed by passive and active stretching.      The patient received the following direct contact modalities after being cleared for contraindications:     The patient received the following supervised modalities after being cleared for contradictions:     Written Home Exercises Provided: How to use her Pro-Stretch - stated that she had one at home, but didn't know what to do with it.   Pt demo good understanding of the education provided. Kathi demonstrated good return demonstration of activities.     Education provided re:  Kathi verbalized good understanding of education provided.   No spiritual or educational barriers to learning  provided    Assessment     Patient tolerated treatment well; still needs vc's for exercise technique.  This is a 68 y.o. female referred to outpatient physical therapy and presents with a medical diagnosis of achilles tendonitis  and demonstrates limitations as described in the problem list. Pt prognosis is Good. Pt will continue to benefit from skilled outpatient physical therapy to address the deficits listed in the problem list, provide pt/family education and to maximize pt's level of independence in the home and community environment.     Goals as follows:  Long Term GOALS:  In 6 weeks, pt. will:  1. Be independent with HEP and SX management   2. Demonstrate improvement in her right ankle dorsiflexion to +3 degrees  3. Demonstrate improvement in her right ankle plantarflexion strength by 1/2 grade  4. Demonstrate consistent wearing and compliance with proper shoe/insert to decrease stress on achilles tendon  5. LEFS improved to 20% limitation or less         Plan     Continue with established Plan of Care towards PT goals.    Therapist: Jonathan Favre, PTA  5/15/2019

## 2019-05-22 ENCOUNTER — CLINICAL SUPPORT (OUTPATIENT)
Dept: REHABILITATION | Facility: HOSPITAL | Age: 69
End: 2019-05-22
Attending: PODIATRIST
Payer: MEDICARE

## 2019-05-22 DIAGNOSIS — M76.61 ACHILLES TENDINITIS OF RIGHT LOWER EXTREMITY: Primary | ICD-10-CM

## 2019-05-22 PROCEDURE — 97113 AQUATIC THERAPY/EXERCISES: CPT | Mod: PN

## 2019-05-22 NOTE — PROGRESS NOTES
Physical Therapy Daily Note     Name: Kathi Baker [facility] Number: [id number]  Diagnosis:   Encounter Diagnosis   Name Primary?    Achilles tendinitis of right lower extremity Yes     Physician: Glenis Plaza DPM  Precautions: standard  Visit #: 10 of 12  PTA Visit #: 1  Time In: 2:30 PM  Time Out:  3:30 PM    Subjective     Pt reports: No new c/o's.  Pain Scale: Kathi rates pain on a scale of 0-10 to be 4 currently.     Objective     Kathi received individual therapeutic exercises to develop strength and core stabilization for 45 minutes includin. Elliptical - Level 3  - 14 mins today   2. Achilles Stretch on wedge 3 x 1 min  3. Heel Raises - 3 way - 15 reps each   4. SLR x  with 2# - 10 x 2 reps each   5. Ball Squeezes x 3 mins   6. Bridges x 20   7. Side Lying Hip Abduction 2# 10 x 2  8. Prone - Alternate arm and leg x 10  9. Vigor Gym - Level 9 x 10 mins    10. Nustep level 5 x 20 mins  11. DKC with SB x 3 mins    Aquatic Exercise 1:1 x 30 minutes  Forward Walking x 10 min  Side Steps x 5 min  Backward Walking x 5 min  SLR x 20  Hip Abd x 20  Hip Ext x 20   Marching x 20  Squats x 20   Heel Raises x 20   Cycling x 5 min      DNP  Kathi received the following manual therapy techniques: Soft tissue Mobilization were applied to the: right achilles  for 10 minutes including:  IASTM to right achilles and calf mm - friction massage to medial/lateral aspects of achilles followed by passive and active stretching.      The patient received the following direct contact modalities after being cleared for contraindications:     The patient received the following supervised modalities after being cleared for contradictions:     Written Home Exercises Provided: How to use her Pro-Stretch - stated that she had one at home, but didn't know what to do with it.   Pt demo good understanding of the education provided. Kathi demonstrated good return demonstration  of activities.     Education provided re:  Kathi verbalized good understanding of education provided.   No spiritual or educational barriers to learning provided    Assessment     Patient tolerated treatment well; still needs vc's for exercise technique.  This is a 68 y.o. female referred to outpatient physical therapy and presents with a medical diagnosis of achilles tendonitis  and demonstrates limitations as described in the problem list. Pt prognosis is Good. Pt will continue to benefit from skilled outpatient physical therapy to address the deficits listed in the problem list, provide pt/family education and to maximize pt's level of independence in the home and community environment.     Goals as follows:  Long Term GOALS:  In 6 weeks, pt. will:  1. Be independent with HEP and SX management   2. Demonstrate improvement in her right ankle dorsiflexion to +3 degrees  3. Demonstrate improvement in her right ankle plantarflexion strength by 1/2 grade  4. Demonstrate consistent wearing and compliance with proper shoe/insert to decrease stress on achilles tendon  5. LEFS improved to 20% limitation or less         Plan     Continue with established Plan of Care towards PT goals.    Therapist: Jonathan Favre, PTA  5/22/2019

## 2019-06-18 ENCOUNTER — OFFICE VISIT (OUTPATIENT)
Dept: PODIATRY | Facility: CLINIC | Age: 69
End: 2019-06-18
Payer: MEDICARE

## 2019-06-18 VITALS
HEART RATE: 83 BPM | SYSTOLIC BLOOD PRESSURE: 125 MMHG | OXYGEN SATURATION: 97 % | DIASTOLIC BLOOD PRESSURE: 71 MMHG | TEMPERATURE: 98 F | WEIGHT: 160 LBS | RESPIRATION RATE: 20 BRPM | BODY MASS INDEX: 25.11 KG/M2 | HEIGHT: 67 IN

## 2019-06-18 DIAGNOSIS — M76.61 ACHILLES TENDINITIS OF RIGHT LOWER EXTREMITY: Primary | ICD-10-CM

## 2019-06-18 DIAGNOSIS — M72.2 PLANTAR FASCIITIS: ICD-10-CM

## 2019-06-18 DIAGNOSIS — M77.31 CALCANEAL SPUR OF FOOT, RIGHT: ICD-10-CM

## 2019-06-18 DIAGNOSIS — G57.51 TARSAL TUNNEL SYNDROME OF RIGHT SIDE: ICD-10-CM

## 2019-06-18 PROCEDURE — 3074F PR MOST RECENT SYSTOLIC BLOOD PRESSURE < 130 MM HG: ICD-10-PCS | Mod: CPTII,S$GLB,, | Performed by: PODIATRIST

## 2019-06-18 PROCEDURE — 20550 NJX 1 TENDON SHEATH/LIGAMENT: CPT | Mod: RT,S$GLB,, | Performed by: PODIATRIST

## 2019-06-18 PROCEDURE — 1101F PT FALLS ASSESS-DOCD LE1/YR: CPT | Mod: CPTII,S$GLB,, | Performed by: PODIATRIST

## 2019-06-18 PROCEDURE — 99214 PR OFFICE/OUTPT VISIT, EST, LEVL IV, 30-39 MIN: ICD-10-PCS | Mod: 25,S$GLB,, | Performed by: PODIATRIST

## 2019-06-18 PROCEDURE — 1101F PR PT FALLS ASSESS DOC 0-1 FALLS W/OUT INJ PAST YR: ICD-10-PCS | Mod: CPTII,S$GLB,, | Performed by: PODIATRIST

## 2019-06-18 PROCEDURE — 3078F DIAST BP <80 MM HG: CPT | Mod: CPTII,S$GLB,, | Performed by: PODIATRIST

## 2019-06-18 PROCEDURE — 99999 PR PBB SHADOW E&M-EST. PATIENT-LVL III: CPT | Mod: PBBFAC,,, | Performed by: PODIATRIST

## 2019-06-18 PROCEDURE — 3078F PR MOST RECENT DIASTOLIC BLOOD PRESSURE < 80 MM HG: ICD-10-PCS | Mod: CPTII,S$GLB,, | Performed by: PODIATRIST

## 2019-06-18 PROCEDURE — 20550 PR INJECT TENDON SHEATH/LIGAMENT: ICD-10-PCS | Mod: RT,S$GLB,, | Performed by: PODIATRIST

## 2019-06-18 PROCEDURE — 3074F SYST BP LT 130 MM HG: CPT | Mod: CPTII,S$GLB,, | Performed by: PODIATRIST

## 2019-06-18 PROCEDURE — 99999 PR PBB SHADOW E&M-EST. PATIENT-LVL III: ICD-10-PCS | Mod: PBBFAC,,, | Performed by: PODIATRIST

## 2019-06-18 PROCEDURE — 99214 OFFICE O/P EST MOD 30 MIN: CPT | Mod: 25,S$GLB,, | Performed by: PODIATRIST

## 2019-06-18 RX ORDER — LIDOCAINE HYDROCHLORIDE 10 MG/ML
1 INJECTION INFILTRATION; PERINEURAL
Status: COMPLETED | OUTPATIENT
Start: 2019-06-18 | End: 2019-06-18

## 2019-06-18 RX ORDER — METHYLPREDNISOLONE ACETATE 80 MG/ML
40 INJECTION, SUSPENSION INTRA-ARTICULAR; INTRALESIONAL; INTRAMUSCULAR; SOFT TISSUE ONCE
Status: COMPLETED | OUTPATIENT
Start: 2019-06-18 | End: 2019-06-18

## 2019-06-18 RX ORDER — DEXAMETHASONE SODIUM PHOSPHATE 4 MG/ML
2 INJECTION, SOLUTION INTRA-ARTICULAR; INTRALESIONAL; INTRAMUSCULAR; INTRAVENOUS; SOFT TISSUE ONCE
Status: COMPLETED | OUTPATIENT
Start: 2019-06-18 | End: 2019-06-18

## 2019-06-18 RX ADMIN — DEXAMETHASONE SODIUM PHOSPHATE 2 MG: 4 INJECTION, SOLUTION INTRA-ARTICULAR; INTRALESIONAL; INTRAMUSCULAR; INTRAVENOUS; SOFT TISSUE at 03:06

## 2019-06-18 RX ADMIN — LIDOCAINE HYDROCHLORIDE 1 ML: 10 INJECTION INFILTRATION; PERINEURAL at 03:06

## 2019-06-18 RX ADMIN — METHYLPREDNISOLONE ACETATE 40 MG: 80 INJECTION, SUSPENSION INTRA-ARTICULAR; INTRALESIONAL; INTRAMUSCULAR; SOFT TISSUE at 03:06

## 2019-06-18 NOTE — LETTER
June 21, 2019      Clare Pan NP  [address]  [place] [address]           [facility] - [facility]  [address] [place] [address] [place] [address]  Phone: [phone]  Fax: [phone]          Patient: Kathi Head   MR Number: [id number]   YOB: 1950   Date of Visit: 6/18/2019       Dear Clare Pan:    Thank you for referring Kathi Head to me for evaluation. Attached you will find relevant portions of my assessment and plan of care.    If you have questions, please do not hesitate to call me. I look forward to following Kathi Head along with you.    Sincerely,    Glenis Plaza, JEREMÍAS    Enclosure  CC:  No Recipients    If you would like to receive this communication electronically, please contact [email or web address] or [phone] to request more information on [employer] Link access.    For providers and/or their staff who would like to refer a patient to [facility], please contact us through our one-stop-shop provider referral line, [facility], at [phone].    If you feel you have received this communication in error or would no longer like to receive these types of communications, please e-mail [email or web address]

## 2019-06-21 NOTE — PROGRESS NOTES
Subjective:      Patient ID: Kathi Head is a 68 y.o. female.    Chief Complaint: Follow-up  Patient presents for follow-up flare up chronic Achilles tendinitis.  Patient reports injection administered in February has done very well until just a few weeks ago when pain started again and has slowly progressed.    Has been going to physical therapy. She relates overall improvement, not having throbbing pain when at rest, but escalate with activity.  Is using Voltaren Gel, taking 300 mg gabapentin at bedtime which helps.  Patient inquires if injection can be administered today.  She is trying to avoid surgery and will continue to avoid it is long as possible.  She is however undergoing sinus surgery by Dr. Oliveira next week.      ROS     Constitutional    Pleasant, well-nourished, no distress, well oriented    Eyes         GLASSES    Cardiovascular          No chest pain, no shortness of breath    Respiratory           No cough, no congestion     Gastrointestinal          Severe GERD    Musculoskeletal         YES muscle aches, YES arthralgias/joint pain, no swelling in the extremities    Neurologic         Reports previous history of neuropathy upper legs           Objective:      Physical Exam  Vascular         Arterial Pulses Right: posterior tibialis 2/4, dorsalis pedis 2/4, normal CFT   No lower extremity edema   Pedal skin temperature and color are normal    Integumentary       Mild bursitis posterior right heel         Neurological   Gross sensation intact, minimal discomfort involving tarsal tunnel right     Musculoskeletal   Muscle Strength/Testing and Tone:  Intact, normal tone  Joints, Bones, and Muscles: Ankle joint equinus R>L  Increased pain medial achilles tendon insertion right   Minimal plantar fascial pain right     X-Ray Foot Complete Right   Order: [id number]   Status:  Final result   Visible to patient:  No (Not Released) Next appt:  None Dx:  Calcaneal spur of foot, right   Details      Reading Physician Reading Date Result Priority   Reji Galvez MD 8/29/2018       Narrative     EXAMINATION:  XR FOOT COMPLETE 3 VIEW RIGHT    CLINICAL HISTORY:  . Calcaneal spur, right foot    TECHNIQUE:  AP, lateral, and oblique views of the right foot were performed.    COMPARISON:  None    FINDINGS:  No acute fracture or dislocation.  No significant soft tissue swelling.    The joint spaces are preserved.  The tarsal bones are intact with mild changes of degenerative osteoarthrosis.  Normal tarsometatarsal alignment.    Small plantar calcaneal spur.  Prominent enthesophytes formation at the insertion of the Achilles tendon.      Impression       1. Mild changes of degenerative osteoarthrosis.  2. Small calcaneal spur.                 Assessment:       Encounter Diagnoses   Name Primary?    Achilles tendinitis of right lower extremity Yes    Tarsal tunnel syndrome of right side     Plantar fasciitis     Calcaneal spur of foot, right          Plan:       Kathi was seen today for follow-up.    Diagnoses and all orders for this visit:    Achilles tendinitis of right lower extremity    Tarsal tunnel syndrome of right side    Plantar fasciitis    Calcaneal spur of foot, right    Other orders  -     dexamethasone injection 2 mg  -     methylPREDNISolone acetate injection 40 mg  -     lidocaine HCL 10 mg/ml (1%) injection 1 mL      Reviewed xrays, large retro calcaneal spur   Reviewed Achilles tendinitis and plantar fasciitis.  Discussed calcaneal spur.  Reviewed cortisone/ steroid injection, potential side effects and length of time jet action may be beneficial.  Advised patient this can be administered today, however in the future would recommend 6 months or more.  Advised patient she needs to continue with appropriate shoes, even indoors, no flat shoes or walking barefoot.  Instructed patient to continue ice /cool therapy, light stretching should be started in 3-4 days.  Reviewed continued gabapentin 1  at bedtime  Continue Voltaren gel 3 times daily.  Patient was in understanding and agreement with treatment plan.  Patient was administered injection medial right Achilles tendon with above medications.  Patient tolerated well.  I counseled the patient on her conditions, their implications and medical management.  Instructed patient to contact the office with any changes, questions, concerns, worsening of symptoms, if not significant improvement in 1-2 weeks are not resolved in 2-3 weeks.   Patient verbalized understanding.   Total face to face time, exam, assessment, treatment, discussion, documentation 25 minutes, more than half this time spent on consultation and coordination of care.     Follow up as needed    This note was created using [employer] voice recognition software that occasionally misinterpreted phrases or words.

## 2019-07-03 ENCOUNTER — TELEPHONE (OUTPATIENT)
Dept: PODIATRY | Facility: CLINIC | Age: 69
End: 2019-07-03

## 2019-07-03 NOTE — TELEPHONE ENCOUNTER
----- Message from Jojo Albrgiht sent at 7/3/2019  1:31 PM CDT -----  Type: Needs Medical Advice    Who Called:  patientSymptoms (please be specific):  Rt foot pain  How long has patient had these symptoms:  zen  Pharmacy name and phone #:  zen  Best Call Back Number: [phone]  Additional Information: patient received a shot in her right foot yesterday and is not helping/she is asking to speak with Dr Plaza concerning this

## 2019-07-08 ENCOUNTER — TELEPHONE (OUTPATIENT)
Dept: PODIATRY | Facility: CLINIC | Age: 69
End: 2019-07-08

## 2019-07-08 NOTE — TELEPHONE ENCOUNTER
Pt is requesting another injection in foot. Pt states she does not think the last one last as long as before. She is requesting to come in this week. Available appt on Thursday if she will schedule if she can get another injection. Pt last one was on 6/18. Please advise.

## 2019-07-08 NOTE — TELEPHONE ENCOUNTER
----- Message from Melissa Whittaker sent at 7/8/2019  9:36 AM CDT -----    Type:  Sooner Apoointment Request    Caller is requesting a sooner appointment.  Caller declined first available appointment listed below.  Caller will not accept being placed on the waitlist and is requesting a message be sent to doctor.    Name of Caller:  pt  When is the first available appointment?  nurse  Symptoms: r  Foot  Heel   Best Call Back Number:  [phone]  Additional Information:  Calling  About  Having  Pain and  Asking  If its to soon  To  Get another injection

## 2019-07-09 NOTE — TELEPHONE ENCOUNTER
It is too soon for another achilles tendon injection. Typically it's 4-6 months between injections. We can consider another injection in 2 months, around 8/18. Thanks

## 2019-08-20 DIAGNOSIS — Z12.39 SCREENING BREAST EXAMINATION: Primary | ICD-10-CM

## 2019-08-27 ENCOUNTER — HOSPITAL ENCOUNTER (OUTPATIENT)
Dept: RADIOLOGY | Facility: HOSPITAL | Age: 69
Discharge: HOME | End: 2019-08-27
Attending: NURSE PRACTITIONER
Payer: MEDICARE

## 2019-08-27 VITALS — BODY MASS INDEX: 25.11 KG/M2 | WEIGHT: 160 LBS | HEIGHT: 67 IN

## 2019-08-27 DIAGNOSIS — Z12.39 SCREENING BREAST EXAMINATION: ICD-10-CM

## 2019-08-27 PROCEDURE — 77063 BREAST TOMOSYNTHESIS BI: CPT | Mod: 26,,, | Performed by: RADIOLOGY

## 2019-08-27 PROCEDURE — 77063 MAMMO DIGITAL SCREENING BILAT WITH TOMOSYNTHESIS_CAD: ICD-10-PCS | Mod: 26,,, | Performed by: RADIOLOGY

## 2019-08-27 PROCEDURE — 77067 SCR MAMMO BI INCL CAD: CPT | Mod: TC

## 2019-08-27 PROCEDURE — 77067 MAMMO DIGITAL SCREENING BILAT WITH TOMOSYNTHESIS_CAD: ICD-10-PCS | Mod: 26,,, | Performed by: RADIOLOGY

## 2019-08-27 PROCEDURE — 77067 SCR MAMMO BI INCL CAD: CPT | Mod: 26,,, | Performed by: RADIOLOGY

## 2019-08-29 ENCOUNTER — OFFICE VISIT (OUTPATIENT)
Dept: PODIATRY | Facility: CLINIC | Age: 69
End: 2019-08-29
Payer: MEDICARE

## 2019-08-29 ENCOUNTER — HOSPITAL ENCOUNTER (OUTPATIENT)
Dept: RADIOLOGY | Facility: HOSPITAL | Age: 69
Discharge: HOME | End: 2019-08-29
Attending: PODIATRIST
Payer: MEDICARE

## 2019-08-29 VITALS
RESPIRATION RATE: 16 BRPM | HEART RATE: 69 BPM | OXYGEN SATURATION: 98 % | SYSTOLIC BLOOD PRESSURE: 112 MMHG | BODY MASS INDEX: 25.98 KG/M2 | HEIGHT: 67 IN | WEIGHT: 165.5 LBS | DIASTOLIC BLOOD PRESSURE: 71 MMHG | TEMPERATURE: 99 F

## 2019-08-29 DIAGNOSIS — M77.31 HEEL SPUR, RIGHT: ICD-10-CM

## 2019-08-29 DIAGNOSIS — M76.61 ACHILLES TENDINITIS OF RIGHT LOWER EXTREMITY: ICD-10-CM

## 2019-08-29 DIAGNOSIS — S86.011D PARTIAL TEAR OF ACHILLES TENDON, RIGHT, SUBSEQUENT ENCOUNTER: Primary | ICD-10-CM

## 2019-08-29 DIAGNOSIS — M76.61 RIGHT ACHILLES BURSITIS: ICD-10-CM

## 2019-08-29 DIAGNOSIS — G57.91 NEURITIS OF RIGHT FOOT: ICD-10-CM

## 2019-08-29 DIAGNOSIS — K21.00 GASTROESOPHAGEAL REFLUX DISEASE WITH ESOPHAGITIS: Chronic | ICD-10-CM

## 2019-08-29 DIAGNOSIS — G57.51 TARSAL TUNNEL SYNDROME OF RIGHT SIDE: ICD-10-CM

## 2019-08-29 PROCEDURE — 3078F PR MOST RECENT DIASTOLIC BLOOD PRESSURE < 80 MM HG: ICD-10-PCS | Mod: CPTII,S$GLB,, | Performed by: PODIATRIST

## 2019-08-29 PROCEDURE — 3078F DIAST BP <80 MM HG: CPT | Mod: CPTII,S$GLB,, | Performed by: PODIATRIST

## 2019-08-29 PROCEDURE — 1101F PT FALLS ASSESS-DOCD LE1/YR: CPT | Mod: CPTII,S$GLB,, | Performed by: PODIATRIST

## 2019-08-29 PROCEDURE — 99215 OFFICE O/P EST HI 40 MIN: CPT | Mod: 25,S$GLB,, | Performed by: PODIATRIST

## 2019-08-29 PROCEDURE — 96372 PR INJECTION,THERAP/PROPH/DIAG2ST, IM OR SUBCUT: ICD-10-PCS | Mod: S$GLB,,, | Performed by: PODIATRIST

## 2019-08-29 PROCEDURE — 73630 X-RAY EXAM OF FOOT: CPT | Mod: TC,FY,RT

## 2019-08-29 PROCEDURE — 99215 PR OFFICE/OUTPT VISIT, EST, LEVL V, 40-54 MIN: ICD-10-PCS | Mod: 25,S$GLB,, | Performed by: PODIATRIST

## 2019-08-29 PROCEDURE — 3074F SYST BP LT 130 MM HG: CPT | Mod: CPTII,S$GLB,, | Performed by: PODIATRIST

## 2019-08-29 PROCEDURE — 99999 PR PBB SHADOW E&M-EST. PATIENT-LVL IV: CPT | Mod: PBBFAC,,, | Performed by: PODIATRIST

## 2019-08-29 PROCEDURE — 1101F PR PT FALLS ASSESS DOC 0-1 FALLS W/OUT INJ PAST YR: ICD-10-PCS | Mod: CPTII,S$GLB,, | Performed by: PODIATRIST

## 2019-08-29 PROCEDURE — 73630 XR FOOT COMPLETE 3 VIEW RIGHT: ICD-10-PCS | Mod: 26,RT,, | Performed by: RADIOLOGY

## 2019-08-29 PROCEDURE — 73630 X-RAY EXAM OF FOOT: CPT | Mod: 26,RT,, | Performed by: RADIOLOGY

## 2019-08-29 PROCEDURE — 99999 PR PBB SHADOW E&M-EST. PATIENT-LVL IV: ICD-10-PCS | Mod: PBBFAC,,, | Performed by: PODIATRIST

## 2019-08-29 PROCEDURE — 3074F PR MOST RECENT SYSTOLIC BLOOD PRESSURE < 130 MM HG: ICD-10-PCS | Mod: CPTII,S$GLB,, | Performed by: PODIATRIST

## 2019-08-29 PROCEDURE — 96372 THER/PROPH/DIAG INJ SC/IM: CPT | Mod: S$GLB,,, | Performed by: PODIATRIST

## 2019-08-29 RX ORDER — BETAMETHASONE SODIUM PHOSPHATE AND BETAMETHASONE ACETATE 3; 3 MG/ML; MG/ML
18 INJECTION, SUSPENSION INTRA-ARTICULAR; INTRALESIONAL; INTRAMUSCULAR; SOFT TISSUE
Status: COMPLETED | OUTPATIENT
Start: 2019-08-29 | End: 2019-08-29

## 2019-08-29 RX ADMIN — BETAMETHASONE SODIUM PHOSPHATE AND BETAMETHASONE ACETATE 18 MG: 3; 3 INJECTION, SUSPENSION INTRA-ARTICULAR; INTRALESIONAL; INTRAMUSCULAR; SOFT TISSUE at 09:08

## 2019-08-29 NOTE — LETTER
August 31, 2019      Clare Pan NP  [address]  [place] [address]           [facility] - Podiatry/Wound Care  [address] [place] [address] [place] [address]  Phone: [phone]  Fax: [phone]          Patient: Kathi Head   MR Number: [id number]   YOB: 1950   Date of Visit: 8/29/2019       Dear Clare Pan:    Thank you for referring Kathi Head to me for evaluation. Attached you will find relevant portions of my assessment and plan of care.    If you have questions, please do not hesitate to call me. I look forward to following Kathi Head along with you.    Sincerely,    Glenis Plaza, JEREMÍAS    Enclosure  CC:  No Recipients    If you would like to receive this communication electronically, please contact [email or web address] or [phone] to request more information on [employer] Link access.    For providers and/or their staff who would like to refer a patient to [facility], please contact us through our one-stop-shop provider referral line, [facility], at [phone].    If you feel you have received this communication in error or would no longer like to receive these types of communications, please e-mail [email or web address]

## 2019-08-31 NOTE — PROGRESS NOTES
Subjective:      Patient ID: Kathi Head is a 68 y.o. female.    Chief Complaint: Foot Pain (R foot)  Patient presents  with complaint of continued pain back of the right heel, increased pain burning, difficulty sleeping.  Patient reports no relief at all from cortisone injection done over 2 months ago.  She has continued to try to wear comfortable shoes with no pressure on the back, not very active due to pain  On the back of the right heel.  She relates more symptoms of neuropathy in her upper legs and has discussed this with her PCP, not sure taking gabapentin at night time has been much help.  Patient reports she has had nasal surgery since last visit, this was post to help symptoms of drainage, reflex and stomach issues which she has been having for a long time and unfortunately no improvement.  Pain 8/10      ROS  Constitutional    Pleasant, well-nourished, no distress, well oriented    Eyes         GLASSES    Cardiovascular          No chest pain, no shortness of breath    Respiratory           No cough, no congestion     Gastrointestinal          Severe GERD    Musculoskeletal         YES muscle aches, YES arthralgias/joint pain, no swelling in the extremities    Neurologic         Reports previous history of neuropathy upper legs           Objective:      Physical Exam   Cardiovascular:   Pulses:       Dorsalis pedis pulses are 2+ on the right side, and 2+ on the left side.        Posterior tibial pulses are 2+ on the right side, and 2+ on the left side.   Musculoskeletal:        Right foot: There is decreased range of motion.        Left foot: There is decreased range of motion.   Feet:   Right Foot:   Protective Sensation: 4 sites tested. 4 sites sensed.   Skin Integrity: Negative for erythema.   Left Foot:   Protective Sensation: 4 sites tested. 4 sites sensed.   Skin Integrity: Negative for erythema.   Vascular         Normal CFT bilateral    No lower extremity edema   Pedal skin temperature  and color are normal    Integumentary       Chronic mild bursitis posterior right heel         Neurological   Gross sensation intact, increased tarsal tunnel pain, positive paresthesias    Musculoskeletal   Muscle Strength/Testing and Tone:  Intact, normal tone  Joints, Bones, and Muscles: Ankle joint equinus R>L  Pain achilles tendon insertion right with bursitis  Minimal plantar fascial pain right        X-Ray Foot Complete Right   Details     Reading Physician Reading Date Result Priority   Reji Galvez MD 8/29/2019 Routine      Narrative     EXAMINATION:  XR FOOT COMPLETE 3 VIEW RIGHT    CLINICAL HISTORY:  . Calcaneal spur, right foot    TECHNIQUE:  AP, lateral, and oblique views of the right foot were performed.    COMPARISON:  08/29/2018.    FINDINGS:  No acute fracture or dislocation.  No significant soft tissue swelling.    Mild degenerative osteoarthrosis of the 1st MTP joint and the IP joints of the digits.  Remaining joint spaces are preserved.  The tarsal bones are intact.  Small os peroneum.  Normal tarsometatarsal alignment.    Prominent dorsal and plantar calcaneal spurs.      Impression       1. Mild degenerative osteoarthrosis.  2. Calcaneal spurs.      Electronically signed by: Reji Galvez                   Assessment:       Encounter Diagnoses   Name Primary?    Partial tear of Achilles tendon, right, subsequent encounter Yes    Achilles tendinitis of right lower extremity     Heel spur, right     Right Achilles bursitis     Gastroesophageal reflux disease with esophagitis     Neuritis of right foot     Tarsal tunnel syndrome of right side          Plan:       Kathi was seen today for foot pain.    Diagnoses and all orders for this visit:    Partial tear of Achilles tendon, right, subsequent encounter  -     MRI Foot (Hindfoot) Right Without Contrast; Future    Achilles tendinitis of right lower extremity  -     MRI Foot (Hindfoot) Right Without Contrast; Future    Heel spur,  right  -     MRI Foot (Hindfoot) Right Without Contrast; Future  -     X-Ray Foot Complete Right; Future    Right Achilles bursitis  -     MRI Foot (Hindfoot) Right Without Contrast; Future    Gastroesophageal reflux disease with esophagitis  -     Ambulatory Referral to Gastroenterology    Neuritis of right foot    Tarsal tunnel syndrome of right side    Other orders  -     betamethasone acetate-betamethasone sodium phosphate injection 18 mg      Reviewed today's xrays with patient and discussed large spur, most likely contributing to pain in this area with chronic Achilles tendinitis and plantar fasciitis.  Advised patient with no improvement with cortisone injection last visit I suspect a partial tear.  Advised patient MRI needs to be performed and this will help us decide if surgery absolutely needs to be pursued at this point.  Patient was in agreement with treatment plan.  Discussed increasing gabapentin with PCP  Reviewed tarsal tunnel with patient  Continue Voltaren gel 3 times daily.  Ice three times daily  Wear high wedge shoe to take tension off achilles.  Patient was in understanding and agreement with treatment plan.  We reviewed IM cortisone injection on the right side to help with inflammation and pain.   Patient reported she would like to try cortisone injection today due to a level of radiating pain in the right lower leg  I counseled the patient on her conditions, their implications and medical management.  Instructed patient to contact the office with any changes, questions, concerns, worsening of symptoms  Referral sent to gastroenterology  Patient verbalized understanding.   Total face to face time, exam, assessment, treatment, discussion, documentation >40 minutes, more than half this time spent on consultation and coordination of care.     Follow up 2 weeks     This note was created using [employer] voice recognition software that occasionally misinterpreted phrases or words.

## 2019-09-05 ENCOUNTER — TELEPHONE (OUTPATIENT)
Dept: PODIATRY | Facility: CLINIC | Age: 69
End: 2019-09-05

## 2019-09-05 ENCOUNTER — HOSPITAL ENCOUNTER (OUTPATIENT)
Dept: RADIOLOGY | Facility: HOSPITAL | Age: 69
Discharge: HOME | End: 2019-09-05
Attending: PODIATRIST
Payer: MEDICARE

## 2019-09-05 DIAGNOSIS — S86.011D PARTIAL TEAR OF ACHILLES TENDON, RIGHT, SUBSEQUENT ENCOUNTER: Primary | ICD-10-CM

## 2019-09-05 DIAGNOSIS — M76.61 ACHILLES TENDINITIS OF RIGHT LOWER EXTREMITY: ICD-10-CM

## 2019-09-05 DIAGNOSIS — M76.61 RIGHT ACHILLES BURSITIS: ICD-10-CM

## 2019-09-05 DIAGNOSIS — S86.011D PARTIAL TEAR OF ACHILLES TENDON, RIGHT, SUBSEQUENT ENCOUNTER: ICD-10-CM

## 2019-09-05 DIAGNOSIS — M77.31 HEEL SPUR, RIGHT: ICD-10-CM

## 2019-09-05 PROCEDURE — 73718 MRI LOWER EXTREMITY W/O DYE: CPT | Mod: TC,RT

## 2019-09-05 PROCEDURE — 73718 MRI LOWER EXTREMITY W/O DYE: CPT | Mod: 26,RT,, | Performed by: RADIOLOGY

## 2019-09-05 PROCEDURE — 73718 MRI FOOT (HINDFOOT) RIGHT WITHOUT CONTRAST: ICD-10-PCS | Mod: 26,RT,, | Performed by: RADIOLOGY

## 2019-09-05 NOTE — TELEPHONE ENCOUNTER
Spoke with patient results given. Patient doesn't have a boot but would like one. Notified Dr. Glenis Plaza so order could be placed.

## 2019-09-05 NOTE — TELEPHONE ENCOUNTER
----- Message from Glenis Plaza DPM sent at 9/5/2019 12:08 PM CDT -----  Please call patient with results of MRI. Radiologist noted tendonitis with thickening, fluid and inflammation and could not rule out a tear of the achilles tenon. Needs to apply ice and would recommend walking boot until our follow up if she has one, or we can order one.Thanks

## 2019-09-10 DIAGNOSIS — R92.8 ABNORMAL MAMMOGRAM: Primary | ICD-10-CM

## 2019-09-12 ENCOUNTER — OFFICE VISIT (OUTPATIENT)
Dept: PODIATRY | Facility: CLINIC | Age: 69
End: 2019-09-12
Payer: MEDICARE

## 2019-09-12 ENCOUNTER — TELEPHONE (OUTPATIENT)
Dept: PODIATRY | Facility: CLINIC | Age: 69
End: 2019-09-12

## 2019-09-12 VITALS
BODY MASS INDEX: 25.9 KG/M2 | TEMPERATURE: 98 F | HEART RATE: 85 BPM | DIASTOLIC BLOOD PRESSURE: 64 MMHG | SYSTOLIC BLOOD PRESSURE: 109 MMHG | WEIGHT: 165 LBS | HEIGHT: 67 IN | RESPIRATION RATE: 19 BRPM | OXYGEN SATURATION: 98 %

## 2019-09-12 DIAGNOSIS — M77.31 POSTERIOR CALCANEAL EXOSTOSIS, RIGHT: ICD-10-CM

## 2019-09-12 DIAGNOSIS — M76.61 RIGHT ACHILLES BURSITIS: ICD-10-CM

## 2019-09-12 DIAGNOSIS — M76.61 ACHILLES TENDINITIS OF RIGHT LOWER EXTREMITY: ICD-10-CM

## 2019-09-12 PROCEDURE — 1101F PR PT FALLS ASSESS DOC 0-1 FALLS W/OUT INJ PAST YR: ICD-10-PCS | Mod: CPTII,S$GLB,, | Performed by: PODIATRIST

## 2019-09-12 PROCEDURE — 3078F PR MOST RECENT DIASTOLIC BLOOD PRESSURE < 80 MM HG: ICD-10-PCS | Mod: CPTII,S$GLB,, | Performed by: PODIATRIST

## 2019-09-12 PROCEDURE — 99214 PR OFFICE/OUTPT VISIT, EST, LEVL IV, 30-39 MIN: ICD-10-PCS | Mod: S$GLB,,, | Performed by: PODIATRIST

## 2019-09-12 PROCEDURE — 99214 OFFICE O/P EST MOD 30 MIN: CPT | Mod: S$GLB,,, | Performed by: PODIATRIST

## 2019-09-12 PROCEDURE — 99999 PR PBB SHADOW E&M-EST. PATIENT-LVL III: ICD-10-PCS | Mod: PBBFAC,,, | Performed by: PODIATRIST

## 2019-09-12 PROCEDURE — 3078F DIAST BP <80 MM HG: CPT | Mod: CPTII,S$GLB,, | Performed by: PODIATRIST

## 2019-09-12 PROCEDURE — 3074F SYST BP LT 130 MM HG: CPT | Mod: CPTII,S$GLB,, | Performed by: PODIATRIST

## 2019-09-12 PROCEDURE — 99999 PR PBB SHADOW E&M-EST. PATIENT-LVL III: CPT | Mod: PBBFAC,,, | Performed by: PODIATRIST

## 2019-09-12 PROCEDURE — 1101F PT FALLS ASSESS-DOCD LE1/YR: CPT | Mod: CPTII,S$GLB,, | Performed by: PODIATRIST

## 2019-09-12 PROCEDURE — 3074F PR MOST RECENT SYSTOLIC BLOOD PRESSURE < 130 MM HG: ICD-10-PCS | Mod: CPTII,S$GLB,, | Performed by: PODIATRIST

## 2019-09-12 RX ORDER — FLUTICASONE PROPIONATE 50 MCG
SPRAY, SUSPENSION (ML) NASAL
COMMUNITY
Start: 2019-08-05 | End: 2020-01-13

## 2019-09-12 RX ORDER — HYDROCODONE BITARTRATE AND ACETAMINOPHEN 10; 325 MG/1; MG/1
TABLET ORAL
Refills: 0 | COMMUNITY
Start: 2019-07-09 | End: 2020-01-13

## 2019-09-12 RX ORDER — DIAZEPAM 5 MG/1
TABLET ORAL
Refills: 0 | COMMUNITY
Start: 2019-07-09 | End: 2020-01-13

## 2019-09-12 NOTE — LETTER
September 13, 2019      Clare Pan NP  [address]  [place] [address]           [facility]  [address] [place] [address] [place] [address]  Phone: [phone]  Fax: [phone]          Patient: Kathi Head   MR Number: [id number]   YOB: 1950   Date of Visit: 9/12/2019       Dear Clare Pan:    Thank you for referring Kathi Head to me for evaluation. Attached you will find relevant portions of my assessment and plan of care.    If you have questions, please do not hesitate to call me. I look forward to following Kathi Head along with you.    Sincerely,    Glenis Plaza, JEREMÍAS    Enclosure  CC:  No Recipients    If you would like to receive this communication electronically, please contact [email or web address] or [phone] to request more information on [employer] access.    For providers and/or their staff who would like to refer a patient to [facility], please contact us through our one-stop-shop provider referral line, [facility], at [phone].    If you feel you have received this communication in error or would no longer like to receive these types of communications, please e-mail [email or web address]

## 2019-09-12 NOTE — TELEPHONE ENCOUNTER
++Attempted to call pt- pt contact. LVM x1    +Attempted to call pt- mobile phone. No answer, continuously rings, unable to leave VM.    ----- Message from Glenis Plaza DPM sent at 9/12/2019  1:22 PM CDT -----  Please call pt and advise I confirmed with director of our clinic, [place] [facility] [place] Dr Josey Gibbons, whom I have used before and is very thorough. I can send ref or she can ask her PCP. Thanks

## 2019-09-13 PROBLEM — M77.31 POSTERIOR CALCANEAL EXOSTOSIS, RIGHT: Status: ACTIVE | Noted: 2019-09-13

## 2019-09-14 NOTE — PROGRESS NOTES
Subjective:      Patient ID: Kathi Head is a 68 y.o. female.    Chief Complaint: Follow-up (MRI results)  Patient presents  for follow-up Achilles tendinitis/bursitis/ posterior calcaneal spur right foot and MRI results of MRI.   Reports IM cortisone injection has helped quite a bit. Patient confirms she received phone call from nurse briefly outline results of MRI and did receive walking boot.  She states it has been comfortable, she has no pain in will wearing around the house.  States it is difficult to use outside of the home and obviously cannot wear when she drives. Reports concern regarding sharp shooting neuropathy pain in upper legs, cortisone did not help this pain.          ROS  Constitutional    Pleasant, well-nourished, no distress, well oriented    Eyes         GLASSES    Cardiovascular          No chest pain, no shortness of breath    Respiratory           No cough, no congestion     Gastrointestinal          Severe GERD    Musculoskeletal         YES muscle aches, YES arthralgias/joint pain, no swelling in the extremities    Neurologic         Reports previous history of neuropathy upper legs           Objective:      Physical Exam   Cardiovascular:   Pulses:       Dorsalis pedis pulses are 2+ on the right side, and 2+ on the left side.        Posterior tibial pulses are 2+ on the right side, and 2+ on the left side.   Musculoskeletal:        Right foot: There is decreased range of motion.        Left foot: There is decreased range of motion.   Feet:   Right Foot:   Protective Sensation: 2 sites tested. 2 sites sensed.   Left Foot:   Protective Sensation: 2 sites tested. 2 sites sensed.   Vascular         Normal CFT bilateral    No lower extremity edema   Pedal skin temperature and color are normal    Integumentary       Chronic mild bursitis posterior right heel, no erythema, no calor         Neurological   Gross sensation intact, decreased tarsal tunnel pain, tender, no  paresthesias    Musculoskeletal   Muscle Strength/Testing and Tone:  Intact, normal tone  Joints, Bones, and Muscles: Ankle joint equinus R>L  Pain achilles tendon insertion right with bursitis  Discomfort plantar fascial pain right    MRI Foot (Hindfoot) Right Without Contrast    Details     Reading Physician Reading Date Result Priority   Sushila Duque MD 9/5/2019 Routine      Narrative     EXAMINATION:  MRI FOOT (HINDFOOT) RIGHT WITHOUT CONTRAST    CLINICAL HISTORY:  Disloc & sprain of joints & ligaments at ankl, ft & toe lev;  Strain of right Achilles tendon, subsequent encounter    TECHNIQUE:  Routine multiplanar non-contrast MR imaging of the right ankle/hindfoot was performed.    COMPARISON:  None.    FINDINGS:  There is fluid around the anterior margin of the distal Achilles tendon and very slight edema of the adjacent calcaneus at the site of insertion of the Achilles tendon.  The distal Achilles tendon at the insertion site is mildly thickened with linear increased T1 and T2 signal.  Finding could represent insertional tendinitis, partial tear or combination of the 2.  Location more suggestive of insertional tendinitis.      Impression       Abnormal appearance of the Achilles tendon which could reflect insertional tendinitis and/or/partial tear      Electronically signed by: Sushila Duque MD               X-Ray Foot Complete Right    Details     Reading Physician Reading Date Result Priority   Reji Galvez MD 8/29/2019 Routine      Narrative     EXAMINATION:  XR FOOT COMPLETE 3 VIEW RIGHT    CLINICAL HISTORY:  . Calcaneal spur, right foot    TECHNIQUE:  AP, lateral, and oblique views of the right foot were performed.    COMPARISON:  08/29/2018.    FINDINGS:  No acute fracture or dislocation.  No significant soft tissue swelling.    Mild degenerative osteoarthrosis of the 1st MTP joint and the IP joints of the digits.  Remaining joint spaces are preserved.  The tarsal bones are intact.  Small  os peroneum.  Normal tarsometatarsal alignment.    Prominent dorsal and plantar calcaneal spurs.      Impression       1. Mild degenerative osteoarthrosis.  2. Calcaneal spurs.                   Assessment:       Encounter Diagnoses   Name Primary?    Achilles tendinitis of right lower extremity     Posterior calcaneal exostosis, right     Right Achilles bursitis          Plan:       Kathi was seen today for follow-up.    Diagnoses and all orders for this visit:    Achilles tendinitis of right lower extremity    Posterior calcaneal exostosis, right    Right Achilles bursitis    Other orders  -     Cancel: Ambulatory Referral to Neurology      Reviewed results of MRI at length with patient and dispensed copy.  Explained edema/swelling at the insertion, thickening and increased signal in which radiologist impression was either insertional tendinitis or partial tear, in my opinion is most likely an insertional tear.  Explained these can be difficult to observe on MRI and/or noted intraoperatively.  Explained to the patient however 4-6 weeks of immobilization can heal this area.  IM cortisone injection may have been more beneficial since she was immobilized.  We reviewed x-rays and very large calcaneal spur, not much since room for Achilles tendon and soft tissue noted on the back of the heel before rubbing occurs on the tendon due to the spur.  Advised if conservative treatments with immobilization for 4-6 weeks are not helpful she may want to consider surgical intervention, however that should be a last resort.  Instructed patient walking boot needs to be worn at all times except for driving, she needs to wear it out to the car, take it off to drive and put it back on before exiting the car  For any walking at any place.  Explained it is only going to be beneficial if it was worn at all times except for driving, bed and bathing.  We reviewed ice /cool therapy and frequency this should be performed and application  of Voltaren gel.  Explained since IM cortisone injection was not helpful with neuritis/neuropathy pain this definitely needs to be further investigated by neurologist.  Advised patient closest [facility] neurologist is most likely in [place], will check if there is 1 in [place] and call her with name.  Highly encouraged patient to discuss referral to Neurology with her nurse practitioner Clare Pan along with an increase of gabapentin. Patient was in understanding and agreement with treatment plan.   Patricia counseled the patient on her conditions, their implications and medical management.  Instructed patient to contact the office with any changes, questions, concerns, worsening of symptoms  Referral sent to gastroenterology  Patient verbalized understanding.   Total face to face time, exam, assessment, treatment, discussion, documentation 25 minutes, more than half this time spent on consultation and coordination of care.     Follow up 4-6 weeks     This note was created using [employer] voice recognition software that occasionally misinterpreted phrases or words.

## 2019-09-27 ENCOUNTER — HOSPITAL ENCOUNTER (OUTPATIENT)
Dept: RADIOLOGY | Facility: HOSPITAL | Age: 69
Discharge: HOME | End: 2019-09-27
Attending: NURSE PRACTITIONER
Payer: MEDICARE

## 2019-09-27 DIAGNOSIS — R92.8 ABNORMAL MAMMOGRAM: ICD-10-CM

## 2019-09-27 PROCEDURE — 76642 ULTRASOUND BREAST LIMITED: CPT | Mod: 26,LT,, | Performed by: RADIOLOGY

## 2019-09-27 PROCEDURE — 77061 MAMMO DIGITAL DIAGNOSTIC LEFT WITH TOMOSYNTHESIS_CAD: ICD-10-PCS | Mod: 26,LT,, | Performed by: RADIOLOGY

## 2019-09-27 PROCEDURE — 77065 MAMMO DIGITAL DIAGNOSTIC LEFT WITH TOMOSYNTHESIS_CAD: ICD-10-PCS | Mod: 26,LT,, | Performed by: RADIOLOGY

## 2019-09-27 PROCEDURE — 77065 DX MAMMO INCL CAD UNI: CPT | Mod: 26,LT,, | Performed by: RADIOLOGY

## 2019-09-27 PROCEDURE — 77061 BREAST TOMOSYNTHESIS UNI: CPT | Mod: 26,LT,, | Performed by: RADIOLOGY

## 2019-09-27 PROCEDURE — 76642 ULTRASOUND BREAST LIMITED: CPT | Mod: TC,LT

## 2019-09-27 PROCEDURE — 76642 US BREAST LEFT LIMITED: ICD-10-PCS | Mod: 26,LT,, | Performed by: RADIOLOGY

## 2019-09-27 PROCEDURE — 77065 DX MAMMO INCL CAD UNI: CPT | Mod: TC,LT

## 2019-09-27 PROCEDURE — 77061 BREAST TOMOSYNTHESIS UNI: CPT | Mod: TC,LT

## 2019-10-01 ENCOUNTER — TELEPHONE (OUTPATIENT)
Dept: PODIATRY | Facility: CLINIC | Age: 69
End: 2019-10-01

## 2019-10-01 NOTE — TELEPHONE ENCOUNTER
----- Message from Mar Talbot sent at 10/1/2019 10:27 AM CDT -----  Type: Needs Medical Advice    Who Called:  Patient   Best Call Back Number: [phone]  Additional Information: patient  States boot is not helping the pain with her heel spur, contact to advise if she should have a shot or surgery.     Thank you

## 2019-10-08 ENCOUNTER — OFFICE VISIT (OUTPATIENT)
Dept: PODIATRY | Facility: CLINIC | Age: 69
End: 2019-10-08
Payer: MEDICARE

## 2019-10-08 VITALS
OXYGEN SATURATION: 98 % | HEIGHT: 67 IN | WEIGHT: 165 LBS | SYSTOLIC BLOOD PRESSURE: 113 MMHG | RESPIRATION RATE: 18 BRPM | DIASTOLIC BLOOD PRESSURE: 65 MMHG | BODY MASS INDEX: 25.9 KG/M2 | HEART RATE: 84 BPM | TEMPERATURE: 98 F

## 2019-10-08 DIAGNOSIS — M76.61 ACHILLES TENDINITIS OF RIGHT LOWER EXTREMITY: ICD-10-CM

## 2019-10-08 DIAGNOSIS — S86.011D PARTIAL TEAR OF ACHILLES TENDON, RIGHT, SUBSEQUENT ENCOUNTER: ICD-10-CM

## 2019-10-08 DIAGNOSIS — M77.31 POSTERIOR CALCANEAL EXOSTOSIS, RIGHT: ICD-10-CM

## 2019-10-08 DIAGNOSIS — M25.571 ACUTE RIGHT ANKLE PAIN: ICD-10-CM

## 2019-10-08 PROCEDURE — 1101F PR PT FALLS ASSESS DOC 0-1 FALLS W/OUT INJ PAST YR: ICD-10-PCS | Mod: CPTII,S$GLB,, | Performed by: PODIATRIST

## 2019-10-08 PROCEDURE — 99999 PR PBB SHADOW E&M-EST. PATIENT-LVL III: ICD-10-PCS | Mod: PBBFAC,,, | Performed by: PODIATRIST

## 2019-10-08 PROCEDURE — 3078F PR MOST RECENT DIASTOLIC BLOOD PRESSURE < 80 MM HG: ICD-10-PCS | Mod: CPTII,S$GLB,, | Performed by: PODIATRIST

## 2019-10-08 PROCEDURE — 99999 PR PBB SHADOW E&M-EST. PATIENT-LVL III: CPT | Mod: PBBFAC,,, | Performed by: PODIATRIST

## 2019-10-08 PROCEDURE — 3074F SYST BP LT 130 MM HG: CPT | Mod: CPTII,S$GLB,, | Performed by: PODIATRIST

## 2019-10-08 PROCEDURE — 1101F PT FALLS ASSESS-DOCD LE1/YR: CPT | Mod: CPTII,S$GLB,, | Performed by: PODIATRIST

## 2019-10-08 PROCEDURE — 99214 OFFICE O/P EST MOD 30 MIN: CPT | Mod: S$GLB,,, | Performed by: PODIATRIST

## 2019-10-08 PROCEDURE — 3074F PR MOST RECENT SYSTOLIC BLOOD PRESSURE < 130 MM HG: ICD-10-PCS | Mod: CPTII,S$GLB,, | Performed by: PODIATRIST

## 2019-10-08 PROCEDURE — 99214 PR OFFICE/OUTPT VISIT, EST, LEVL IV, 30-39 MIN: ICD-10-PCS | Mod: S$GLB,,, | Performed by: PODIATRIST

## 2019-10-08 PROCEDURE — 3078F DIAST BP <80 MM HG: CPT | Mod: CPTII,S$GLB,, | Performed by: PODIATRIST

## 2019-10-08 RX ORDER — PANTOPRAZOLE SODIUM 40 MG/1
TABLET, DELAYED RELEASE ORAL
COMMUNITY
Start: 2019-09-26 | End: 2020-01-13

## 2019-10-08 RX ORDER — LANOLIN ALCOHOL/MO/W.PET/CERES
400 CREAM (GRAM) TOPICAL
COMMUNITY
Start: 2019-10-01

## 2019-10-08 RX ORDER — GLUCOSAM/CHONDRO/HERB 149/HYAL 750-100 MG
TABLET ORAL
COMMUNITY
Start: 2019-10-01

## 2019-10-08 NOTE — LETTER
October 9, 2019      Clare Pan NP  [address]  [place] [address]           [facility]  [address] [place] [address] [place] [address]  Phone: [phone]  Fax: [phone]          Patient: Kathi Head   MR Number: [id number]   YOB: 1950   Date of Visit: 10/8/2019       Dear Clare Pan:    Thank you for referring Kathi Head to me for evaluation. Attached you will find relevant portions of my assessment and plan of care.    If you have questions, please do not hesitate to call me. I look forward to following Kathi Head along with you.    Sincerely,    Glenis Plaza, JEREMÍAS    Enclosure  CC:  No Recipients    If you would like to receive this communication electronically, please contact [email or web address] or [phone] to request more information on [employer] Link access.    For providers and/or their staff who would like to refer a patient to [facility], please contact us through our one-stop-shop provider referral line, [facility], at [phone].    If you feel you have received this communication in error or would no longer like to receive these types of communications, please e-mail [email or web address]

## 2019-10-09 NOTE — PROGRESS NOTES
Subjective:      Patient ID: Kathi Head is a 68 y.o. female.    Chief Complaint: Follow-up  Patient presents for follow-up Achilles tendinitis/bursitis/ possible tear/posterior calcaneal spur right foot.  Patient has been in walking boot had about 3 months, states when she is in the boot she is pain-free. Has some discomfort on the front of the ankle, no swelling.  She has tried walking through [email or web address] without the boot and had pain, not as severe, but still painful.  There are times she walks without the boot at home and is not painful.  She does feel it is improving, however concern she may end up needing surgery.   Reports no change in chronic sinus problems she has been having, no improvement following surgery.   Complains of reflux, postnasal drip, neck pain.  She has had to sleep sitting up due to these symptoms, but contributes to her neck pain.  Patient continues to have pain, neuropathy type symptoms in the upper leg/groin area and is seeing Dr. Solis in [place] on October 23rd.      ROS  Constitutional    Pleasant, well-nourished, no distress, well oriented    Eyes         GLASSES    Cardiovascular          No chest pain, no shortness of breath    Respiratory           Chronic sinus problems, no cough, no congestion     Gastrointestinal          Severe GERD    Musculoskeletal          YES neck pain no swelling in the extremities    Neurologic         History of neuropathy upper legs           Objective:      Physical Exam   Cardiovascular:   Pulses:       Dorsalis pedis pulses are 2+ on the right side, and 2+ on the left side.        Posterior tibial pulses are 2+ on the right side, and 2+ on the left side.   Musculoskeletal:        Right foot: There is decreased range of motion.        Left foot: There is decreased range of motion.   Feet:   Right Foot:   Protective Sensation: 2 sites tested. 2 sites sensed.   Left Foot:   Protective Sensation: 2 sites tested. 2 sites sensed.   Vascular          Normal CFT bilateral    No lower extremity edema   Pedal skin temperature and color are normal    Integumentary       Chronic mild bursitis posterior right heel, improved, no erythema, no calor         Neurological   Gross sensation intact, decreased tarsal tunnel pain, tender, no paresthesias    Musculoskeletal   Muscle Strength/Testing and Tone:  Intact, normal tone  Joints, Bones, and Muscles: Ankle joint equinus R>L  Less pain achilles tendon insertion right, better ROM  Some discomfort over the anterior right ankle most likely due to immobility, compression from walking boot.  No tendonitis, no erythema edema  Less discomfort plantar fascial pain right      MRI Foot (Hindfoot) Right Without Contrast    Details     Reading Physician Reading Date Result Priority   Sushila Duque MD 9/5/2019 Routine      Narrative     EXAMINATION:  MRI FOOT (HINDFOOT) RIGHT WITHOUT CONTRAST    CLINICAL HISTORY:  Disloc & sprain of joints & ligaments at ankl, ft & toe lev;  Strain of right Achilles tendon, subsequent encounter    TECHNIQUE:  Routine multiplanar non-contrast MR imaging of the right ankle/hindfoot was performed.    COMPARISON:  None.    FINDINGS:  There is fluid around the anterior margin of the distal Achilles tendon and very slight edema of the adjacent calcaneus at the site of insertion of the Achilles tendon.  The distal Achilles tendon at the insertion site is mildly thickened with linear increased T1 and T2 signal.  Finding could represent insertional tendinitis, partial tear or combination of the 2.  Location more suggestive of insertional tendinitis.      Impression       Abnormal appearance of the Achilles tendon which could reflect insertional tendinitis and/or/partial tear      Electronically signed by: Sushila Duque MD                     Assessment:       Encounter Diagnoses   Name Primary?    Partial tear of Achilles tendon, right, subsequent encounter     Acute right ankle pain      Achilles tendinitis of right lower extremity     Posterior calcaneal exostosis, right          Plan:       Kathi was seen today for follow-up.    Diagnoses and all orders for this visit:    Partial tear of Achilles tendon, right, subsequent encounter  -     Ambulatory consult to Physical Therapy    Acute right ankle pain  -     Ambulatory consult to Physical Therapy    Achilles tendinitis of right lower extremity  -     Ambulatory consult to Physical Therapy    Posterior calcaneal exostosis, right  -     Ambulatory consult to Physical Therapy      Reviewed MRI results with patient advised we are treating this as if it is an Achilles tendon tear.  Explained the patient she has 3 more weeks in the walking boot an obviously I would not recommend she walk  Through [facility] or anywhere else without her boot at this time.  Explained as long as she is pain free in the boot she needs to remain in it at all times of weight-bearing.  Today a diabetic arch support with quarter inch felt heel lift was added to the boot to help with support, stabilization, arch support for plantar fascial discomfort and to get the patient ready to adjust to tennis shoes.  We discussed a better shoe on the left foot while she is in this walking boot to try to even her gait.  Explained to patient if inflammation and pain is resolved and Achilles tendon is stretched and strength and all of her pain may completely resolve and she will not have to pursue surgery.  Advised patient would highly recommend she start physical therapy nail with ultrasound well she has 3 more weeks remaining in the boot.  After she is released from the boot continuing therapy to help with full range of motion to prevent recurrence.  Explained if these conservative options fail only than what I would recommend she pursue surgery.  Patient was in understanding and agreement with treatment plan.  Showed patient light range of motion exercises she can do at home  to help with  discomfort on the front of the ankle.  The stiffness due to immobility.  Discussed padding this area better while in the boot    We reviewed ice/cool therapy.  Recommended referral Dr. Graciela Suh interventional pain management for neck pain, patient declined at this time   Patricia counseled the patient on her conditions, their implications and medical management.  Instructed patient to contact the office with any changes, questions, concerns, worsening of symptoms  Patient verbalized understanding.   Total face to face time, exam, assessment, treatment, discussion, documentation 25 minutes, more than half this time spent on consultation and coordination of care.     Follow up 3 weeks     This note was created using [employer] voice recognition software that occasionally misinterpreted phrases or words.

## 2019-11-07 ENCOUNTER — OFFICE VISIT (OUTPATIENT)
Dept: PODIATRY | Facility: CLINIC | Age: 69
End: 2019-11-07
Payer: MEDICARE

## 2019-11-07 VITALS
OXYGEN SATURATION: 98 % | RESPIRATION RATE: 18 BRPM | TEMPERATURE: 99 F | HEIGHT: 67 IN | WEIGHT: 165 LBS | DIASTOLIC BLOOD PRESSURE: 77 MMHG | SYSTOLIC BLOOD PRESSURE: 121 MMHG | BODY MASS INDEX: 25.9 KG/M2 | HEART RATE: 73 BPM

## 2019-11-07 DIAGNOSIS — M76.61 RIGHT ACHILLES BURSITIS: ICD-10-CM

## 2019-11-07 DIAGNOSIS — M76.61 ACHILLES TENDINITIS OF RIGHT LOWER EXTREMITY: Primary | ICD-10-CM

## 2019-11-07 DIAGNOSIS — M77.31 POSTERIOR CALCANEAL EXOSTOSIS, RIGHT: ICD-10-CM

## 2019-11-07 PROCEDURE — 1101F PT FALLS ASSESS-DOCD LE1/YR: CPT | Mod: CPTII,S$GLB,, | Performed by: PODIATRIST

## 2019-11-07 PROCEDURE — 3078F DIAST BP <80 MM HG: CPT | Mod: CPTII,S$GLB,, | Performed by: PODIATRIST

## 2019-11-07 PROCEDURE — 99999 PR PBB SHADOW E&M-EST. PATIENT-LVL III: CPT | Mod: PBBFAC,,, | Performed by: PODIATRIST

## 2019-11-07 PROCEDURE — 99214 PR OFFICE/OUTPT VISIT, EST, LEVL IV, 30-39 MIN: ICD-10-PCS | Mod: S$GLB,,, | Performed by: PODIATRIST

## 2019-11-07 PROCEDURE — 3074F PR MOST RECENT SYSTOLIC BLOOD PRESSURE < 130 MM HG: ICD-10-PCS | Mod: CPTII,S$GLB,, | Performed by: PODIATRIST

## 2019-11-07 PROCEDURE — 99999 PR PBB SHADOW E&M-EST. PATIENT-LVL III: ICD-10-PCS | Mod: PBBFAC,,, | Performed by: PODIATRIST

## 2019-11-07 PROCEDURE — 3078F PR MOST RECENT DIASTOLIC BLOOD PRESSURE < 80 MM HG: ICD-10-PCS | Mod: CPTII,S$GLB,, | Performed by: PODIATRIST

## 2019-11-07 PROCEDURE — 99214 OFFICE O/P EST MOD 30 MIN: CPT | Mod: S$GLB,,, | Performed by: PODIATRIST

## 2019-11-07 PROCEDURE — 3074F SYST BP LT 130 MM HG: CPT | Mod: CPTII,S$GLB,, | Performed by: PODIATRIST

## 2019-11-07 PROCEDURE — 1101F PR PT FALLS ASSESS DOC 0-1 FALLS W/OUT INJ PAST YR: ICD-10-PCS | Mod: CPTII,S$GLB,, | Performed by: PODIATRIST

## 2019-11-07 RX ORDER — DULOXETIN HYDROCHLORIDE 30 MG/1
CAPSULE, DELAYED RELEASE ORAL
COMMUNITY
Start: 2019-10-24 | End: 2020-01-13

## 2019-11-07 NOTE — LETTER
November 10, 2019      Clare Pan NP  [address]  [place] [address]           [facility]  [address] [place] [address] [place] [address]  Phone: [phone]  Fax: [phone]          Patient: Kathi Head   MR Number: [id number]   YOB: 1950   Date of Visit: 11/7/2019       Dear Clare Pan:    Thank you for referring Kathi Head to me for evaluation. Attached you will find relevant portions of my assessment and plan of care.    If you have questions, please do not hesitate to call me. I look forward to following Kathi Head along with you.    Sincerely,    Glenis Plaza, JEREMÍAS    Enclosure  CC:  No Recipients    If you would like to receive this communication electronically, please contact [email or web address] or [phone] to request more information on [facility] [employer] access.    For providers and/or their staff who would like to refer a patient to [facility], please contact us through our one-stop-shop provider referral line, [facility], at [phone].    If you feel you have received this communication in error or would no longer like to receive these types of communications, please e-mail [email or web address]

## 2019-11-10 NOTE — PROGRESS NOTES
Subjective:      Patient ID: Kathi Head is a 69 y.o. female.    Chief Complaint: Follow-up (rt foot )  Patient presents for follow-up chronic Achilles tendinitis/bursitis with posterior calcaneal spur right foot.   Patient reports she has done very well with physical therapy, has noted improved range of motion, has a little ankle discomfort on the left side which may be due to using the walking boot for several weeks on the right   Has been slowly transitioning into her slip-on shoes with additional heel support, has not tried a closed tennis shoe.   Relates pain level 4/ 10 with prolonged walking or standing  Reports continued problems with her sinuses even after surgery performed by ENT a few months ago. Has to sleep with head elevated every night which caused neck pain. Also relates she had nerve conduction velocity test performed on the lower legs for neuropathy type symptoms in the upper leg/groin and is seeing Dr. Solis      Review of Systems   Constitution: Negative for fever.   HENT: Negative for congestion.         Chronic sinus problems   Cardiovascular: Negative for leg swelling.   Respiratory: Negative for cough.    All other systems reviewed and are negative.           Objective:      Physical Exam   Constitutional: She appears well-developed and well-nourished. No distress.   Cardiovascular:   Pulses:       Dorsalis pedis pulses are 2+ on the right side, and 2+ on the left side.        Posterior tibial pulses are 2+ on the right side, and 2+ on the left side.   Musculoskeletal:        Right foot: There is decreased range of motion.        Left foot: There is decreased range of motion.   Feet:   Right Foot:   Protective Sensation: 2 sites tested. 2 sites sensed.   Left Foot:   Protective Sensation: 2 sites tested. 2 sites sensed.   Vitals reviewed.  Vascular         Normal CFT bilateral    No lower extremity edema   Pedal skin temperature and color are normal    Integumentary       Chronic  bursitis posterior right heel, continues to improve, no erythema, no calor.  Inflammation in this is area is much reduced, best since I first started seeing the patient          Neurological   Gross sensation intact, no tarsal tunnel pain    Musculoskeletal   Muscle Strength/Testing and Tone:  Intact, normal tone  Joints, Bones, and Muscles:  Ankle joint dorsiflexion has improved significantly right foot with minimal discomfort  Chronic Achilles tendinitis right, minimal discomfort at the insertion today, thickened Achilles insertion can easily be palpated  No plantar fascial pain right  Normal AJ ROM left with no pain today       X-Ray Foot Complete Right   Details     Reading Physician Reading Date Result Priority   Reji Galvez MD 8/29/2019 Routine      Narrative     EXAMINATION:  XR FOOT COMPLETE 3 VIEW RIGHT    CLINICAL HISTORY:  . Calcaneal spur, right foot    TECHNIQUE:  AP, lateral, and oblique views of the right foot were performed.    COMPARISON:  08/29/2018.    FINDINGS:  No acute fracture or dislocation.  No significant soft tissue swelling.    Mild degenerative osteoarthrosis of the 1st MTP joint and the IP joints of the digits.  Remaining joint spaces are preserved.  The tarsal bones are intact.  Small os peroneum.  Normal tarsometatarsal alignment.    Prominent dorsal and plantar calcaneal spurs.      Impression       1. Mild degenerative osteoarthrosis.  2. Calcaneal spurs.             MRI Foot (Hindfoot) Right Without Contrast   Details     Reading Physician Reading Date Result Priority   Sushila Duque MD 9/5/2019 Routine      Narrative     EXAMINATION:  MRI FOOT (HINDFOOT) RIGHT WITHOUT CONTRAST    CLINICAL HISTORY:  Disloc & sprain of joints & ligaments at ankl, ft & toe lev;  Strain of right Achilles tendon, subsequent encounter    TECHNIQUE:  Routine multiplanar non-contrast MR imaging of the right ankle/hindfoot was performed.    COMPARISON:  None.    FINDINGS:  There is fluid around  the anterior margin of the distal Achilles tendon and very slight edema of the adjacent calcaneus at the site of insertion of the Achilles tendon.  The distal Achilles tendon at the insertion site is mildly thickened with linear increased T1 and T2 signal.  Finding could represent insertional tendinitis, partial tear or combination of the 2.  Location more suggestive of insertional tendinitis.      Impression       Abnormal appearance of the Achilles tendon which could reflect insertional tendinitis and/or/partial tear                     Assessment:       Encounter Diagnoses   Name Primary?    Achilles tendinitis of right lower extremity Yes    Right Achilles bursitis     Posterior calcaneal exostosis, right          Plan:       Kathi was seen today for follow-up.    Diagnoses and all orders for this visit:    Achilles tendinitis of right lower extremity    Right Achilles bursitis    Posterior calcaneal exostosis, right      Had a lengthy discussion with patient stating the amount of range of motion has improved considerably compared to previous visit, also the amount of inflammation of the back of the heel has improved significantly.  Explained these 2 conditions are most improved since I started seeing her several months ago.  Advised she needs to continue with therapy as long as beneficial and well tolerated.  We discussed how gradually transition into an appropriate shoe.  Advised patient although she has been wearing slip-on with additional heel padding because she could not tolerate anything touching the back of the heel they have not been helpful in supporting, bracing taking pressure off this area.  Advised patient she still needs to use topical anti-inflammatories, ice and Voltaren gel as directed, continue stretching daily even when she does not go to therapy this needs to be done in definitely in should be done on both feet.   Advised patient she should be avoiding any lengthy walking or standing and  if she needs to do so it should be in the walking boot with arch support until she can comfortably transition in to a tennis shoe.  We discussed preferably a running shoe with thick sole for shock absorption and additional arch support heel lift.  Advised patient as long as she improves weekly she needs to continue all conservative treatments on a daily basis indefinitely.  Advised if pain does not resolve completely or flares back up she will have exhausted all conservative treatments at this point in the next step most likely will be  recommending surgical intervention.  Again reviewed results of xrays and MRI, large posterior spur and potentially a chronic small tear.  Patient was in understanding and agreement with treatment plan  Counseled the patient on her conditions, their implications and medical management.  Instructed patient to contact the office with any changes, questions, concerns, worsening of symptoms  Patient verbalized understanding.   Total face to face time, exam, assessment, treatment, discussion, documentation 25 minutes, more than half this time spent on consultation and coordination of care.     Follow up as needed     This note was created using M*Modal voice recognition software that occasionally misinterpreted phrases or words.

## 2019-12-03 DIAGNOSIS — M54.2 NECK PAIN: Primary | ICD-10-CM

## 2019-12-09 ENCOUNTER — CLINICAL SUPPORT (OUTPATIENT)
Dept: REHABILITATION | Facility: HOSPITAL | Age: 69
End: 2019-12-09
Payer: MEDICARE

## 2019-12-09 DIAGNOSIS — M54.2 NECK PAIN: ICD-10-CM

## 2019-12-09 DIAGNOSIS — M54.50 LUMBAR PAIN: ICD-10-CM

## 2019-12-09 DIAGNOSIS — G89.29 CHRONIC BILATERAL THORACIC BACK PAIN: Primary | ICD-10-CM

## 2019-12-09 DIAGNOSIS — M54.6 CHRONIC BILATERAL THORACIC BACK PAIN: Primary | ICD-10-CM

## 2019-12-09 PROCEDURE — 97110 THERAPEUTIC EXERCISES: CPT | Mod: PN

## 2019-12-09 PROCEDURE — 97162 PT EVAL MOD COMPLEX 30 MIN: CPT | Mod: PN

## 2019-12-09 NOTE — PROGRESS NOTES
"Therapy Evaluation    Patient Name: Kathi Head  Date of Visit: 2019  MRN: [id number]  Diagnosis: M54.2 (ICD-10-CM) - Neck pain    Referring Physician: Clare Pan NP      Subjective:   PT/OT Start Time:  2019 2:00 PM  PT/OT End Time:  2019 3:00 PM  Total Time:  60  Number of visits authorized:  12  History of Present Illness:     Date of onset: 2018    Mechanism of injury:  Patient states her pain started after having a surgical procedure on her nose.     Work Injury: No      Prior Therapy:  Yes she was seen for 3 or 4 visits this year  Quality of life:  Fair (she is consumed by this muscus drainage into her stomach adn talks continously of this )  Patient reports:  Charlene reports having necka dn back problems.  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   She has problems sleeping because of the nasal congestion and has to sleep sitting up which exaccergates her problem  Pain:     Current pain ratin    At best pain ratin    At worst pain ratin    Location:  Neck, mid back, low back    Quality:  Dull, achy, tight and discomfort    Relieving factors:  Activity, change in position, massage and relaxation    Exacerbated by: sitting up in chair.  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 female referred to outpatient Physical Therapy and presents with a medical " "diagnosis of neck pain. She demonstrates limitations as described in the problem list. These impairments are limiting the patient's ability to read, sleep, work, and recreation.     Treatment: manual therapy to include occipital release in supine x 8 min, slouch correct 10 x 10 sec  Pt education in  safety with transfer , proper lifting technique, positioning for posture, pain managemtent      Assessment:     Discipline: referred to PT      Medical Necessity is demonstrated by the following:: abnormal muscle tone, abnormal or restricted ROM, activity intolerance, impaired physical strength, lacks appropriate home exercise program and pain with function      Other Impairment:  Trigger Points    Assessment details:  Patient is noted have mod impair  cervical rom with tightness, no radicular pain noted in UE this sessiosn.   She did have radicular pain in her L LE with SLR.  Pt has hypertonic UT/levatore,scalenes and paraspinal muscles.   She did not report any problems or pain in her feet.  Her primary concern is the reflux and mucus drainage with neck pain.     Barriers to therapy:  Failed procedures to correct nasal/throat issues    Prognosis: fair    Goals:     Goals:  "feel better  Short Term Goals (3 Weeks):  1. Pt will be compliant with HEP, safety with transfers, and be ind with pain management.   2. Pt will perform bending/lifting with good control to demonstrate improved core strength.  3. Pt will improve cervical ROM to </=minimal loss in all planes to improve functional mobility.  4. Pt will improve impaired LE MMTs to >/=4/5 to improve strength for functional tasks.  Long Term Goals (4-6Weeks):   1. Pt will improve Oswestry Neck Pain to 20% disability or less  3. Pt will improve sitting/standing tolerance for prolong period of time to complete a days work.  4. Pt will be able to return to prior activities to include amb community distances safely to shop for groceries.       Plan:   Therapy options: will " be seen for skilled physical therapy services    Discussed with:  Patient  Frequency:  2x week  Duration in visits:  12  Duration in weeks:  6  Planned modality interventions:  Electrical stimulation/Russian stimulation, iontophoresis, TENS, thermotherapy (hydrocollator packs), ultrasound and traction  Planned therapy interventions:  Aquatic therapy, manual therapy, neuromuscular reeducation, self care, soft tissue mobilization, spinal/joint mobilization, strength and endurance of respiratory muscles, strengthening and stretching  Other planned therapy interventions:  Dry needling prn  Plan details:  Supervised face to face visit with Teto Narayan PTA to discuss, current level of function, POC and progress toward goals.

## 2019-12-13 ENCOUNTER — CLINICAL SUPPORT (OUTPATIENT)
Dept: REHABILITATION | Facility: HOSPITAL | Age: 69
End: 2019-12-13
Payer: MEDICARE

## 2019-12-13 DIAGNOSIS — M54.2 NECK PAIN: ICD-10-CM

## 2019-12-13 DIAGNOSIS — M54.50 LUMBAR PAIN: ICD-10-CM

## 2019-12-13 PROCEDURE — 97014 ELECTRIC STIMULATION THERAPY: CPT | Mod: PN

## 2019-12-13 PROCEDURE — 97010 HOT OR COLD PACKS THERAPY: CPT | Mod: PN

## 2019-12-13 PROCEDURE — 97140 MANUAL THERAPY 1/> REGIONS: CPT | Mod: PN

## 2019-12-13 PROCEDURE — 97110 THERAPEUTIC EXERCISES: CPT | Mod: PN

## 2019-12-16 ENCOUNTER — CLINICAL SUPPORT (OUTPATIENT)
Dept: REHABILITATION | Facility: HOSPITAL | Age: 69
End: 2019-12-16
Payer: MEDICARE

## 2019-12-16 DIAGNOSIS — M54.2 NECK PAIN: ICD-10-CM

## 2019-12-16 DIAGNOSIS — M54.50 LUMBAR PAIN: ICD-10-CM

## 2019-12-16 PROCEDURE — 97110 THERAPEUTIC EXERCISES: CPT | Mod: PN

## 2019-12-16 NOTE — PROGRESS NOTES
Physical Therapy Daily Treatment Note     Name: Kathi Baker [place]  Clinic Number: [id number]    Therapy Diagnosis:   Encounter Diagnoses   Name Primary?    Neck pain     Lumbar pain      Physician: Clare Pan NP    Visit Date: 12/13/2019    Physician Orders: henny and tx  Medical Diagnosis: Diagnosis: M54.2 (ICD-10-CM) - Neck pain  Evaluation Date: 12/9/19  Authorization Period Expiration:   Plan of Care Certification Period: 3/9/20  Visit #/Visits authorized: 2/ 12      Time In: 400   Time Out: 500  Total Billable Time: 60 minutes    Precautions: none    Subjective     Pt reports: tension in shoulders.  She was compliant with home exercise program.  Response to previous treatment: no major changes  Functional change: no    Pain: 6/10  Location: bilateral neck      Objective     Kathi received therapeutic exercises to develop strength, endurance, ROM, flexibility, posture and core stabilization for 30  minutes including:  slouch correct x 10  scap retract x 10  Shoulder rolls x 20 cretract      Kathi received the following manual therapy techniques: Myofacial release were applied to the: maurizio UT for 15 minutes, including:occipital release in supine, Trp release scalenes, UT, levator.     Unsupervised modalities IFC to R shoulder/periscapula for pain modulation  x 15 min with MHP.  Call bell placed within reach and 5 min check to ensure comfort.       Home Exercises Provided and Patient Education Provided safety with transfers and gait, proper lifting technique, positioning for posture, pain managemtent    Written Home Exercises Provided: when appropriate.  Exercises were reviewed and Kathiwas able to demonstrate them prior to the end of the session.  Kathi demonstrated good  understanding of the education provided.     See EMR under Media for exercises provided next session.      Assessment     peyton well with no    Kathi is progressing well towards her goals.   Pt prognosis is Good.      Pt will continue to benefit from skilled outpatient physical therapy to address the deficits listed in the problem list box on initial evaluation, provide pt/family education and to maximize pt's level of independence in the home and community environment.     Pt's spiritual, cultural and educational needs considered and pt agreeable to plan of care and goals.    Anticipated barriers to physical therapy: none at this time    Goals: Short Term Goals (3 Weeks):  1. Pt will be compliant with HEP, safety with transfers, and be ind with pain management.   2. Pt will perform bending/lifting with good control to demonstrate improved core strength.  3. Pt will improve cervical ROM to </=minimal loss in all planes to improve functional mobility.  4. Pt will improve impaired LE MMTs to >/=4/5 to improve strength for functional tasks.  Long Term Goals (4-6Weeks):   1. Pt will improve Oswestry Neck Pain to 20% disability or less  3. Pt will improve sitting/standing tolerance for prolong period of time to complete a days work.  4. Pt will be able to return to prior activities to include amb community distances safely to shop for groceries.     Plan         Nano Christensen, PT

## 2019-12-16 NOTE — PROGRESS NOTES
Physical Therapy Daily Treatment Note     Name: Kathi Baker [place]  Clinic Number: [id number]    Therapy Diagnosis:   Encounter Diagnoses   Name Primary?    Neck pain     Lumbar pain      Physician: Clare Pan NP    Visit Date: 12/16/2019    Physician Orders: henny and tx  Medical Diagnosis: Diagnosis: M54.2 (ICD-10-CM) - Neck pain  Evaluation Date: 12/9/19  Authorization Period Expiration:   Plan of Care Certification Period: 3/9/20  Visit #/Visits authorized: 3/ 12      Time In: 210   Time Out: 300  Total Billable Time: 65 minutes    Precautions: none    Subjective     Pt reports: tension in shoulders.  She was compliant with home exercise program.  Response to previous treatment: no major changes  Functional change: no    Pain: 6/10  Location: bilateral neck      Objective     Kathi received therapeutic exercises to develop strength, endurance, ROM, flexibility, posture and core stabilization for 50 minutes including:  slouch correct x 10  scap retract x 10  Shoulder rolls x 20   c retract x 2 x 20  abd stab 2 x 10  Pelvic tilt 2 x 20  SKC x 2  DKC x 2  Supine marching 2 x 20  BKF x 20    NOT PERFORMED THIS SESSION  Kathi received the following manual therapy techniques: Myofacial release were applied to the: maurizio UT for 15 minutes, including:occipital release in supine, Trp release scalenes, UT, levator.   NOT PERFORMED THIS SESSION  Unsupervised modalities IFC to R shoulder/periscapula for pain modulation  x 15 min with MHP.  Call bell placed within reach and 5 min check to ensure comfort.       Home Exercises Provided and Patient Education Provided safety with transfers and gait, proper lifting technique, positioning for posture, pain managemtent    Written Home Exercises Provided: when appropriate.  Exercises were reviewed and Kathiwas able to demonstrate them prior to the end of the session.  Kathi demonstrated good  understanding of the education provided.     See EMR under  Media for exercises provided; patient given handout with the exes stated above 2 x 15    Assessment     pey[place] well with no    Kathi is progressing well towards her goals.   Pt prognosis is Good.     Pt will continue to benefit from skilled outpatient physical therapy to address the deficits listed in the problem list box on initial evaluation, provide pt/family education and to maximize pt's level of independence in the home and community environment.     Pt's spiritual, cultural and educational needs considered and pt agreeable to plan of care and goals.    Anticipated barriers to physical therapy: none at this time    Goals: Short Term Goals (3 Weeks):  1. Pt will be compliant with HEP, safety with transfers, and be ind with pain management.   2. Pt will perform bending/lifting with good control to demonstrate improved core strength.  3. Pt will improve cervical ROM to </=minimal loss in all planes to improve functional mobility.  4. Pt will improve impaired LE MMTs to >/=4/5 to improve strength for functional tasks.  Long Term Goals (4-6Weeks):   1. Pt will improve Oswestry Neck Pain to 20% disability or less  3. Pt will improve sitting/standing tolerance for prolong period of time to complete a days work.  4. Pt will be able to return to prior activities to include amb community distances safely to shop for groceries.     Plan         Nano Christensen, PT

## 2019-12-16 NOTE — TELEPHONE ENCOUNTER
Spoke with pt. Informed pt of DPM communication. Verbalized an understanding. States she will call her PCP.   Observation and assessment

## 2019-12-16 NOTE — PATIENT INSTRUCTIONS
Home Exercises and Patient Education Provided: importance of posture and secondary impairments associated if not managed; expectations of patient during POC and available services offered. Diaphragmatic Breathing    Lying down, place one hand over your chest and one hand over your stomach. Take slow, deep breaths. The hand on the CHEST should not rise or fall. Attempt to push your bottom ribs out and your belly button up toward the ceiling as you breathe in. Take 5-6 seconds to fully exhale and repeat.  Repeat 10 minutes per session. Do 3 sessions per day.

## 2019-12-18 ENCOUNTER — CLINICAL SUPPORT (OUTPATIENT)
Dept: REHABILITATION | Facility: HOSPITAL | Age: 69
End: 2019-12-18
Payer: MEDICARE

## 2019-12-18 DIAGNOSIS — M54.2 NECK PAIN: ICD-10-CM

## 2019-12-18 DIAGNOSIS — M54.50 LUMBAR PAIN: ICD-10-CM

## 2019-12-18 PROCEDURE — 97110 THERAPEUTIC EXERCISES: CPT | Mod: PN

## 2019-12-18 PROCEDURE — 97140 MANUAL THERAPY 1/> REGIONS: CPT | Mod: PN

## 2019-12-18 NOTE — PROGRESS NOTES
Physical Therapy Daily Treatment Note     Name: Kathi Baker [place]  Clinic Number: [id number]    Therapy Diagnosis:   Encounter Diagnoses   Name Primary?    Neck pain     Lumbar pain      Physician: Clare Pan NP    Visit Date: 12/18/2019    Physician Orders: henny and tx  Medical Diagnosis: Diagnosis: M54.2 (ICD-10-CM) - Neck pain  Evaluation Date: 12/9/19  Authorization Period Expiration:   Plan of Care Certification Period: 3/9/20  Visit #/Visits authorized: 4/ 12      Time In: 200   Time Out: 245  Total Billable Time: 45 minutes    Precautions: none    Subjective     Pt reports: tension in shoulders.  She was compliant with home exercise program.  Response to previous treatment: no major changes  Functional change: no    Pain: 6/10  Location: bilateral neck      Objective     Kathi received therapeutic exercises to develop strength, endurance, ROM, flexibility, posture and core stabilization for 20 minutes including:  Cycling x 15 min  slouch correct x 10  scap retract x 10  Shoulder rolls x 20     NOT PERFORMED THIS SESSION  c retract x 2 x 20  abd stab 2 x 10  Pelvic tilt 2 x 20  SKC x 2  DKC x 2  Supine marching 2 x 20  BKF x 20      Kathi received the following manual therapy technique x 25 min: Myofacial release were applied to the: paraspinals parallel to the spine maurizio, maurizio UT/levator, Trp release  UT, levator/upper trap.  MET to to all planes of scapula maurizio   NOT PERFORMED THIS SESSION  Unsupervised modalities IFC to R shoulder/periscapula for pain modulation  x 15 min with MHP.  Call bell placed within reach and 5 min check to ensure comfort.       Home Exercises Provided and Patient Education Provided safety with transfers and gait, proper lifting technique, positioning for posture, pain managemtent    Written Home Exercises Provided: when appropriate.  Exercises were reviewed and Kathiwas able to demonstrate them prior to the end of the session.  Kathi demonstrated good   understanding of the education provided.     See EMR under Media for exercises provided; patient given handout with the exes stated above 2 x 15    Assessment   Pt felt relaxed after session and pleased with inc rom of neck and thoracic spine    Kathi is progressing well towards her goals.   Pt prognosis is Good.     Pt will continue to benefit from skilled outpatient physical therapy to address the deficits listed in the problem list box on initial evaluation, provide pt/family education and to maximize pt's level of independence in the home and community environment.     Pt's spiritual, cultural and educational needs considered and pt agreeable to plan of care and goals.    Anticipated barriers to physical therapy: none at this time    Goals: Short Term Goals (3 Weeks):  1. Pt will be compliant with HEP, safety with transfers, and be ind with pain management.   2. Pt will perform bending/lifting with good control to demonstrate improved core strength.  3. Pt will improve cervical ROM to </=minimal loss in all planes to improve functional mobility.  4. Pt will improve impaired LE MMTs to >/=4/5 to improve strength for functional tasks.  Long Term Goals (4-6Weeks):   1. Pt will improve Oswestry Neck Pain to 20% disability or less  3. Pt will improve sitting/standing tolerance for prolong period of time to complete a days work.  4. Pt will be able to return to prior activities to include amb community distances safely to shop for groceries.     Plan   Cont with SANDHYA Christensen, PT

## 2019-12-23 ENCOUNTER — CLINICAL SUPPORT (OUTPATIENT)
Dept: REHABILITATION | Facility: HOSPITAL | Age: 69
End: 2019-12-23
Payer: MEDICARE

## 2019-12-23 DIAGNOSIS — M54.2 NECK PAIN: ICD-10-CM

## 2019-12-23 DIAGNOSIS — M54.50 LUMBAR PAIN: ICD-10-CM

## 2019-12-23 PROCEDURE — 97110 THERAPEUTIC EXERCISES: CPT | Mod: PN

## 2019-12-23 PROCEDURE — 97140 MANUAL THERAPY 1/> REGIONS: CPT | Mod: PN

## 2019-12-23 PROCEDURE — 97010 HOT OR COLD PACKS THERAPY: CPT | Mod: PN

## 2019-12-23 PROCEDURE — 97014 ELECTRIC STIMULATION THERAPY: CPT | Mod: PN

## 2019-12-23 NOTE — PROGRESS NOTES
Physical Therapy Daily Treatment Note     Name: Kathi Baker [place]  Clinic Number: [id number]    Therapy Diagnosis:   Encounter Diagnoses   Name Primary?    Neck pain     Lumbar pain      Physician: Clare Pan NP    Visit Date: 12/23/2019    Physician Orders: henny and tx  Medical Diagnosis: Diagnosis: M54.2 (ICD-10-CM) - Neck pain  Evaluation Date: 12/9/19  Authorization Period Expiration:   Plan of Care Certification Period: 3/9/20  Visit #/Visits authorized: 4/ 12      Time In: 200   Time Out: 245  Total Billable Time: 45 minutes    Precautions: none    Subjective     Pt reports: tension in shoulders.  She was compliant with home exercise program.  Response to previous treatment: no major changes  Functional change: no    Pain: 6/10  Location: bilateral neck      Objective     Kathi received therapeutic exercises to develop strength, endurance, ROM, flexibility, posture and core stabilization for 20 minutes including:  Cycling x 15 min  slouch correct x 10  scap retract x 10  Shoulder rolls x 20     NOT PERFORMED THIS SESSION  c retract x 2 x 20  abd stab 2 x 10  Pelvic tilt 2 x 20  SKC x 2  DKC x 2  Supine marching 2 x 20  BKF x 20      Kathi received the following manual therapy technique x 25 min: Myofacial release were applied to the: paraspinals parallel to the spine maurizio, maurizio UT/levator, Trp release  UT, levator/upper trap.  MET to to all planes of scapula maurizio   NOT PERFORMED THIS SESSION  Unsupervised modalities IFC to R shoulder/periscapula for pain modulation  x 15 min with MHP.  Call bell placed within reach and 5 min check to ensure comfort.       Home Exercises Provided and Patient Education Provided safety with transfers and gait, proper lifting technique, positioning for posture, pain managemtent    Written Home Exercises Provided: when appropriate.  Exercises were reviewed and Kathiwas able to demonstrate them prior to the end of the session.  Kathi demonstrated good   understanding of the education provided.     See EMR under Media for exercises provided; patient given handout with the exes stated above 2 x 15    Assessment   Pt felt relaxed after session and pleased with inc rom of neck and thoracic spine    Kathi is progressing well towards her goals.   Pt prognosis is Good.     Pt will continue to benefit from skilled outpatient physical therapy to address the deficits listed in the problem list box on initial evaluation, provide pt/family education and to maximize pt's level of independence in the home and community environment.     Pt's spiritual, cultural and educational needs considered and pt agreeable to plan of care and goals.    Anticipated barriers to physical therapy: none at this time    Goals: Short Term Goals (3 Weeks):  1. Pt will be compliant with HEP, safety with transfers, and be ind with pain management.   2. Pt will perform bending/lifting with good control to demonstrate improved core strength.  3. Pt will improve cervical ROM to </=minimal loss in all planes to improve functional mobility.  4. Pt will improve impaired LE MMTs to >/=4/5 to improve strength for functional tasks.  Long Term Goals (4-6Weeks):   1. Pt will improve Oswestry Neck Pain to 20% disability or less  3. Pt will improve sitting/standing tolerance for prolong period of time to complete a days work.  4. Pt will be able to return to prior activities to include amb community distances safely to shop for groceries.     Plan   Cont with SANDHYA Christensen, PT                                Physical Therapy Daily Treatment Note     Name: Kathi Baker [facility] Number: [id number]    Therapy Diagnosis:   No diagnosis found.  Physician: Clare Pan NP    Visit Date: 12/23/2019    Physician Orders: eval and tx  Medical Diagnosis: Diagnosis: M54.2 (ICD-10-CM) - Neck pain  Evaluation Date: 12/9/19  Authorization Period Expiration:   Plan of Care Certification Period: 3/9/20  Visit #/Visits  authorized: 6/ 12      Time In: 100   Time Out: 150  Total Billable Time: 50  minutes    Precautions: none    Subjective      Pt reports:no new complaints   She was compliant with home exercise program.  Response to previous treatment: no changes  Functional change: no    Pain: 4/10  Location: bilateral neck      Objective     Kathi received therapeutic exercises to develop strength, endurance, ROM, flexibility, posture and core stabilization for 10  minutes including:  Cycling x 20 min  slouch correct x 10  scap retract x 10  Shoulder rolls x 20   c retract x 2 x 20    abd stab 2 x 10  Pelvic tilt 2 x 20  SKC x 2  DKC x 2  Supine marching 2 x 20  BKF x 20    Kathi received the following manual therapy technique x 25 min: Myofacial release were applied to the: paraspinals parallel to the spine maurizio, maurizio UT/levator, Trp release  UT, levator/upper trap.  MET to to all planes of scapula maurizio     Unsupervised modalities IFC to R shoulder/periscapula for pain modulation  x 15 min with MHP.  Call bell placed within reach and 5 min check to ensure comfort.       Home Exercises Provided and Patient Education Provided safety with transfers and gait, proper lifting technique, positioning for posture, pain managemtent    Written Home Exercises Provided: when appropriate.  Exercises were reviewed and Kathiwas able to demonstrate them prior to the end of the session.  Kathi demonstrated good  understanding of the education provided.     See EMR under Media for exercises provided; patient given handout with the exes stated above 2 x 15    Assessment   Pt felt relaxed after session and pleased with inc rom of neck and thoracic spine    Kathi is progressing well towards her goals.   Pt prognosis is Good.     Pt will continue to benefit from skilled outpatient physical therapy to address the deficits listed in the problem list box on initial evaluation, provide pt/family education and to maximize pt's level of independence in the home  and community environment.     Pt's spiritual, cultural and educational needs considered and pt agreeable to plan of care and goals.    Anticipated barriers to physical therapy: none at this time    Goals: Short Term Goals (3 Weeks):  1. Pt will be compliant with HEP, safety with transfers, and be ind with pain management.   2. Pt will perform bending/lifting with good control to demonstrate improved core strength.  3. Pt will improve cervical ROM to </=minimal loss in all planes to improve functional mobility.  4. Pt will improve impaired LE MMTs to >/=4/5 to improve strength for functional tasks.  Long Term Goals (4-6Weeks):   1. Pt will improve Oswestry Neck Pain to 20% disability or less  3. Pt will improve sitting/standing tolerance for prolong period of time to complete a days work.  4. Pt will be able to return to prior activities to include amb community distances safely to shop for groceries.     Plan   Cont with POC      Nano Christensen, PT

## 2019-12-26 ENCOUNTER — CLINICAL SUPPORT (OUTPATIENT)
Dept: REHABILITATION | Facility: HOSPITAL | Age: 69
End: 2019-12-26
Payer: MEDICARE

## 2019-12-26 DIAGNOSIS — M54.50 LUMBAR PAIN: ICD-10-CM

## 2019-12-26 DIAGNOSIS — M54.2 NECK PAIN: Primary | ICD-10-CM

## 2019-12-26 PROCEDURE — 97110 THERAPEUTIC EXERCISES: CPT | Mod: PN

## 2019-12-26 PROCEDURE — 97010 HOT OR COLD PACKS THERAPY: CPT | Mod: PN

## 2019-12-26 PROCEDURE — 97014 ELECTRIC STIMULATION THERAPY: CPT | Mod: PN

## 2019-12-26 NOTE — PROGRESS NOTES
Physical Therapy Daily Treatment Note     Name: Kathi Baker [place]  Clinic Number: [id number]    Therapy Diagnosis:   Encounter Diagnoses   Name Primary?    Neck pain Yes    Lumbar pain      Physician: Clare Pan NP    Visit Date: 12/26/2019    Physician Orders: eval and tx  Medical Diagnosis: Diagnosis: M54.2 (ICD-10-CM) - Neck pain  Evaluation Date: 12/9/19  Authorization Period Expiration:   Plan of Care Certification Period: 3/9/20  Visit #/Visits authorized: 6 / 12      Time In: 1:00 PM   Time Out: 2:10 PM   Total Billable Time: 50  minutes    Precautions: none    Subjective      Pt reports:no new complaints   She was compliant with home exercise program.  Response to previous treatment: no changes  Functional change: no    Pain: 0 / 10  Location: bilateral neck      Objective     Kathi received therapeutic exercises to develop strength, endurance, ROM, flexibility, posture and core stabilization for 43  minutes including:  Recumbent Bike level 5 x 20 min  Slouch Correct x 15  3 Way Scapular Retraction x 15 with Black TB  Wall Slides x 3 mins  Shoulder Rolls x 20   Shoulder Shrugs x 15  Cervical Retraction x 20  Pelvic tilt  x 20  SKC x 10  DKC x 3 mins with SB  Supine marching 2 x 20      DNP  Kathi received the following manual therapy technique x 25 min: Myofacial release were applied to the: paraspinals parallel to the spine maurizio, maurizio UT/levator, Trp release  UT, levator/upper trap.  MET to to all planes of scapula maurizio       IFC to B shoulder/periscapula for pain modulation  x 20 min with MHP.        Home Exercises Provided and Patient Education Provided safety with transfers and gait, proper lifting technique, positioning for posture, pain managemtent    Written Home Exercises Provided: when appropriate.  Exercises were reviewed and [place] able to demonstrate them prior to the end of the session.  Kathi demonstrated good  understanding of the education provided.     See EMR  under Media for exercises provided; patient given handout with the exes stated above 2 x 15    Assessment   Pt tolerated treatment well.    Kathi is progressing well towards her goals.   Pt prognosis is Good.     Pt will continue to benefit from skilled outpatient physical therapy to address the deficits listed in the problem list box on initial evaluation, provide pt/family education and to maximize pt's level of independence in the home and community environment.     Pt's spiritual, cultural and educational needs considered and pt agreeable to plan of care and goals.    Anticipated barriers to physical therapy: none at this time    Goals: Short Term Goals (3 Weeks):  1. Pt will be compliant with HEP, safety with transfers, and be ind with pain management.   2. Pt will perform bending/lifting with good control to demonstrate improved core strength.  3. Pt will improve cervical ROM to </=minimal loss in all planes to improve functional mobility.  4. Pt will improve impaired LE MMTs to >/=4/5 to improve strength for functional tasks.  Long Term Goals (4-6Weeks):   1. Pt will improve Oswestry Neck Pain to 20% disability or less  3. Pt will improve sitting/standing tolerance for prolong period of time to complete a days work.  4. Pt will be able to return to prior activities to include amb community distances safely to shop for groceries.     Plan   Cont with POC      Nano Christensen, PT

## 2019-12-30 ENCOUNTER — CLINICAL SUPPORT (OUTPATIENT)
Dept: REHABILITATION | Facility: HOSPITAL | Age: 69
End: 2019-12-30
Payer: MEDICARE

## 2019-12-30 DIAGNOSIS — M54.50 LUMBAR PAIN: ICD-10-CM

## 2019-12-30 DIAGNOSIS — M54.2 NECK PAIN: ICD-10-CM

## 2019-12-30 PROCEDURE — 97140 MANUAL THERAPY 1/> REGIONS: CPT | Mod: PN

## 2019-12-30 PROCEDURE — 97110 THERAPEUTIC EXERCISES: CPT | Mod: PN

## 2019-12-30 PROCEDURE — 97010 HOT OR COLD PACKS THERAPY: CPT | Mod: PN

## 2019-12-31 NOTE — PROGRESS NOTES
Physical Therapy Daily Treatment Note     Name: Kathi Baker [place]  Clinic Number: [id number]    Therapy Diagnosis:   Encounter Diagnoses   Name Primary?    Neck pain     Lumbar pain      Physician: Clare Pan NP    Visit Date: 12/30/2019    Physician Orders: henny and tx  Medical Diagnosis: Diagnosis: M54.2 (ICD-10-CM) - Neck pain  Evaluation Date: 12/9/19  Authorization Period Expiration:   Plan of Care Certification Period: 3/9/20  Visit #/Visits authorized: 7 / 12      Time In: 2:00 PM   Time Out: 3:10 PM   Total Billable Time: 60  minutes    Precautions: none    Subjective      Pt reports:no new complaints   She was compliant with home exercise program.  Response to previous treatment: positive response to DN  Functional change: no    Pain: 0 / 10  Location: bilateral neck      Objective     Kathi received therapeutic exercises to develop strength, endurance, ROM, flexibility, posture and core stabilization for 40  minutes including:  Recumbent Bike level 5 x 20 min  Prone rows maurizio 2 x 10 w #1  Prone shoulder ext maurizio 2 x  #1  Shoulder Rolls x 20   Shoulder Shrugs x 15    NOT PERFORMED THIS SESSION  Slouch Correct x 15  3 Way Scapular Retraction x 15 with Black TB  Wall Slides x 3 mins  Shoulder Rolls x 20   Shoulder Shrugs x 15  Cervical Retraction x 20  Pelvic tilt  x 20  SKC x 10  DKC x 3 mins with SB  Supine marching 2 x 20  Prone rows maurizio 2 x 10 w #1  Prone shoulder ext maurizio 2 x  #1  Shoulder Rolls x 20   Shoulder Shrugs x 15        Kathi received the following manual therapy technique x 15 min: Myofacial release to trigger points were applied to the:scalens, sternoclemastoid maurizio      X 15 min MHP to c spine/scapula      Home Exercises Provided and Patient Education Provided safety with transfers and gait, proper lifting technique, positioning for posture, pain managemtent    Written Home Exercises Provided: when appropriate.  Exercises were reviewed and Jessicas able to  demonstrate them prior to the end of the session.  Kathi demonstrated good  understanding of the education provided.     See EMR under Media for exercises provided; patient given handout with the exes stated above 2 x 15    Assessment   Fatigue in post c spine mm from lying prone and using hand weight against gravity.     Kathi is progressing well towards her goals.   Pt prognosis is Good.     Pt will continue to benefit from skilled outpatient physical therapy to address the deficits listed in the problem list box on initial evaluation, provide pt/family education and to maximize pt's level of independence in the home and community environment.     Pt's spiritual, cultural and educational needs considered and pt agreeable to plan of care and goals.    Anticipated barriers to physical therapy: none at this time    Goals: Short Term Goals (3 Weeks):  1. Pt will be compliant with HEP, safety with transfers, and be ind with pain management.   2. Pt will perform bending/lifting with good control to demonstrate improved core strength.  3. Pt will improve cervical ROM to </=minimal loss in all planes to improve functional mobility.  4. Pt will improve impaired LE MMTs to >/=4/5 to improve strength for functional tasks.  Long Term Goals (4-6Weeks):   1. Pt will improve Oswestry Neck Pain to 20% disability or less  3. Pt will improve sitting/standing tolerance for prolong period of time to complete a days work.  4. Pt will be able to return to prior activities to include amb community distances safely to shop for groceries.     Plan   Cont with POC; Supervised face to face visit with Teto Narayan PTA to discuss, current level of function, POC and progress toward goals.       Nano Christensen, PT

## 2020-01-13 ENCOUNTER — OFFICE VISIT (OUTPATIENT)
Dept: SURGERY | Facility: CLINIC | Age: 70
End: 2020-01-13
Payer: MEDICARE

## 2020-01-13 VITALS
SYSTOLIC BLOOD PRESSURE: 116 MMHG | BODY MASS INDEX: 24.96 KG/M2 | HEIGHT: 67 IN | HEART RATE: 82 BPM | DIASTOLIC BLOOD PRESSURE: 80 MMHG | OXYGEN SATURATION: 97 % | TEMPERATURE: 99 F | RESPIRATION RATE: 12 BRPM | WEIGHT: 159 LBS

## 2020-01-13 DIAGNOSIS — R19.8 BORBORYGMI: ICD-10-CM

## 2020-01-13 DIAGNOSIS — R10.84 GENERALIZED ABDOMINAL PAIN: Primary | ICD-10-CM

## 2020-01-13 PROCEDURE — 1125F PR PAIN SEVERITY QUANTIFIED, PAIN PRESENT: ICD-10-PCS | Mod: S$GLB,,, | Performed by: SURGERY

## 2020-01-13 PROCEDURE — 1159F PR MEDICATION LIST DOCUMENTED IN MEDICAL RECORD: ICD-10-PCS | Mod: S$GLB,,, | Performed by: SURGERY

## 2020-01-13 PROCEDURE — 3079F DIAST BP 80-89 MM HG: CPT | Mod: CPTII,S$GLB,, | Performed by: SURGERY

## 2020-01-13 PROCEDURE — 1101F PR PT FALLS ASSESS DOC 0-1 FALLS W/OUT INJ PAST YR: ICD-10-PCS | Mod: CPTII,S$GLB,, | Performed by: SURGERY

## 2020-01-13 PROCEDURE — 1101F PT FALLS ASSESS-DOCD LE1/YR: CPT | Mod: CPTII,S$GLB,, | Performed by: SURGERY

## 2020-01-13 PROCEDURE — 1125F AMNT PAIN NOTED PAIN PRSNT: CPT | Mod: S$GLB,,, | Performed by: SURGERY

## 2020-01-13 PROCEDURE — 1159F MED LIST DOCD IN RCRD: CPT | Mod: S$GLB,,, | Performed by: SURGERY

## 2020-01-13 PROCEDURE — 3079F PR MOST RECENT DIASTOLIC BLOOD PRESSURE 80-89 MM HG: ICD-10-PCS | Mod: CPTII,S$GLB,, | Performed by: SURGERY

## 2020-01-13 PROCEDURE — 99214 PR OFFICE/OUTPT VISIT, EST, LEVL IV, 30-39 MIN: ICD-10-PCS | Mod: S$GLB,,, | Performed by: SURGERY

## 2020-01-13 PROCEDURE — 3074F SYST BP LT 130 MM HG: CPT | Mod: CPTII,S$GLB,, | Performed by: SURGERY

## 2020-01-13 PROCEDURE — 3074F PR MOST RECENT SYSTOLIC BLOOD PRESSURE < 130 MM HG: ICD-10-PCS | Mod: CPTII,S$GLB,, | Performed by: SURGERY

## 2020-01-13 PROCEDURE — 99214 OFFICE O/P EST MOD 30 MIN: CPT | Mod: S$GLB,,, | Performed by: SURGERY

## 2020-01-13 RX ORDER — GABAPENTIN 300 MG/1
300 CAPSULE ORAL DAILY
COMMUNITY
Start: 2019-03-29

## 2020-01-13 RX ORDER — LEVOTHYROXINE SODIUM 100 UG/1
TABLET ORAL
COMMUNITY
Start: 2019-10-07 | End: 2020-01-13

## 2020-01-13 NOTE — PROGRESS NOTES
Subjective:       Patient ID: Kathi Head     Chief Complaint:  Consult (Consult- [facility]- GERD)      HPI:  Ms. Head presents today with complaints of postprandial generalized abdominal pain and borborygmi.  Pain is associated nausea but no vomiting.  No fever or chills.  No diarrhea or constipation.  No melena or hematochezia.  No hematemesis.  No jaundice, biliuria, acholia.  No specific food intolerance.  No other aggravating factors.  No alleviating factors.  No other associated symptoms.  Poor historian.  Symptoms have been present for years, slowly increasing in severity.  No bloating.  No distention.      Allergies & Meds:  Review of patient's allergies indicates:  No Known Allergies    Current Outpatient Medications   Medication Sig Dispense Refill    gabapentin (NEURONTIN) 300 MG capsule       levothyroxine (SYNTHROID) 100 MCG tablet Take 1 tablet (100 mcg total) by mouth once daily. 90 tablet 0    magnesium oxide (MAG-OX) 400 mg (241.3 mg magnesium) tablet       omega 3-dha-epa-fish oil 1,000 mg (120 mg-180 mg) Cap       FLUZONE HIGH-DOSE 2019-20, PF, 180 mcg/0.5 mL Syrg       prenatal no122-iron-folic acid  mg-mcg Tab        No current facility-administered medications for this visit.        PMFSHx:  Past Medical History:   Diagnosis Date    Allergy     GERD (gastroesophageal reflux disease)     Hyperlipidemia     Neck pain 12/9/2019    Thyroid disease        Past Surgical History:   Procedure Laterality Date    APPENDECTOMY      COLONOSCOPY N/A 10/2/2018    Procedure: COLONOSCOPY;  Surgeon: Maci Sandoval MD;  Location: [facility];  Service: Endoscopy;  Laterality: N/A;    [facility]    HYSTERECTOMY      NASAL SEPTUM SURGERY      THYROID SURGERY         Family History   Problem Relation Age of Onset    Breast cancer Neg Hx     Eczema Neg Hx     Lupus Neg Hx     Psoriasis Neg Hx     Melanoma  Neg Hx        Social History     Tobacco Use    Smoking status: Never Smoker    Smokeless tobacco: Never Used   Substance Use Topics    Alcohol use: No    Drug use: No       Review of Systems   Constitutional: Negative for appetite change, chills, fatigue, fever and unexpected weight change.   HENT: Negative for congestion, dental problem, ear pain, mouth sores, postnasal drip, rhinorrhea, sore throat, tinnitus, trouble swallowing and voice change.    Eyes: Negative for photophobia, pain, discharge and visual disturbance.   Respiratory: Negative for cough, chest tightness, shortness of breath and wheezing.    Cardiovascular: Negative for chest pain, palpitations and leg swelling.   Gastrointestinal: Negative for abdominal pain, blood in stool, constipation, diarrhea, nausea and vomiting.   Endocrine: Negative for cold intolerance, heat intolerance, polydipsia, polyphagia and polyuria.   Genitourinary: Negative for difficulty urinating, dysuria, flank pain, frequency, hematuria and urgency.   Musculoskeletal: Negative for arthralgias, joint swelling and myalgias.   Skin: Negative for color change and rash.   Allergic/Immunologic: Negative for immunocompromised state.   Neurological: Negative for dizziness, tremors, seizures, syncope, speech difficulty, weakness, numbness and headaches.   Hematological: Negative for adenopathy. Does not bruise/bleed easily.   Psychiatric/Behavioral: Negative for agitation, confusion, hallucinations, self-injury and suicidal ideas. The patient is not nervous/anxious.             Physical Exam   Constitutional: She is oriented to person, place, and time. She appears well-developed and well-nourished. She is active.  Non-toxic appearance. No distress.   Body mass index is 24.9 kg/m².     HENT:   Head: Normocephalic and atraumatic.   Right Ear: Hearing and external ear normal. No drainage or tenderness.   Left Ear: Hearing and external ear normal. No drainage or tenderness.   Nose:  Nose normal. No rhinorrhea. No epistaxis.   Mouth/Throat: Uvula is midline, oropharynx is clear and moist and mucous membranes are normal. Mucous membranes are not pale, not dry and not cyanotic. No oropharyngeal exudate.   Eyes: Pupils are equal, round, and reactive to light. Conjunctivae and lids are normal. Right eye exhibits no discharge and no exudate. Left eye exhibits no discharge and no exudate. Right conjunctiva is not injected. Right eye exhibits no nystagmus. Left eye exhibits no nystagmus.   Neck: Trachea normal, full passive range of motion without pain and phonation normal. No JVD present. Carotid bruit is not present. No tracheal deviation present. No thyroid mass and no thyromegaly present.   Cardiovascular: Normal rate, regular rhythm, S1 normal, S2 normal, normal heart sounds and intact distal pulses. PMI is not displaced. Exam reveals no gallop and no friction rub.   No murmur heard.  Pulmonary/Chest: Effort normal and breath sounds normal. No accessory muscle usage. No respiratory distress. She exhibits no mass, no tenderness and no crepitus. Right breast exhibits no inverted nipple, no mass, no nipple discharge, no skin change and no tenderness. Left breast exhibits no inverted nipple, no mass, no nipple discharge, no skin change and no tenderness. Breasts are symmetrical.   Abdominal: Soft. Normal appearance and bowel sounds are normal. She exhibits no distension, no fluid wave, no abdominal bruit and no mass. There is no hepatosplenomegaly. There is no tenderness. There is no rebound, no guarding and negative Dillard's sign. No hernia. Hernia confirmed negative in the right inguinal area and confirmed negative in the left inguinal area.   Genitourinary: Rectum normal and vagina normal. There is no rash or lesion on the right labia. There is no rash or lesion on the left labia. Right adnexum displays no mass. Left adnexum displays no mass. No vaginal discharge found.   Musculoskeletal:         Cervical back: Normal.        Thoracic back: Normal.        Lumbar back: Normal.        Right upper arm: Normal.        Left upper arm: Normal.        Right forearm: Normal.        Left forearm: Normal.        Right hand: Normal.        Left hand: Normal.        Right upper leg: Normal.        Left upper leg: Normal.        Right lower leg: Normal.        Left lower leg: Normal.        Right foot: Normal.        Left foot: Normal.   Lymphadenopathy:        Head (right side): No submental, no submandibular and no posterior auricular adenopathy present.        Head (left side): No submental, no submandibular and no posterior auricular adenopathy present.     She has no cervical adenopathy.     She has no axillary adenopathy.        Right: No inguinal and no supraclavicular adenopathy present.        Left: No inguinal and no supraclavicular adenopathy present.   Neurological: She is alert and oriented to person, place, and time. She has normal strength. No cranial nerve deficit or sensory deficit. Coordination and gait normal. GCS eye subscore is 4. GCS verbal subscore is 5. GCS motor subscore is 6.   Skin: Skin is warm, dry and intact. No rash noted. No cyanosis. Nails show no clubbing.   Psychiatric: She has a normal mood and affect. Her speech is normal. Judgment and thought content normal. Her mood appears not anxious. Her affect is not inappropriate. She is not actively hallucinating. She does not exhibit a depressed mood.       Medical Records:  CT scan of the abdomen pelvis films and report reviewed from 9/18/2018, study was performed with contrast.  Findings included mild unexplained right-sided hydronephrosis and postoperative changes consistent with hysterectomy.    Lab results reviewed from 9/18/2018.  CBC showed no evidence of leukocytosis, anemia, or thrombocytopenia.  Electrolytes revealed a mild hypocalcemia, otherwise all electrolytes were in the range of normal.  BUN and creatinine showed no evidence  of renal dysfunction.  Glucose showed no suspicion diabetes.  Liver profile showed no evidence of hepatocellular disease or biliary obstruction.  TSH and free T4 indicates she is euthyroid.    H pylori stool antigen negative on 9/15/2018.          Assessment:       Generalized abdominal pain.  Borborygmi.  Generalized abdominal pain. Differential diagnosis includes but is not limited to reflux disease, esophagitis, ulcer disease, gastritis, ampullary dysfunction, biliary tract disease, inflammatory bowel disease, irritable bowel syndrome, diverticulosis, constipation, gastrointestinal neoplasm, etc.  Options at this time include but are not limited to endoscopy, radiologic studies, empiric therapy, symptomatic therapy, second opinion, observation, laparoscopy, etc.      1. Generalized abdominal pain    2. Borborygmi          Plan:   Generalized abdominal pain  -     US Abdomen Limited; Future; Expected date: 01/13/2020    Borborygmi        Follow up in about 2 weeks (around 1/27/2020) for results.    Counseling/Medical Decision Making:   Ms. Head was counseled regarding the results of her evaluation thus far.  Differential diagnosis discussed included was out limited to gastritis, ulcer disease, duodenitis, cholelithiasis, cholecystitis, gallbladder dyskinesia, ampullary dysfunction, inflammatory bowel disease, gastrointestinal neoplasms, etc.  Diagnostic options discussed included not limited to abdominal ultrasound, second opinion, repeat CT scan, laparoscopy, endoscopy, etc.  Recommendations were expressed to initiate evaluation with a ultrasound followed by a HIDA scan if the ultrasound is nondiagnostic.  Thereafter endoscopy may be necessary/warranted.  Questions solicited and answered.  She voiced understanding.  She voiced satisfaction all questions were answered.  She agrees to proceed with ultrasound and possible HIDA scan.    Total face-to-face encounter time was 30 minutes with 15 minutes spent  counseling the patient as outlined/summarized above.

## 2020-01-20 ENCOUNTER — HOSPITAL ENCOUNTER (OUTPATIENT)
Dept: RADIOLOGY | Facility: HOSPITAL | Age: 70
Discharge: HOME | End: 2020-01-20
Attending: SURGERY
Payer: MEDICARE

## 2020-01-20 DIAGNOSIS — R10.84 GENERALIZED ABDOMINAL PAIN: ICD-10-CM

## 2020-01-20 PROCEDURE — 76705 US ABDOMEN LIMITED: ICD-10-PCS | Mod: 26,,, | Performed by: RADIOLOGY

## 2020-01-20 PROCEDURE — 76705 ECHO EXAM OF ABDOMEN: CPT | Mod: TC,PN

## 2020-01-20 PROCEDURE — 76705 ECHO EXAM OF ABDOMEN: CPT | Mod: 26,,, | Performed by: RADIOLOGY

## 2020-01-21 ENCOUNTER — TELEPHONE (OUTPATIENT)
Dept: SURGERY | Facility: CLINIC | Age: 70
End: 2020-01-21

## 2020-01-21 NOTE — TELEPHONE ENCOUNTER
----- Message from Brianne Alvarez sent at 1/21/2020 10:15 AM CST -----  Contact: Patient  Type:  Sooner Apoointment Request    Caller is requesting a sooner appointment.  Caller declined first available appointment listed below.  Caller will not accept being placed on the waitlist and is requesting a message be sent to doctor.    Name of Caller:  patient  When is the first available appointment?  07/27/2020  Symptoms:  Stomach Aches, Bloating  Best Call Back Number:  [phone] (home)   Additional Information:  The pt said she would like a sooner appt date please call her to advise

## 2020-01-21 NOTE — TELEPHONE ENCOUNTER
Returned pt's phone call. Advised pt that I do not have any openings at this time, but if there are any cancellations on Thursday, I will give her a call to get her in sooner. Pt verbalizes understanding.

## 2020-01-27 ENCOUNTER — OFFICE VISIT (OUTPATIENT)
Dept: SURGERY | Facility: CLINIC | Age: 70
End: 2020-01-27
Payer: MEDICARE

## 2020-01-27 VITALS
WEIGHT: 162 LBS | OXYGEN SATURATION: 99 % | DIASTOLIC BLOOD PRESSURE: 70 MMHG | HEIGHT: 67 IN | BODY MASS INDEX: 25.43 KG/M2 | HEART RATE: 83 BPM | TEMPERATURE: 98 F | SYSTOLIC BLOOD PRESSURE: 96 MMHG | RESPIRATION RATE: 12 BRPM

## 2020-01-27 DIAGNOSIS — R19.5 HEME POSITIVE STOOL: Primary | ICD-10-CM

## 2020-01-27 DIAGNOSIS — R19.8 BORBORYGMI: ICD-10-CM

## 2020-01-27 DIAGNOSIS — R10.84 GENERALIZED ABDOMINAL PAIN: ICD-10-CM

## 2020-01-27 PROCEDURE — 3074F SYST BP LT 130 MM HG: CPT | Mod: CPTII,S$GLB,, | Performed by: SURGERY

## 2020-01-27 PROCEDURE — 1101F PR PT FALLS ASSESS DOC 0-1 FALLS W/OUT INJ PAST YR: ICD-10-PCS | Mod: CPTII,S$GLB,, | Performed by: SURGERY

## 2020-01-27 PROCEDURE — 3074F PR MOST RECENT SYSTOLIC BLOOD PRESSURE < 130 MM HG: ICD-10-PCS | Mod: CPTII,S$GLB,, | Performed by: SURGERY

## 2020-01-27 PROCEDURE — 1101F PT FALLS ASSESS-DOCD LE1/YR: CPT | Mod: CPTII,S$GLB,, | Performed by: SURGERY

## 2020-01-27 PROCEDURE — 1159F MED LIST DOCD IN RCRD: CPT | Mod: S$GLB,,, | Performed by: SURGERY

## 2020-01-27 PROCEDURE — 3078F DIAST BP <80 MM HG: CPT | Mod: CPTII,S$GLB,, | Performed by: SURGERY

## 2020-01-27 PROCEDURE — 99215 OFFICE O/P EST HI 40 MIN: CPT | Mod: S$GLB,,, | Performed by: SURGERY

## 2020-01-27 PROCEDURE — 99215 PR OFFICE/OUTPT VISIT, EST, LEVL V, 40-54 MIN: ICD-10-PCS | Mod: S$GLB,,, | Performed by: SURGERY

## 2020-01-27 PROCEDURE — 3078F PR MOST RECENT DIASTOLIC BLOOD PRESSURE < 80 MM HG: ICD-10-PCS | Mod: CPTII,S$GLB,, | Performed by: SURGERY

## 2020-01-27 PROCEDURE — 1159F PR MEDICATION LIST DOCUMENTED IN MEDICAL RECORD: ICD-10-PCS | Mod: S$GLB,,, | Performed by: SURGERY

## 2020-01-27 PROCEDURE — 1126F AMNT PAIN NOTED NONE PRSNT: CPT | Mod: S$GLB,,, | Performed by: SURGERY

## 2020-01-27 PROCEDURE — 1126F PR PAIN SEVERITY QUANTIFIED, NO PAIN PRESENT: ICD-10-PCS | Mod: S$GLB,,, | Performed by: SURGERY

## 2020-01-27 RX ORDER — SODIUM CHLORIDE, SODIUM LACTATE, POTASSIUM CHLORIDE, CALCIUM CHLORIDE 600; 310; 30; 20 MG/100ML; MG/100ML; MG/100ML; MG/100ML
INJECTION, SOLUTION INTRAVENOUS CONTINUOUS
Status: CANCELLED | OUTPATIENT
Start: 2020-01-27

## 2020-01-27 RX ORDER — LIDOCAINE HYDROCHLORIDE 10 MG/ML
1 INJECTION, SOLUTION EPIDURAL; INFILTRATION; INTRACAUDAL; PERINEURAL ONCE
Status: CANCELLED | OUTPATIENT
Start: 2020-01-27 | End: 2020-01-27

## 2020-01-27 RX ORDER — SODIUM, POTASSIUM,MAG SULFATES 17.5-3.13G
SOLUTION, RECONSTITUTED, ORAL ORAL
Qty: 2 BOTTLE | Refills: 0 | Status: ON HOLD | OUTPATIENT
Start: 2020-01-27 | End: 2020-02-21 | Stop reason: HOSPADM

## 2020-01-27 NOTE — H&P (VIEW-ONLY)
[facility] - General Surgery  General Surgery  H&P      Patient Name: Kathi Head  MRN: [id number]     Chief Complaint:  I am here to be scoped    HPI:  Ms. Head returns today to review ultrasound results.  She is still experiencing generalized abdominal discomfort/tenderness, bloating, and borborygmi.  Long history of reflux disease.  In the interval since her last visit she had a gallbladder ultrasound that shows no evidence of cholelithiasis, cholecystitis, choledocholithiasis, or dilated bile ducts.  She has numerous other complaints.  Her predominant complaint is sinus mucus drainage that is causing hoarseness, teeth rot, and gum rot.  She was also concerned that the mucous is producing her abdominal symptoms.  She is followed by a ENT provider and an allergist with numerous therapies instituted for this complaint.  She is not experiencing any nausea, vomiting, diarrhea, constipation, melena, hematochezia, hematemesis, weight loss, fevers, chills, night sweats, etc.  No other associated symptoms.  No triggering issues.  No aggravating factors.  No alleviating factors.  She is unable to comply with lifestyle modifications for reflux disease. She cannot sleep with her head elevated because it causes headaches and neck pain.      Allergies & Meds:    No Known Allergies    Current Outpatient Medications   Medication Sig Dispense Refill    FLUZONE HIGH-DOSE 2019-20, PF, 180 mcg/0.5 mL Syrg       gabapentin (NEURONTIN) 300 MG capsule       levothyroxine (SYNTHROID) 100 MCG tablet Take 1 tablet (100 mcg total) by mouth once daily. 90 tablet 0    magnesium oxide (MAG-OX) 400 mg (241.3 mg magnesium) tablet       omega 3-dha-epa-fish oil 1,000 mg (120 mg-180 mg) Cap       prenatal no122-iron-folic acid  mg-mcg Tab       sodium,potassium,mag sulfates (SUPREP BOWEL PREP KIT) 17.5-3.13-1.6 gram SolR Follow written instructions provided by Clinic 2 Bottle 0          PMFSHx:  Past Medical History:   Diagnosis Date    Allergy     GERD (gastroesophageal reflux disease)     Hyperlipidemia     Neck pain 12/9/2019    Thyroid disease        Past Surgical History:   Procedure Laterality Date    APPENDECTOMY      COLONOSCOPY N/A 10/2/2018    Procedure: COLONOSCOPY;  Surgeon: Maci Sandoval MD;  Location: [facility];  Service: Endoscopy;  Laterality: N/A;    [facility]AGOGA[facility]  [facility]    HYSTERECTOMY      NASAL SEPTUM SURGERY      THYROID SURGERY         Family History   Problem Relation Age of Onset    Breast cancer Neg Hx     Eczema Neg Hx     Lupus Neg Hx     Psoriasis Neg Hx     Melanoma Neg Hx        Social History     Tobacco Use    Smoking status: Never Smoker    Smokeless tobacco: Never Used   Substance Use Topics    Alcohol use: No    Drug use: No       Review of Systems   Constitutional: Negative for appetite change, chills, fatigue, fever and unexpected weight change.   HENT: Negative for congestion, dental problem, ear pain, mouth sores, postnasal drip, rhinorrhea, sore throat, tinnitus, trouble swallowing and voice change.    Eyes: Negative for photophobia, pain, discharge and visual disturbance.   Respiratory: Negative for cough, chest tightness, shortness of breath and wheezing.    Cardiovascular: Negative for chest pain, palpitations and leg swelling.   Gastrointestinal: Negative for blood in stool, constipation, diarrhea, nausea and vomiting.   Endocrine: Negative for cold intolerance, heat intolerance, polydipsia, polyphagia and polyuria.   Genitourinary: Negative for difficulty urinating, dysuria, flank pain, frequency, hematuria and urgency.   Musculoskeletal: Negative for arthralgias, joint swelling and myalgias.   Skin: Negative for color change and rash.   Allergic/Immunologic: Negative for immunocompromised state.   Neurological: Negative for dizziness, tremors, seizures,  syncope, speech difficulty, weakness, numbness and headaches.   Hematological: Negative for adenopathy. Does not bruise/bleed easily.   Psychiatric/Behavioral: Negative for agitation, confusion, hallucinations, self-injury and suicidal ideas. The patient is not nervous/anxious.             Physical Exam   Constitutional: She is oriented to person, place, and time. She appears well-developed and well-nourished. She is active.  Non-toxic appearance. No distress. Body mass index is 25.37 kg/m².   HENT: Head: Normocephalic and atraumatic.   Right Ear: Hearing and external ear normal. No drainage or tenderness.   Left Ear: Hearing and external ear normal. No drainage or tenderness.   Nose: Nose normal. No rhinorrhea. No epistaxis.   Mouth/Throat: Uvula is midline, oropharynx is clear and moist and mucous membranes are normal. Mucous membranes are not pale, not dry and not cyanotic. No oropharyngeal exudate.   Eyes: Pupils are equal, round, and reactive to light. Conjunctivae and lids are normal. Right eye exhibits no discharge and no exudate. Left eye exhibits no discharge and no exudate. Right conjunctiva is not injected. Right eye exhibits no nystagmus. Left eye exhibits no nystagmus.   Neck: Trachea normal, full passive range of motion without pain and phonation normal. No JVD present. Carotid bruit is not present. No tracheal deviation present. No thyroid mass and no thyromegaly present.   Cardiovascular: Normal rate, regular rhythm, S1 normal, S2 normal, normal heart sounds and intact distal pulses. PMI is not displaced. Exam reveals no gallop and no friction rub.   No murmur heard.  Pulmonary/Chest: Effort normal and breath sounds normal. No accessory muscle usage. No respiratory distress. She exhibits no mass, no tenderness and no crepitus. Right breast exhibits no inverted nipple, no mass, no nipple discharge, no skin change and no tenderness. Left breast exhibits no inverted nipple, no mass, no nipple discharge,  no skin change and no tenderness. Breasts are symmetrical.   Abdominal: Soft. Normal appearance and bowel sounds are normal. She exhibits no distension, no fluid wave, no abdominal bruit and no mass. There is no hepatosplenomegaly. There is no tenderness. There is no rebound, no guarding and negative Dillard's sign. No hernia. Hernia confirmed negative in the right inguinal area and confirmed negative in the left inguinal area.   Genitourinary: Vagina normal. Rectal exam shows guaiac positive stool. Rectal exam shows no external hemorrhoid, no internal hemorrhoid, no fissure, no mass, no tenderness and anal tone normal. There is no rash or lesion on the right labia. There is no rash or lesion on the left labia. Right adnexum displays no mass. Left adnexum displays no mass. No vaginal discharge found.   Musculoskeletal:        Cervical back: Normal.        Thoracic back: Normal.        Lumbar back: Normal.        Right upper arm: Normal.        Left upper arm: Normal.        Right forearm: Normal.        Left forearm: Normal.        Right hand: Normal.        Left hand: Normal.        Right upper leg: Normal.        Left upper leg: Normal.        Right lower leg: Normal.        Left lower leg: Normal.        Right foot: Normal.        Left foot: Normal.   Lymphadenopathy:        Head (right side): No submental, no submandibular and no posterior auricular adenopathy present.        Head (left side): No submental, no submandibular and no posterior auricular adenopathy present.     She has no cervical adenopathy.     She has no axillary adenopathy.        Right: No inguinal and no supraclavicular adenopathy present.        Left: No inguinal and no supraclavicular adenopathy present.   Neurological: She is alert and oriented to person, place, and time. She has normal strength. No cranial nerve deficit or sensory deficit. Coordination and gait normal. GCS eye subscore is 4. GCS verbal subscore is 5. GCS motor subscore is  6.   Skin: Skin is warm, dry and intact. No rash noted. No cyanosis. Nails show no clubbing.   Psychiatric: She has a normal mood and affect. Her speech is normal. Judgment and thought content normal. Her mood appears not anxious. Her affect is not inappropriate. She is not actively hallucinating. She does not exhibit a depressed mood.           Medical Records Review:  Gallbladder ultrasound films and report reviewed from 1/20/2020 with the above summarized findings noted.        Assessment:       Heme positive stool.  Generalized abdominal pain.  Borborygmi.  Differential diagnosis includes but is not limited to esophageal neoplasm, esophagitis, esophageal ulcer, gastroesophageal reflux disease, gastritis, gastric ulcer, gastric cancer, duodenitis, duodenal ulcer, inflammatory bowel disease, diverticulosis, hemorrhoids, gastrointestinal angiodysplasias, benign gastrointestinal neoplasm, colon cancer, etc.  Options at this time include but are not limited to endoscopy, second opinion, capsule endoscopy, radiologic studies, observation, etc.  Multiple comorbid issues ( chronic pain, hypothyroidism ) increase the complexity of required perioperative evaluation & management, risks of complications, and likely will increase the difficulty and complexity of postoperative care, etc.  These issues must be factored in to preoperative evaluation and perioperative management.      1. Heme positive stool    2. Borborygmi    3. Generalized abdominal pain          Plan:   Heme positive stool  -     Case Request Operating Room: COLONOSCOPY, ESOPHAGOGASTRODUODENOSCOPY (EGD)  -     Comprehensive metabolic panel; Future; Expected date: 01/27/2020  -     CBC auto differential; Future; Expected date: 01/27/2020  -     EKG 12-lead; Future; Expected date: 01/27/2020    Borborygmi    Generalized abdominal pain    Other orders  -     sodium,potassium,mag sulfates (SUPREP BOWEL PREP KIT) 17.5-3.13-1.6 gram SolR; Follow written  instructions provided by Clinic  Dispense: 2 Bottle; Refill: 0        Follow up iaround 3/9/2020 for routine post-endosocpy visit.    Counseling/Medical Decision Making:  Kathi Head was counseled regarding the results of the evaluation thus far and the differential diagnosis.  Diagnostic and therapeutic options were discussed in detail along with the risks, benefits, possible complications, details of, and indications for each option.  Diagnoses discussed included but were not limited to: GB disease, GERD, hiatal hernia, PUD, gastritis, duodenitis, Sphincter of Oddi dysfunction, hemorrhoids, diverticulosis, cancer, IBD, IBS, benign tumors, angiodysplasias, ischemic disease.  Options discussed included but were not limited to: EGD, colonoscopy, proctoscopy, anoscopy, sigmoidoscopy, radiologic studies, PillCam, empiric therapy, second opinion, etc. Possible complications of endoscopy discussed included but were not limited to: bleeding, infections, endocarditis, injury to internal organs, incomplete exam, failure to diagnose cancer or other serious condition, need for emergency surgery, need for a temporary colostomy, need for additional operations or procedures, etc.  Entire conversation was held in layman's terms.  All unfamiliar terms were defined.  Questions solicited and answered.  I read the consent form to her verbatim.  Krames booklets were provided on EGD, GERD, GB disease / surgery, colonoscopy, polyps, diverticulosis, hemorrhoids, cancer, etc.  A copy of the consent form was provided for her review outside the office / hospital prior to the procedures.  At the conclusion of the conversation she voiced complete understanding of all we discussed and satisfaction that all of her questions had been answered to her full understanding.  She stated that she felt fully informed and completely capable of making an informed decision.  She requests and desires to proceed with EGD and colonoscopy.

## 2020-01-27 NOTE — H&P
[facility] - General Surgery  General Surgery  H&P      Patient Name: Kathi Head  MRN: [id number]     Chief Complaint:  I am here to be scoped    HPI:  Ms. Head returns today to review ultrasound results.  She is still experiencing generalized abdominal discomfort/tenderness, bloating, and borborygmi.  Long history of reflux disease.  In the interval since her last visit she had a gallbladder ultrasound that shows no evidence of cholelithiasis, cholecystitis, choledocholithiasis, or dilated bile ducts.  She has numerous other complaints.  Her predominant complaint is sinus mucus drainage that is causing hoarseness, teeth rot, and gum rot.  She was also concerned that the mucous is producing her abdominal symptoms.  She is followed by a ENT provider and an allergist with numerous therapies instituted for this complaint.  She is not experiencing any nausea, vomiting, diarrhea, constipation, melena, hematochezia, hematemesis, weight loss, fevers, chills, night sweats, etc.  No other associated symptoms.  No triggering issues.  No aggravating factors.  No alleviating factors.  She is unable to comply with lifestyle modifications for reflux disease. She cannot sleep with her head elevated because it causes headaches and neck pain.      Allergies & Meds:    No Known Allergies    Current Outpatient Medications   Medication Sig Dispense Refill    FLUZONE HIGH-DOSE 2019-20, PF, 180 mcg/0.5 mL Syrg       gabapentin (NEURONTIN) 300 MG capsule       levothyroxine (SYNTHROID) 100 MCG tablet Take 1 tablet (100 mcg total) by mouth once daily. 90 tablet 0    magnesium oxide (MAG-OX) 400 mg (241.3 mg magnesium) tablet       omega 3-dha-epa-fish oil 1,000 mg (120 mg-180 mg) Cap       prenatal no122-iron-folic acid  mg-mcg Tab       sodium,potassium,mag sulfates (SUPREP BOWEL PREP KIT) 17.5-3.13-1.6 gram SolR Follow written instructions provided by Clinic 2 Bottle 0          PMFSHx:  Past Medical History:   Diagnosis Date    Allergy     GERD (gastroesophageal reflux disease)     Hyperlipidemia     Neck pain 12/9/2019    Thyroid disease        Past Surgical History:   Procedure Laterality Date    APPENDECTOMY      COLONOSCOPY N/A 10/2/2018    Procedure: COLONOSCOPY;  Surgeon: Maci Sandoval MD;  Location: [facility];  Service: Endoscopy;  Laterality: N/A;    [facility]    HYSTERECTOMY      NASAL SEPTUM SURGERY      THYROID SURGERY         Family History   Problem Relation Age of Onset    Breast cancer Neg Hx     Eczema Neg Hx     Lupus Neg Hx     Psoriasis Neg Hx     Melanoma Neg Hx        Social History     Tobacco Use    Smoking status: Never Smoker    Smokeless tobacco: Never Used   Substance Use Topics    Alcohol use: No    Drug use: No       Review of Systems   Constitutional: Negative for appetite change, chills, fatigue, fever and unexpected weight change.   HENT: Negative for congestion, dental problem, ear pain, mouth sores, postnasal drip, rhinorrhea, sore throat, tinnitus, trouble swallowing and voice change.    Eyes: Negative for photophobia, pain, discharge and visual disturbance.   Respiratory: Negative for cough, chest tightness, shortness of breath and wheezing.    Cardiovascular: Negative for chest pain, palpitations and leg swelling.   Gastrointestinal: Negative for blood in stool, constipation, diarrhea, nausea and vomiting.   Endocrine: Negative for cold intolerance, heat intolerance, polydipsia, polyphagia and polyuria.   Genitourinary: Negative for difficulty urinating, dysuria, flank pain, frequency, hematuria and urgency.   Musculoskeletal: Negative for arthralgias, joint swelling and myalgias.   Skin: Negative for color change and rash.   Allergic/Immunologic: Negative for immunocompromised state.   Neurological: Negative for dizziness, tremors, seizures,  syncope, speech difficulty, weakness, numbness and headaches.   Hematological: Negative for adenopathy. Does not bruise/bleed easily.   Psychiatric/Behavioral: Negative for agitation, confusion, hallucinations, self-injury and suicidal ideas. The patient is not nervous/anxious.             Physical Exam   Constitutional: She is oriented to person, place, and time. She appears well-developed and well-nourished. She is active.  Non-toxic appearance. No distress. Body mass index is 25.37 kg/m².   HENT: Head: Normocephalic and atraumatic.   Right Ear: Hearing and external ear normal. No drainage or tenderness.   Left Ear: Hearing and external ear normal. No drainage or tenderness.   Nose: Nose normal. No rhinorrhea. No epistaxis.   Mouth/Throat: Uvula is midline, oropharynx is clear and moist and mucous membranes are normal. Mucous membranes are not pale, not dry and not cyanotic. No oropharyngeal exudate.   Eyes: Pupils are equal, round, and reactive to light. Conjunctivae and lids are normal. Right eye exhibits no discharge and no exudate. Left eye exhibits no discharge and no exudate. Right conjunctiva is not injected. Right eye exhibits no nystagmus. Left eye exhibits no nystagmus.   Neck: Trachea normal, full passive range of motion without pain and phonation normal. No JVD present. Carotid bruit is not present. No tracheal deviation present. No thyroid mass and no thyromegaly present.   Cardiovascular: Normal rate, regular rhythm, S1 normal, S2 normal, normal heart sounds and intact distal pulses. PMI is not displaced. Exam reveals no gallop and no friction rub.   No murmur heard.  Pulmonary/Chest: Effort normal and breath sounds normal. No accessory muscle usage. No respiratory distress. She exhibits no mass, no tenderness and no crepitus. Right breast exhibits no inverted nipple, no mass, no nipple discharge, no skin change and no tenderness. Left breast exhibits no inverted nipple, no mass, no nipple discharge,  no skin change and no tenderness. Breasts are symmetrical.   Abdominal: Soft. Normal appearance and bowel sounds are normal. She exhibits no distension, no fluid wave, no abdominal bruit and no mass. There is no hepatosplenomegaly. There is no tenderness. There is no rebound, no guarding and negative Dillard's sign. No hernia. Hernia confirmed negative in the right inguinal area and confirmed negative in the left inguinal area.   Genitourinary: Vagina normal. Rectal exam shows guaiac positive stool. Rectal exam shows no external hemorrhoid, no internal hemorrhoid, no fissure, no mass, no tenderness and anal tone normal. There is no rash or lesion on the right labia. There is no rash or lesion on the left labia. Right adnexum displays no mass. Left adnexum displays no mass. No vaginal discharge found.   Musculoskeletal:        Cervical back: Normal.        Thoracic back: Normal.        Lumbar back: Normal.        Right upper arm: Normal.        Left upper arm: Normal.        Right forearm: Normal.        Left forearm: Normal.        Right hand: Normal.        Left hand: Normal.        Right upper leg: Normal.        Left upper leg: Normal.        Right lower leg: Normal.        Left lower leg: Normal.        Right foot: Normal.        Left foot: Normal.   Lymphadenopathy:        Head (right side): No submental, no submandibular and no posterior auricular adenopathy present.        Head (left side): No submental, no submandibular and no posterior auricular adenopathy present.     She has no cervical adenopathy.     She has no axillary adenopathy.        Right: No inguinal and no supraclavicular adenopathy present.        Left: No inguinal and no supraclavicular adenopathy present.   Neurological: She is alert and oriented to person, place, and time. She has normal strength. No cranial nerve deficit or sensory deficit. Coordination and gait normal. GCS eye subscore is 4. GCS verbal subscore is 5. GCS motor subscore is  6.   Skin: Skin is warm, dry and intact. No rash noted. No cyanosis. Nails show no clubbing.   Psychiatric: She has a normal mood and affect. Her speech is normal. Judgment and thought content normal. Her mood appears not anxious. Her affect is not inappropriate. She is not actively hallucinating. She does not exhibit a depressed mood.           Medical Records Review:  Gallbladder ultrasound films and report reviewed from 1/20/2020 with the above summarized findings noted.        Assessment:       Heme positive stool.  Generalized abdominal pain.  Borborygmi.  Differential diagnosis includes but is not limited to esophageal neoplasm, esophagitis, esophageal ulcer, gastroesophageal reflux disease, gastritis, gastric ulcer, gastric cancer, duodenitis, duodenal ulcer, inflammatory bowel disease, diverticulosis, hemorrhoids, gastrointestinal angiodysplasias, benign gastrointestinal neoplasm, colon cancer, etc.  Options at this time include but are not limited to endoscopy, second opinion, capsule endoscopy, radiologic studies, observation, etc.  Multiple comorbid issues ( chronic pain, hypothyroidism ) increase the complexity of required perioperative evaluation & management, risks of complications, and likely will increase the difficulty and complexity of postoperative care, etc.  These issues must be factored in to preoperative evaluation and perioperative management.      1. Heme positive stool    2. Borborygmi    3. Generalized abdominal pain          Plan:   Heme positive stool  -     Case Request Operating Room: COLONOSCOPY, ESOPHAGOGASTRODUODENOSCOPY (EGD)  -     Comprehensive metabolic panel; Future; Expected date: 01/27/2020  -     CBC auto differential; Future; Expected date: 01/27/2020  -     EKG 12-lead; Future; Expected date: 01/27/2020    Borborygmi    Generalized abdominal pain    Other orders  -     sodium,potassium,mag sulfates (SUPREP BOWEL PREP KIT) 17.5-3.13-1.6 gram SolR; Follow written  instructions provided by Clinic  Dispense: 2 Bottle; Refill: 0        Follow up iaround 3/9/2020 for routine post-endosocpy visit.    Counseling/Medical Decision Making:  Kathi Head was counseled regarding the results of the evaluation thus far and the differential diagnosis.  Diagnostic and therapeutic options were discussed in detail along with the risks, benefits, possible complications, details of, and indications for each option.  Diagnoses discussed included but were not limited to: GB disease, GERD, hiatal hernia, PUD, gastritis, duodenitis, Sphincter of Oddi dysfunction, hemorrhoids, diverticulosis, cancer, IBD, IBS, benign tumors, angiodysplasias, ischemic disease.  Options discussed included but were not limited to: EGD, colonoscopy, proctoscopy, anoscopy, sigmoidoscopy, radiologic studies, PillCam, empiric therapy, second opinion, etc. Possible complications of endoscopy discussed included but were not limited to: bleeding, infections, endocarditis, injury to internal organs, incomplete exam, failure to diagnose cancer or other serious condition, need for emergency surgery, need for a temporary colostomy, need for additional operations or procedures, etc.  Entire conversation was held in layman's terms.  All unfamiliar terms were defined.  Questions solicited and answered.  I read the consent form to her verbatim.  Krames booklets were provided on EGD, GERD, GB disease / surgery, colonoscopy, polyps, diverticulosis, hemorrhoids, cancer, etc.  A copy of the consent form was provided for her review outside the office / hospital prior to the procedures.  At the conclusion of the conversation she voiced complete understanding of all we discussed and satisfaction that all of her questions had been answered to her full understanding.  She stated that she felt fully informed and completely capable of making an informed decision.  She requests and desires to proceed with EGD and colonoscopy.

## 2020-01-27 NOTE — PROGRESS NOTES
Subjective:       Patient ID: Kathi Head     Chief Complaint:  Follow-up (US 1/20/20)      HPI:  Ms. Head returns today to review ultrasound results.  She is still experiencing generalized abdominal discomfort/tenderness, bloating, and borborygmi.  Long history of reflux disease.  In the interval since her last visit she had a gallbladder ultrasound that shows no evidence of cholelithiasis, cholecystitis, choledocholithiasis, or dilated bile ducts.  She has numerous other complaints.  Her predominant complaint is sinus mucus drainage that is causing hoarseness, teeth rot, and gum rot.  She was also concerned that the mucous is producing her abdominal symptoms.  She is followed by a ENT provider and an allergist with numerous therapies instituted for this complaint.  She is not experiencing any nausea, vomiting, diarrhea, constipation, melena, hematochezia, hematemesis, weight loss, fevers, chills, night sweats, etc.  No other associated symptoms.  No triggering issues.  No aggravating factors.  No alleviating factors.  She is unable to comply with lifestyle modifications for reflux disease. She cannot sleep with her head elevated because it causes headaches and neck pain.      Allergies & Meds:  Review of patient's allergies indicates:  No Known Allergies    Current Outpatient Medications   Medication Sig Dispense Refill    FLUZONE HIGH-DOSE 2019-20, PF, 180 mcg/0.5 mL Syrg       gabapentin (NEURONTIN) 300 MG capsule       levothyroxine (SYNTHROID) 100 MCG tablet Take 1 tablet (100 mcg total) by mouth once daily. 90 tablet 0    magnesium oxide (MAG-OX) 400 mg (241.3 mg magnesium) tablet       omega 3-dha-epa-fish oil 1,000 mg (120 mg-180 mg) Cap       prenatal no122-iron-folic acid  mg-mcg Tab       sodium,potassium,mag sulfates (SUPREP BOWEL PREP KIT) 17.5-3.13-1.6 gram SolR Follow written instructions provided by Clinic 2 Bottle 0     No current facility-administered medications for this  visit.        PMFSHx:  Past Medical History:   Diagnosis Date    Allergy     GERD (gastroesophageal reflux disease)     Hyperlipidemia     Neck pain 12/9/2019    Thyroid disease        Past Surgical History:   Procedure Laterality Date    APPENDECTOMY      COLONOSCOPY N/A 10/2/2018    Procedure: COLONOSCOPY;  Surgeon: Maci Sandoval MD;  Location: [facility];  Service: Endoscopy;  Laterality: N/A;    [facility]    HYSTERECTOMY      NASAL SEPTUM SURGERY      THYROID SURGERY         Family History   Problem Relation Age of Onset    Breast cancer Neg Hx     Eczema Neg Hx     Lupus Neg Hx     Psoriasis Neg Hx     Melanoma Neg Hx        Social History     Tobacco Use    Smoking status: Never Smoker    Smokeless tobacco: Never Used   Substance Use Topics    Alcohol use: No    Drug use: No       Review of Systems   Constitutional: Negative for appetite change, chills, fatigue, fever and unexpected weight change.   HENT: Negative for congestion, dental problem, ear pain, mouth sores, postnasal drip, rhinorrhea, sore throat, tinnitus, trouble swallowing and voice change.    Eyes: Negative for photophobia, pain, discharge and visual disturbance.   Respiratory: Negative for cough, chest tightness, shortness of breath and wheezing.    Cardiovascular: Negative for chest pain, palpitations and leg swelling.   Gastrointestinal: Negative for blood in stool, constipation, diarrhea, nausea and vomiting.   Endocrine: Negative for cold intolerance, heat intolerance, polydipsia, polyphagia and polyuria.   Genitourinary: Negative for difficulty urinating, dysuria, flank pain, frequency, hematuria and urgency.   Musculoskeletal: Negative for arthralgias, joint swelling and myalgias.   Skin: Negative for color change and rash.   Allergic/Immunologic: Negative for immunocompromised state.   Neurological: Negative for dizziness, tremors, seizures,  syncope, speech difficulty, weakness, numbness and headaches.   Hematological: Negative for adenopathy. Does not bruise/bleed easily.   Psychiatric/Behavioral: Negative for agitation, confusion, hallucinations, self-injury and suicidal ideas. The patient is not nervous/anxious.             Physical Exam   Constitutional: She is oriented to person, place, and time. She appears well-developed and well-nourished. She is active.  Non-toxic appearance. No distress.   Body mass index is 25.37 kg/m².     HENT:   Head: Normocephalic and atraumatic.   Right Ear: Hearing and external ear normal. No drainage or tenderness.   Left Ear: Hearing and external ear normal. No drainage or tenderness.   Nose: Nose normal. No rhinorrhea. No epistaxis.   Mouth/Throat: Uvula is midline, oropharynx is clear and moist and mucous membranes are normal. Mucous membranes are not pale, not dry and not cyanotic. No oropharyngeal exudate.   Eyes: Pupils are equal, round, and reactive to light. Conjunctivae and lids are normal. Right eye exhibits no discharge and no exudate. Left eye exhibits no discharge and no exudate. Right conjunctiva is not injected. Right eye exhibits no nystagmus. Left eye exhibits no nystagmus.   Neck: Trachea normal, full passive range of motion without pain and phonation normal. No JVD present. Carotid bruit is not present. No tracheal deviation present. No thyroid mass and no thyromegaly present.   Cardiovascular: Normal rate, regular rhythm, S1 normal, S2 normal, normal heart sounds and intact distal pulses. PMI is not displaced. Exam reveals no gallop and no friction rub.   No murmur heard.  Pulmonary/Chest: Effort normal and breath sounds normal. No accessory muscle usage. No respiratory distress. She exhibits no mass, no tenderness and no crepitus. Right breast exhibits no inverted nipple, no mass, no nipple discharge, no skin change and no tenderness. Left breast exhibits no inverted nipple, no mass, no nipple  discharge, no skin change and no tenderness. Breasts are symmetrical.   Abdominal: Soft. Normal appearance and bowel sounds are normal. She exhibits no distension, no fluid wave, no abdominal bruit and no mass. There is no hepatosplenomegaly. There is no tenderness. There is no rebound, no guarding and negative Dillard's sign. No hernia. Hernia confirmed negative in the right inguinal area and confirmed negative in the left inguinal area.   Genitourinary: Vagina normal. Rectal exam shows guaiac positive stool. Rectal exam shows no external hemorrhoid, no internal hemorrhoid, no fissure, no mass, no tenderness and anal tone normal. There is no rash or lesion on the right labia. There is no rash or lesion on the left labia. Right adnexum displays no mass. Left adnexum displays no mass. No vaginal discharge found.   Musculoskeletal:        Cervical back: Normal.        Thoracic back: Normal.        Lumbar back: Normal.        Right upper arm: Normal.        Left upper arm: Normal.        Right forearm: Normal.        Left forearm: Normal.        Right hand: Normal.        Left hand: Normal.        Right upper leg: Normal.        Left upper leg: Normal.        Right lower leg: Normal.        Left lower leg: Normal.        Right foot: Normal.        Left foot: Normal.   Lymphadenopathy:        Head (right side): No submental, no submandibular and no posterior auricular adenopathy present.        Head (left side): No submental, no submandibular and no posterior auricular adenopathy present.     She has no cervical adenopathy.     She has no axillary adenopathy.        Right: No inguinal and no supraclavicular adenopathy present.        Left: No inguinal and no supraclavicular adenopathy present.   Neurological: She is alert and oriented to person, place, and time. She has normal strength. No cranial nerve deficit or sensory deficit. Coordination and gait normal. GCS eye subscore is 4. GCS verbal subscore is 5. GCS motor  subscore is 6.   Skin: Skin is warm, dry and intact. No rash noted. No cyanosis. Nails show no clubbing.   Psychiatric: She has a normal mood and affect. Her speech is normal. Judgment and thought content normal. Her mood appears not anxious. Her affect is not inappropriate. She is not actively hallucinating. She does not exhibit a depressed mood.           Medical Records Review:  Gallbladder ultrasound films and report reviewed from 1/20/2020 with the above summarized findings noted.        Assessment:       Heme positive stool.  Generalized abdominal pain.  Borborygmi.  Differential diagnosis includes but is not limited to esophageal neoplasm, esophagitis, esophageal ulcer, gastroesophageal reflux disease, gastritis, gastric ulcer, gastric cancer, duodenitis, duodenal ulcer, inflammatory bowel disease, diverticulosis, hemorrhoids, gastrointestinal angiodysplasias, benign gastrointestinal neoplasm, colon cancer, etc.  Options at this time include but are not limited to endoscopy, second opinion, capsule endoscopy, radiologic studies, observation, etc.  Multiple comorbid issues ( chronic pain, hypothyroidism ) increase the complexity of required perioperative evaluation & management, risks of complications, and likely will increase the difficulty and complexity of postoperative care, etc.  These issues must be factored in to preoperative evaluation and perioperative management.      1. Heme positive stool    2. Borborygmi    3. Generalized abdominal pain          Plan:   Heme positive stool  -     Case Request Operating Room: COLONOSCOPY, ESOPHAGOGASTRODUODENOSCOPY (EGD)  -     Comprehensive metabolic panel; Future; Expected date: 01/27/2020  -     CBC auto differential; Future; Expected date: 01/27/2020  -     EKG 12-lead; Future; Expected date: 01/27/2020    Borborygmi    Generalized abdominal pain    Other orders  -     sodium,potassium,mag sulfates (SUPREP BOWEL PREP KIT) 17.5-3.13-1.6 gram SolR; Follow written  instructions provided by Clinic  Dispense: 2 Bottle; Refill: 0        Follow up in about 6 weeks (around 3/9/2020) for routine post-endosocpy visit.    Counseling/Medical Decision Making:  Kathi Head was counseled regarding the results of the evaluation thus far and the differential diagnosis.  Diagnostic and therapeutic options were discussed in detail along with the risks, benefits, possible complications, details of, and indications for each option.  Diagnoses discussed included but were not limited to: GB disease, GERD, hiatal hernia, PUD, gastritis, duodenitis, Sphincter of Oddi dysfunction, hemorrhoids, diverticulosis, cancer, IBD, IBS, benign tumors, angiodysplasias, ischemic disease.  Options discussed included but were not limited to: EGD, colonoscopy, proctoscopy, anoscopy, sigmoidoscopy, radiologic studies, PillCam, empiric therapy, second opinion, etc. Possible complications of endoscopy discussed included but were not limited to: bleeding, infections, endocarditis, injury to internal organs, incomplete exam, failure to diagnose cancer or other serious condition, need for emergency surgery, need for a temporary colostomy, need for additional operations or procedures, etc.  Entire conversation was held in layman's terms.  All unfamiliar terms were defined.  Questions solicited and answered.  I read the consent form to her verbatim.  Krames booklets were provided on EGD, GERD, GB disease / surgery, colonoscopy, polyps, diverticulosis, hemorrhoids, cancer, etc.  A copy of the consent form was provided for her review outside the office / hospital prior to the procedures.  At the conclusion of the conversation she voiced complete understanding of all we discussed and satisfaction that all of her questions had been answered to her full understanding.  She stated that she felt fully informed and completely capable of making an informed decision.  She requests and desires to proceed with EGD and  colonoscopy.    Total face to face encounter time was 40 minutes, 30 minutes spent counseling as outlined/summarized above.

## 2020-02-18 ENCOUNTER — HOSPITAL ENCOUNTER (OUTPATIENT)
Dept: CARDIOLOGY | Facility: HOSPITAL | Age: 70
Discharge: HOME | End: 2020-02-18
Attending: SURGERY
Payer: MEDICARE

## 2020-02-18 DIAGNOSIS — R19.5 HEME POSITIVE STOOL: ICD-10-CM

## 2020-02-18 PROCEDURE — 93005 ELECTROCARDIOGRAM TRACING: CPT

## 2020-02-21 ENCOUNTER — ANESTHESIA (OUTPATIENT)
Dept: SURGERY | Facility: HOSPITAL | Age: 70
End: 2020-02-21
Payer: MEDICARE

## 2020-02-21 ENCOUNTER — ANESTHESIA EVENT (OUTPATIENT)
Dept: SURGERY | Facility: HOSPITAL | Age: 70
End: 2020-02-21
Payer: MEDICARE

## 2020-02-21 ENCOUNTER — HOSPITAL ENCOUNTER (OUTPATIENT)
Facility: HOSPITAL | Age: 70
Discharge: HOME | End: 2020-02-21
Attending: SURGERY | Admitting: SURGERY
Payer: MEDICARE

## 2020-02-21 VITALS
BODY MASS INDEX: 25.84 KG/M2 | WEIGHT: 165 LBS | OXYGEN SATURATION: 99 % | TEMPERATURE: 98 F | RESPIRATION RATE: 11 BRPM | HEART RATE: 78 BPM | DIASTOLIC BLOOD PRESSURE: 62 MMHG | SYSTOLIC BLOOD PRESSURE: 108 MMHG

## 2020-02-21 DIAGNOSIS — R19.5 HEME POSITIVE STOOL: ICD-10-CM

## 2020-02-21 PROBLEM — K64.1 GRADE II HEMORRHOIDS: Status: ACTIVE | Noted: 2020-02-21

## 2020-02-21 PROBLEM — K60.2 ANAL FISSURE: Status: ACTIVE | Noted: 2020-02-21

## 2020-02-21 PROBLEM — K29.30 CHRONIC SUPERFICIAL GASTRITIS WITHOUT BLEEDING: Status: ACTIVE | Noted: 2020-02-21

## 2020-02-21 PROCEDURE — 88342 IMHCHEM/IMCYTCHM 1ST ANTB: CPT | Performed by: PATHOLOGY

## 2020-02-21 PROCEDURE — 27201012 HC FORCEPS, HOT/COLD, DISP: Performed by: SURGERY

## 2020-02-21 PROCEDURE — 88305 TISSUE EXAM BY PATHOLOGIST: CPT | Mod: 26,,, | Performed by: PATHOLOGY

## 2020-02-21 PROCEDURE — 88342 CHG IMMUNOCYTOCHEMISTRY: ICD-10-PCS | Mod: 26,,, | Performed by: PATHOLOGY

## 2020-02-21 PROCEDURE — 63600175 PHARM REV CODE 636 W HCPCS: Performed by: NURSE ANESTHETIST, CERTIFIED REGISTERED

## 2020-02-21 PROCEDURE — 88341 PR IHC OR ICC EACH ADD'L SINGLE ANTIBODY  STAINPR: ICD-10-PCS | Mod: 26,,, | Performed by: PATHOLOGY

## 2020-02-21 PROCEDURE — 43239 EGD BIOPSY SINGLE/MULTIPLE: CPT | Mod: 51,,, | Performed by: SURGERY

## 2020-02-21 PROCEDURE — 88341 IMHCHEM/IMCYTCHM EA ADD ANTB: CPT | Performed by: PATHOLOGY

## 2020-02-21 PROCEDURE — 88305 TISSUE EXAM BY PATHOLOGIST: ICD-10-PCS | Mod: 26,,, | Performed by: PATHOLOGY

## 2020-02-21 PROCEDURE — 88305 TISSUE EXAM BY PATHOLOGIST: CPT | Mod: 59 | Performed by: PATHOLOGY

## 2020-02-21 PROCEDURE — 43239 PR EGD, FLEX, W/BIOPSY, SGL/MULTI: ICD-10-PCS | Mod: 51,,, | Performed by: SURGERY

## 2020-02-21 PROCEDURE — 45378 DIAGNOSTIC COLONOSCOPY: CPT | Performed by: SURGERY

## 2020-02-21 PROCEDURE — 25000003 PHARM REV CODE 250: Performed by: NURSE ANESTHETIST, CERTIFIED REGISTERED

## 2020-02-21 PROCEDURE — 37000008 HC ANESTHESIA 1ST 15 MINUTES: Performed by: SURGERY

## 2020-02-21 PROCEDURE — 88341 IMHCHEM/IMCYTCHM EA ADD ANTB: CPT | Mod: 26,,, | Performed by: PATHOLOGY

## 2020-02-21 PROCEDURE — D9220A PRA ANESTHESIA: Mod: ANES,,, | Performed by: ANESTHESIOLOGY

## 2020-02-21 PROCEDURE — 43239 EGD BIOPSY SINGLE/MULTIPLE: CPT | Performed by: SURGERY

## 2020-02-21 PROCEDURE — D9220A PRA ANESTHESIA: ICD-10-PCS | Mod: CRNA,,, | Performed by: NURSE ANESTHETIST, CERTIFIED REGISTERED

## 2020-02-21 PROCEDURE — 88342 IMHCHEM/IMCYTCHM 1ST ANTB: CPT | Mod: 26,,, | Performed by: PATHOLOGY

## 2020-02-21 PROCEDURE — 45378 DIAGNOSTIC COLONOSCOPY: CPT | Mod: ,,, | Performed by: SURGERY

## 2020-02-21 PROCEDURE — D9220A PRA ANESTHESIA: Mod: CRNA,,, | Performed by: NURSE ANESTHETIST, CERTIFIED REGISTERED

## 2020-02-21 PROCEDURE — D9220A PRA ANESTHESIA: ICD-10-PCS | Mod: ANES,,, | Performed by: ANESTHESIOLOGY

## 2020-02-21 PROCEDURE — 63600175 PHARM REV CODE 636 W HCPCS: Performed by: SURGERY

## 2020-02-21 PROCEDURE — 37000009 HC ANESTHESIA EA ADD 15 MINS: Performed by: SURGERY

## 2020-02-21 PROCEDURE — 45378 PR COLONOSCOPY,DIAGNOSTIC: ICD-10-PCS | Mod: ,,, | Performed by: SURGERY

## 2020-02-21 RX ORDER — SODIUM CHLORIDE, SODIUM LACTATE, POTASSIUM CHLORIDE, CALCIUM CHLORIDE 600; 310; 30; 20 MG/100ML; MG/100ML; MG/100ML; MG/100ML
125 INJECTION, SOLUTION INTRAVENOUS CONTINUOUS
Status: DISCONTINUED | OUTPATIENT
Start: 2020-02-21 | End: 2020-02-21 | Stop reason: HOSPADM

## 2020-02-21 RX ORDER — GLYCOPYRROLATE 0.2 MG/ML
INJECTION INTRAMUSCULAR; INTRAVENOUS
Status: DISCONTINUED | OUTPATIENT
Start: 2020-02-21 | End: 2020-02-21

## 2020-02-21 RX ORDER — LIDOCAINE HYDROCHLORIDE 10 MG/ML
1 INJECTION, SOLUTION EPIDURAL; INFILTRATION; INTRACAUDAL; PERINEURAL ONCE
Status: DISCONTINUED | OUTPATIENT
Start: 2020-02-21 | End: 2020-02-21 | Stop reason: HOSPADM

## 2020-02-21 RX ORDER — HYDROCORTISONE 10 MG/G
1 CREAM TOPICAL 4 TIMES DAILY
Qty: 28.4 G | Refills: 11 | Status: SHIPPED | OUTPATIENT
Start: 2020-02-21 | End: 2020-10-22

## 2020-02-21 RX ORDER — ONDANSETRON 2 MG/ML
4 INJECTION INTRAMUSCULAR; INTRAVENOUS DAILY PRN
Status: DISCONTINUED | OUTPATIENT
Start: 2020-02-21 | End: 2020-02-21 | Stop reason: HOSPADM

## 2020-02-21 RX ORDER — EPHEDRINE SULFATE 50 MG/ML
INJECTION, SOLUTION INTRAVENOUS
Status: DISCONTINUED | OUTPATIENT
Start: 2020-02-21 | End: 2020-02-21

## 2020-02-21 RX ORDER — SODIUM CHLORIDE, SODIUM LACTATE, POTASSIUM CHLORIDE, CALCIUM CHLORIDE 600; 310; 30; 20 MG/100ML; MG/100ML; MG/100ML; MG/100ML
INJECTION, SOLUTION INTRAVENOUS CONTINUOUS
Status: DISCONTINUED | OUTPATIENT
Start: 2020-02-21 | End: 2020-02-21 | Stop reason: HOSPADM

## 2020-02-21 RX ORDER — LIDOCAINE HYDROCHLORIDE 20 MG/ML
INJECTION, SOLUTION EPIDURAL; INFILTRATION; INTRACAUDAL; PERINEURAL
Status: DISCONTINUED | OUTPATIENT
Start: 2020-02-21 | End: 2020-02-21

## 2020-02-21 RX ORDER — MIDAZOLAM HYDROCHLORIDE 1 MG/ML
INJECTION, SOLUTION INTRAMUSCULAR; INTRAVENOUS
Status: DISCONTINUED | OUTPATIENT
Start: 2020-02-21 | End: 2020-02-21

## 2020-02-21 RX ORDER — PROPOFOL 10 MG/ML
VIAL (ML) INTRAVENOUS
Status: DISCONTINUED | OUTPATIENT
Start: 2020-02-21 | End: 2020-02-21

## 2020-02-21 RX ORDER — PANTOPRAZOLE SODIUM 40 MG/1
40 TABLET, DELAYED RELEASE ORAL DAILY
Qty: 30 TABLET | Refills: 11 | Status: ON HOLD | OUTPATIENT
Start: 2020-02-21 | End: 2021-07-09

## 2020-02-21 RX ADMIN — PROPOFOL 30 MG: 10 INJECTION, EMULSION INTRAVENOUS at 10:02

## 2020-02-21 RX ADMIN — PROPOFOL 20 MG: 10 INJECTION, EMULSION INTRAVENOUS at 11:02

## 2020-02-21 RX ADMIN — PROPOFOL 30 MG: 10 INJECTION, EMULSION INTRAVENOUS at 11:02

## 2020-02-21 RX ADMIN — EPHEDRINE SULFATE 15 MG: 50 INJECTION INTRAMUSCULAR; INTRAVENOUS; SUBCUTANEOUS at 11:02

## 2020-02-21 RX ADMIN — EPHEDRINE SULFATE 10 MG: 50 INJECTION INTRAMUSCULAR; INTRAVENOUS; SUBCUTANEOUS at 11:02

## 2020-02-21 RX ADMIN — PROPOFOL 50 MG: 10 INJECTION, EMULSION INTRAVENOUS at 11:02

## 2020-02-21 RX ADMIN — PROPOFOL 50 MG: 10 INJECTION, EMULSION INTRAVENOUS at 10:02

## 2020-02-21 RX ADMIN — GLYCOPYRROLATE 0.2 MCG: 0.2 INJECTION INTRAMUSCULAR; INTRAVENOUS at 10:02

## 2020-02-21 RX ADMIN — EPHEDRINE SULFATE 15 MG: 50 INJECTION INTRAMUSCULAR; INTRAVENOUS; SUBCUTANEOUS at 10:02

## 2020-02-21 RX ADMIN — EPHEDRINE SULFATE 10 MG: 50 INJECTION INTRAMUSCULAR; INTRAVENOUS; SUBCUTANEOUS at 10:02

## 2020-02-21 RX ADMIN — MIDAZOLAM HYDROCHLORIDE 2 MG: 1 INJECTION, SOLUTION INTRAMUSCULAR; INTRAVENOUS at 10:02

## 2020-02-21 RX ADMIN — SODIUM CHLORIDE, SODIUM LACTATE, POTASSIUM CHLORIDE, AND CALCIUM CHLORIDE: .6; .31; .03; .02 INJECTION, SOLUTION INTRAVENOUS at 08:02

## 2020-02-21 RX ADMIN — LIDOCAINE HYDROCHLORIDE 100 MG: 20 INJECTION, SOLUTION EPIDURAL; INFILTRATION; INTRACAUDAL; PERINEURAL at 10:02

## 2020-02-21 NOTE — ANESTHESIA POSTPROCEDURE EVALUATION
Anesthesia Post Evaluation    Patient: Kathi Head    Procedure(s) Performed: Procedure(s) (LRB):  COLONOSCOPY (N/A)  ESOPHAGOGASTRODUODENOSCOPY (EGD) (N/A)    Final Anesthesia Type: general    Patient location during evaluation: PACU  Patient participation: Yes- Able to Participate  Level of consciousness: awake and awake and alert  Post-procedure vital signs: reviewed and stable  Pain management: adequate  Airway patency: patent    PONV status at discharge: No PONV  Anesthetic complications: no      Cardiovascular status: blood pressure returned to baseline  Respiratory status: unassisted and spontaneous ventilation  Hydration status: euvolemic  Follow-up not needed.          Vitals Value Taken Time   /59 2/21/2020 12:22 PM   Temp 36.7 °C (98 °F) 2/21/2020  8:54 AM   Pulse 75 2/21/2020 12:22 PM   Resp 11 2/21/2020 12:23 PM   SpO2 81 % 2/21/2020 12:22 PM   Vitals shown include unvalidated device data.      Event Time     Out of Recovery 11:59:02          Pain/Jerilyn Score: Jerilyn Score: 10 (2/21/2020 12:10 PM)  Modified Jerilyn Score: 19 (2/21/2020 12:10 PM)

## 2020-02-21 NOTE — INTERVAL H&P NOTE
The patient has been examined and the H&P has been reviewed:    I concur with the findings and no changes have occurred since H&P was written.     Lab results reviewed from 2/18/2020.  CBC shows no evidence of leukocytosis, anemia, or thrombocytopenia.  Electrolytes reveal a mild hypercarbia.  BUN and creatinine show no evidence of renal dysfunction.  Glucose shows no suspicion diabetes.  Liver profile shows no evidence of hepatocellular disease or biliary obstruction.    EKG reviewed from 2/18/2020.  Twelve lead tracing showed normal sinus rhythm with no evidence of any ischemic changes.    Surgery risks, benefits and alternative options discussed and understood by patient/family.    No evident contraindication to proceeding with planned endoscopy today.          There are no hospital problems to display for this patient.

## 2020-02-21 NOTE — PROVATION PATIENT INSTRUCTIONS
Discharge Summary/Instructions after an Endoscopic Procedure  Patient Name: Kathi Head  Patient MRN: [id number]  Patient YOB: 1950  Friday, February 21, 2020  Tra Pedraza MD  RESTRICTIONS:  During your procedure today, you received medications for sedation.  These   medications may affect your judgment, balance and coordination.  Therefore,   for 24 hours, you have the following restrictions:   - DO NOT drive a car, operate machinery, make legal/financial decisions,   sign important papers or drink alcohol.    ACTIVITY:  Today: no heavy lifting, straining or running due to procedural   sedation/anesthesia.  The following day: return to full activity including work.  DIET:  Eat and drink normally unless instructed otherwise.     TREATMENT FOR COMMON SIDE EFFECTS:  - Mild abdominal pain, nausea, belching, bloating or excessive gas:  rest,   eat lightly and use a heating pad.  - Sore Throat: treat with throat lozenges and/or gargle with warm salt   water.  - Because air was used during the procedure, expelling large amounts of air   from your rectum or belching is normal.  - If a bowel prep was taken, you may not have a bowel movement for 1-3 days.    This is normal.  SYMPTOMS TO WATCH FOR AND REPORT TO YOUR PHYSICIAN:  1. Abdominal pain or bloating, other than gas cramps.  2. Chest pain.  3. Back pain.  4. Signs of infection such as: chills or fever occurring within 24 hours   after the procedure.  5. Rectal bleeding, which would show as bright red, maroon, or black stools.   (A tablespoon of blood from the rectum is not serious, especially if   hemorrhoids are present.)  6. Vomiting.  7. Weakness or dizziness.  GO DIRECTLY TO THE NEAREST EMERGENCY ROOM IF YOU HAVE ANY OF THE FOLLOWING:      Difficulty breathing              Chills and/or fever over 101 F   Persistent vomiting and/or vomiting blood   Severe abdominal pain   Severe chest pain   Black, tarry stools   Bleeding- more than one  tablespoon   Any other symptom or condition that you feel may need urgent attention  Your doctor recommends these additional instructions:  If any biopsies were taken, your doctors clinic will contact you in 1 to 2   weeks with any results.  - Discharge patient to home.   - Resume previous diet.   - Continue present medications.   - Begin Protonix 40 mg daily; prescription submitted  - Await pathology results.   - Treat Helicobacter if present  - Review Colonoscopy Report  - Return to my office in 2 weeks.   - Patient has a contact number available for emergencies.  The signs and   symptoms of potential delayed complications were discussed with the   patient.  Return to normal activities tomorrow.  Written discharge   instructions were provided to the patient.  For questions, problems or results please call your physician - Tra Pedraza MD at Work:  [phone].  [facility] EMERGENCY ROOM PHONE NUMBER: [phone]  IF A COMPLICATION OR EMERGENCY SITUATION ARISES AND YOU ARE UNABLE TO REACH   YOUR PHYSICIAN - GO DIRECTLY TO THE EMERGENCY ROOM.  MD Tra Thacker MD  2/21/2020 11:36:14 AM  This report has been verified and signed electronically.  [employer]ATION

## 2020-02-21 NOTE — ANESTHESIA PREPROCEDURE EVALUATION
02/21/2020  Kathi Head is a 69 y.o., female.    Pre-op Assessment    I have reviewed the Patient Summary Reports.    I have reviewed the Nursing Notes.   I have reviewed the Medications.     Review of Systems  Anesthesia Hx:  No problems with previous Anesthesia  Neg history of prior surgery. Denies Family Hx of Anesthesia complications.   Denies Personal Hx of Anesthesia complications.   Social:  Non-Smoker    Hematology/Oncology:  Hematology Normal   Oncology Normal     EENT/Dental:EENT/Dental Normal   Cardiovascular:  Cardiovascular Normal     Pulmonary:  Pulmonary Normal    Renal/:  Renal/ Normal     Hepatic/GI:   GERD, well controlled    Musculoskeletal:  Musculoskeletal Normal    Neurological:   Headaches    Endocrine:   Hypothyroidism    Dermatological:  Skin Normal    Psych:  Psychiatric Normal           Physical Exam  General:  Well nourished    Airway/Jaw/Neck:  Airway Findings: Mouth Opening: Normal Tongue: Normal  General Airway Assessment: Adult  Mallampati: II  TM Distance: 4 - 6 cm        Eyes/Ears/Nose:  EYES/EARS/NOSE FINDINGS: Normal   Dental:  DENTAL FINDINGS: Normal   Chest/Lungs:  Chest/Lungs Clear    Heart/Vascular:  Heart Findings: Normal Heart murmur: negative Vascular Findings: Normal    Abdomen:  Abdomen Findings: Normal    Musculoskeletal:  Musculoskeletal Findings: Normal   Skin:  Skin Findings: Normal    Mental Status:  Mental Status Findings: Normal        Anesthesia Plan  Type of Anesthesia, risks & benefits discussed:  Anesthesia Type:  general  Patient's Preference:   Intra-op Monitoring Plan: standard ASA monitors  Intra-op Monitoring Plan Comments:   Post Op Pain Control Plan:   Post Op Pain Control Plan Comments:   Induction:   IV  Beta Blocker:  Patient is not currently on a Beta-Blocker (No further documentation required).       Informed Consent: Patient  understands risks and agrees with Anesthesia plan.  Questions answered. Anesthesia consent signed with patient.  ASA Score: 2     Day of Surgery Review of History & Physical:    H&P update referred to the provider.

## 2020-02-21 NOTE — DISCHARGE SUMMARY
[facility]  Discharge Note  Short Stay    Procedure(s) (LRB):  COLONOSCOPY (N/A)  ESOPHAGOGASTRODUODENOSCOPY (EGD) (N/A)    OUTCOME: Patient tolerated treatment/procedure well without complication and is now ready for discharge.    DISPOSITION: Home or Self Care    FINAL DIAGNOSIS:  Heme positive stool.  Gastritis.  Grade 2 hemorrhoids.  Posterior anal fissure.    FOLLOWUP: In clinic    DISCHARGE INSTRUCTIONS:    Discharge Procedure Orders   Diet Adult Regular     No driving until:     Order Specific Question Answer Comments   Date: 2/22/2020

## 2020-02-21 NOTE — PLAN OF CARE
Pt met d/c criteria instructions reviewed with pt and spouse, verbalized understanding. Pt waiting to speak with Gaby Serrano.

## 2020-02-21 NOTE — PROVATION PATIENT INSTRUCTIONS
Discharge Summary/Instructions after an Endoscopic Procedure  Patient Name: Kathi Head  Patient MRN: [id number]  Patient YOB: 1950  Friday, February 21, 2020  Tra Pedraza MD  RESTRICTIONS:  During your procedure today, you received medications for sedation.  These   medications may affect your judgment, balance and coordination.  Therefore,   for 24 hours, you have the following restrictions:   - DO NOT drive a car, operate machinery, make legal/financial decisions,   sign important papers or drink alcohol.    ACTIVITY:  Today: no heavy lifting, straining or running due to procedural   sedation/anesthesia.  The following day: return to full activity including work.  DIET:  Eat and drink normally unless instructed otherwise.     TREATMENT FOR COMMON SIDE EFFECTS:  - Mild abdominal pain, nausea, belching, bloating or excessive gas:  rest,   eat lightly and use a heating pad.  - Sore Throat: treat with throat lozenges and/or gargle with warm salt   water.  - Because air was used during the procedure, expelling large amounts of air   from your rectum or belching is normal.  - If a bowel prep was taken, you may not have a bowel movement for 1-3 days.    This is normal.  SYMPTOMS TO WATCH FOR AND REPORT TO YOUR PHYSICIAN:  1. Abdominal pain or bloating, other than gas cramps.  2. Chest pain.  3. Back pain.  4. Signs of infection such as: chills or fever occurring within 24 hours   after the procedure.  5. Rectal bleeding, which would show as bright red, maroon, or black stools.   (A tablespoon of blood from the rectum is not serious, especially if   hemorrhoids are present.)  6. Vomiting.  7. Weakness or dizziness.  GO DIRECTLY TO THE NEAREST EMERGENCY ROOM IF YOU HAVE ANY OF THE FOLLOWING:      Difficulty breathing              Chills and/or fever over 101 F   Persistent vomiting and/or vomiting blood   Severe abdominal pain   Severe chest pain   Black, tarry stools   Bleeding- more than one  tablespoon   Any other symptom or condition that you feel may need urgent attention  Your doctor recommends these additional instructions:  If any biopsies were taken, your doctors clinic will contact you in 1 to 2   weeks with any results.  - Discharge patient to home.   - Resume previous diet.   - Continue present medications.   - Anusol (pramoxine) HC cream: Apply externally QID.   - Return to my office in 2 weeks.   - See EGD Report  - Patient has a contact number available for emergencies.  The signs and   symptoms of potential delayed complications were discussed with the   patient.  Return to normal activities tomorrow.  Written discharge   instructions were provided to the patient.  For questions, problems or results please call your physician - Tra Pedraza MD at Work:  [phone].  [facility] EMERGENCY ROOM PHONE NUMBER: [phone]  IF A COMPLICATION OR EMERGENCY SITUATION ARISES AND YOU ARE UNABLE TO REACH   YOUR PHYSICIAN - GO DIRECTLY TO THE EMERGENCY ROOM.  MD Tra Thacker MD  2/21/2020 11:51:38 AM  This report has been verified and signed electronically.  PROVATION

## 2020-02-21 NOTE — TRANSFER OF CARE
Anesthesia Transfer of Care Note    Patient: Kathi Baker Salters    Procedure(s) Performed: Procedure(s) (LRB):  COLONOSCOPY (N/A)  ESOPHAGOGASTRODUODENOSCOPY (EGD) (N/A)    Patient location: PACU    Anesthesia Type: general    Transport from OR: Transported from OR on room air with adequate spontaneous ventilation    Post pain: adequate analgesia    Post assessment: no apparent anesthetic complications and tolerated procedure well    Post vital signs: stable    Level of consciousness: awake, alert and oriented    Nausea/Vomiting: no nausea/vomiting    Complications: none    Transfer of care protocol was followed      Last vitals:   Visit Vitals  /81 (BP Location: Left arm)   Pulse 75   Temp 36.7 °C (98 °F) (Oral)   Resp 13   Wt 74.8 kg (165 lb)   LMP  (LMP Unknown)   SpO2 100%   Breastfeeding? No   BMI 25.84 kg/m²

## 2020-03-02 LAB
COMMENT: NORMAL
FINAL PATHOLOGIC DIAGNOSIS: NORMAL
GROSS: NORMAL

## 2020-03-05 ENCOUNTER — TELEPHONE (OUTPATIENT)
Dept: SURGERY | Facility: CLINIC | Age: 70
End: 2020-03-05

## 2020-03-05 ENCOUNTER — OFFICE VISIT (OUTPATIENT)
Dept: SURGERY | Facility: CLINIC | Age: 70
End: 2020-03-05
Payer: MEDICARE

## 2020-03-05 VITALS
BODY MASS INDEX: 25.58 KG/M2 | DIASTOLIC BLOOD PRESSURE: 75 MMHG | WEIGHT: 163 LBS | HEIGHT: 67 IN | OXYGEN SATURATION: 97 % | SYSTOLIC BLOOD PRESSURE: 106 MMHG | TEMPERATURE: 97 F | RESPIRATION RATE: 12 BRPM | HEART RATE: 81 BPM

## 2020-03-05 DIAGNOSIS — R10.84 GENERALIZED ABDOMINAL PAIN: Primary | ICD-10-CM

## 2020-03-05 DIAGNOSIS — R19.8 BORBORYGMI: ICD-10-CM

## 2020-03-05 PROCEDURE — 3078F PR MOST RECENT DIASTOLIC BLOOD PRESSURE < 80 MM HG: ICD-10-PCS | Mod: CPTII,S$GLB,, | Performed by: SURGERY

## 2020-03-05 PROCEDURE — 1125F PR PAIN SEVERITY QUANTIFIED, PAIN PRESENT: ICD-10-PCS | Mod: S$GLB,,, | Performed by: SURGERY

## 2020-03-05 PROCEDURE — 3288F FALL RISK ASSESSMENT DOCD: CPT | Mod: CPTII,S$GLB,, | Performed by: SURGERY

## 2020-03-05 PROCEDURE — 99213 PR OFFICE/OUTPT VISIT, EST, LEVL III, 20-29 MIN: ICD-10-PCS | Mod: S$GLB,,, | Performed by: SURGERY

## 2020-03-05 PROCEDURE — 1100F PTFALLS ASSESS-DOCD GE2>/YR: CPT | Mod: CPTII,S$GLB,, | Performed by: SURGERY

## 2020-03-05 PROCEDURE — 1159F PR MEDICATION LIST DOCUMENTED IN MEDICAL RECORD: ICD-10-PCS | Mod: S$GLB,,, | Performed by: SURGERY

## 2020-03-05 PROCEDURE — 3074F PR MOST RECENT SYSTOLIC BLOOD PRESSURE < 130 MM HG: ICD-10-PCS | Mod: CPTII,S$GLB,, | Performed by: SURGERY

## 2020-03-05 PROCEDURE — 3074F SYST BP LT 130 MM HG: CPT | Mod: CPTII,S$GLB,, | Performed by: SURGERY

## 2020-03-05 PROCEDURE — 1100F PR PT FALLS ASSESS DOC 2+ FALLS/FALL W/INJURY/YR: ICD-10-PCS | Mod: CPTII,S$GLB,, | Performed by: SURGERY

## 2020-03-05 PROCEDURE — 99213 OFFICE O/P EST LOW 20 MIN: CPT | Mod: S$GLB,,, | Performed by: SURGERY

## 2020-03-05 PROCEDURE — 1125F AMNT PAIN NOTED PAIN PRSNT: CPT | Mod: S$GLB,,, | Performed by: SURGERY

## 2020-03-05 PROCEDURE — 3078F DIAST BP <80 MM HG: CPT | Mod: CPTII,S$GLB,, | Performed by: SURGERY

## 2020-03-05 PROCEDURE — 1159F MED LIST DOCD IN RCRD: CPT | Mod: S$GLB,,, | Performed by: SURGERY

## 2020-03-05 PROCEDURE — 3288F PR FALLS RISK ASSESSMENT DOCUMENTED: ICD-10-PCS | Mod: CPTII,S$GLB,, | Performed by: SURGERY

## 2020-03-05 NOTE — TELEPHONE ENCOUNTER
----- Message from Coco Naya sent at 3/5/2020  2:12 PM CST -----  Contact: pt  Type: Needs Medical Advice    Who Called:      Best Call Back Number:   Additional Information: Requesting a call back from Nurse, regarding pt forgot to tell dr that her chest is sore and needs advice ,please call

## 2020-03-05 NOTE — TELEPHONE ENCOUNTER
Returned pt's phone call. Advised her to follow up with her PCP, Clare Pan NP- for this issue.  Pt verbalizes understanding.

## 2020-03-05 NOTE — PATIENT INSTRUCTIONS
HIDA Scan    A HIDA scan is an imaging test. It can be used to check for problems in the liver, gallbladder, and bile ducts. During the test, a small amount of radioactive substance (tracer) is injected into a vein in your arm or hand. Pictures are then taken to track the movement of the tracer. The test takes about 2 hours. In some cases, more pictures may need to be taken after a wait of 4 hours. Youll be told as the test progresses how long your test may take.  Before the test  · Follow any directions youre given for not eating or drinking before the procedure. Your healthcare provider will give you instructions if required.  · Tell your healthcare provider what medicines youre taking. This includes vitamins, herbs, and over-the-counter medicines. You may be told to stop taking some or all of them in the days before the test.  · Follow any other instructions youre given to get ready for the test.  For your safety  Let the technologist know if you:  · Are taking any medicines or have allergies to any medicines  · Had recent X-rays or tests that used barium  · Had recent surgery  · Have other health problems, such as diabetes  · Are pregnant or might be pregnant  · Are breastfeeding   During the test  The test is done by a nuclear medicine or radiology technologist. It can be done in a hospital or test center:  · Youll lie on your back on a table. A special camera (also called a scanner) will be positioned above your belly (abdomen).  · An IV (intravenous) needle or IV line is placed into a vein in your arm or hand. The tracer is then injected through the IV line.  · Pictures are taken as the tracer follows the movement of bile through the liver, gallbladder, and bile ducts, and the first part of the small intestine (duodenum). Bile is a substance made by the liver that helps you digest fat.  · Youll need to lie still to help ensure that the pictures are not blurry.  · Based on your healthcare provider's  practices, you may be given a substance by mouth or injected thru a vein that causes the gallbladder to contract and release bile. Be sure to let the technologist know if you feel discomfort.  · In some cases, pain medicine called morphine is injected through the IV line. Morphine helps move the tracer into the gallbladder.  · If needed, more pictures will be taken after 4 hours.  After the test  · The technologist will let you know when the test is completed.  · If you were given a pain medicine such as morphine, have a family member or friend drive you home. The morphine can make you more tired than usual. Rest for as long as needed before you return to your normal routine.  · The tracer will pass out of the body in your stool and urine within 24 hours. Drink plenty of fluids to help the tracer pass.  Follow-up  Your healthcare provider will go over test results with you when they are ready. This is likely within a few days of the test.  Risks and possible complications of a HIDA scan  These can include:  · Problems at the IV site  · Allergic reaction to the tracer or medicine used during the test  · Radiation exposure from the tracer   Date Last Reviewed: 3/1/2017  © 6177-2608 [employer]. [address] [place] [address], [place] [address]. All rights reserved. This information is not intended as a substitute for professional medical care. Always follow your healthcare professional's instructions.

## 2020-03-05 NOTE — PROGRESS NOTES
"Subjective:       Patient ID: Kathi Head is a 69 y.o. female.    Chief Complaint: Follow-up (EGD/Colonoscopy 2/21/20)      HPI:  Ms. Head presents today following a recent outpatient EGD and colonoscopy.  She presents today with no complaints.  No nausea, vomiting, fevers, chills, abdominal pain, diarrhea, constipation, melena, hematochezia, hematemesis, etc.  Appetite is excellent.  Weight is stable.  Activity tolerance is normal.  Overall she feels "great".  EGD revealed evidence of gastritis, colonoscopy was normal except for hemorrhoids and an anal fissure.  She is still experiencing generalized postprandial abdominal pain with borborigmi.        Allergies & Meds:  Review of patient's allergies indicates:  No Known Allergies    Current Outpatient Medications   Medication Sig Dispense Refill    FLUZONE HIGH-DOSE 2019-20, PF, 180 mcg/0.5 mL Syrg       gabapentin (NEURONTIN) 300 MG capsule       hydrocortisone (PROCTO-SILKE) 1 % crpe Place 1 applicator rectally 4 (four) times daily. 28.4 g 11    levothyroxine (SYNTHROID) 100 MCG tablet Take 1 tablet (100 mcg total) by mouth once daily. 90 tablet 0    magnesium oxide (MAG-OX) 400 mg (241.3 mg magnesium) tablet       omega 3-dha-epa-fish oil 1,000 mg (120 mg-180 mg) Cap       pantoprazole (PROTONIX) 40 MG tablet Take 1 tablet (40 mg total) by mouth once daily. Take in the morning before breakfast.  Wait 30 minutes before eating or drinking anything 30 tablet 11    prenatal no122-iron-folic acid  mg-mcg Tab        No current facility-administered medications for this visit.        PMFSHx:    Past Medical History:   Diagnosis Date    Allergy     GERD (gastroesophageal reflux disease)     Hyperlipidemia     Neck pain 12/9/2019    Thyroid disease        Past Surgical History:   Procedure Laterality Date    APPENDECTOMY      COLONOSCOPY N/A 10/2/2018    Procedure: COLONOSCOPY;  Surgeon: Maci Sandoval MD;  Location: [facility];  " Service: Endoscopy;  Laterality: N/A;    COLONOSCOPY N/A 2/21/2020    Procedure: COLONOSCOPY;  Surgeon: Tra Pedraza MD;  Location: [facility] ENDO;  Service: General;  Laterality: N/A;    ESOPHAGOGASTRODUODENOSCOPY  02/09/2018    Dr Tra [facility]    ESOPHAGOGASTRODUODENOSCOPY N/A 2/21/2020    Procedure: ESOPHAGOGASTRODUODENOSCOPY (EGD);  Surgeon: Tra Pedraza MD;  Location: [facility] ENDO;  Service: General;  Laterality: N/A;    HYSTERECTOMY      NASAL SEPTUM SURGERY      THYROID SURGERY         Family History   Problem Relation Age of Onset    Breast cancer Neg Hx     Eczema Neg Hx     Lupus Neg Hx     Psoriasis Neg Hx     Melanoma Neg Hx        Social History     Tobacco Use    Smoking status: Never Smoker    Smokeless tobacco: Never Used   Substance Use Topics    Alcohol use: No    Drug use: No         Review of Systems    Objective:      Physical Exam      Medical Records Review:  EGD, colonoscopy, and pathology reports reviewed from 2/21/2020.  EGD revealed evidence of gastritis with adherent blood.  Colonoscopy revealed hemorrhoids and an anal fissure.  Duodenal biopsies were unremarkable.  Gastric biopsy showed evidence of chronic gastritis, Helicobacter negative.  Esophageal biopsies were unremarkable.      Assessment:         1. Generalized abdominal pain    2. Borborygmi          Plan:     Generalized abdominal pain  -     NM Hepatobiliary Imaging with GB (HIDA); Future; Expected date: 03/05/2020    Borborygmi          Follow up in about 1 month (around 4/5/2020) for results.

## 2020-03-17 ENCOUNTER — TELEPHONE (OUTPATIENT)
Dept: SURGERY | Facility: CLINIC | Age: 70
End: 2020-03-17

## 2020-03-17 NOTE — TELEPHONE ENCOUNTER
----- Message from Castillo Christensen sent at 3/17/2020  8:13 AM CDT -----  Type:  Sooner Apoointment Request    Caller is requesting a sooner appointment.  Caller declined first available appointment listed below.  Caller will not accept being placed on the waitlist and is requesting a message be sent to doctor.    Name of Caller:  Self When is the first available appointment?    Symptoms:  Sore throat  Best Call Back Number: [phone] Additional Information:  Patient has a sore throat,requesting to cancel procedure.

## 2020-03-17 NOTE — TELEPHONE ENCOUNTER
Returned phone call to pt.  Pt states that she is experiencing a sore throat.  She also states that she had nasal spur surgery, and it has caused drainage to drain down her throat constantly.  Advised pt that this is possibly why her throat is hurting.  Advised pt that her HIDA Scan is scheduled on 3-24-20, which is a week away, and that she should keep this appointment for now and wait to see how she is feeling in a couple days.  She verbalizes understanding.

## 2020-04-13 ENCOUNTER — TELEPHONE (OUTPATIENT)
Dept: SURGERY | Facility: CLINIC | Age: 70
End: 2020-04-13

## 2020-04-13 NOTE — TELEPHONE ENCOUNTER
Writer spoke to patient and let her know that she will need to contact her PCP Clare Pan and speak to her concerning a Rx for B-12. Patient expressed verbal understanding.

## 2020-04-13 NOTE — TELEPHONE ENCOUNTER
----- Message from Jake Brody sent at 4/11/2020  9:31 AM CDT -----  Contact: pt  Pt is calling to speak with a nurse in regards to vitamin B12, pls call pt back and advise   Call Back#[phone]  Thanks

## 2020-05-19 ENCOUNTER — OFFICE VISIT (OUTPATIENT)
Dept: PODIATRY | Facility: CLINIC | Age: 70
End: 2020-05-19
Payer: MEDICARE

## 2020-05-19 VITALS
DIASTOLIC BLOOD PRESSURE: 76 MMHG | TEMPERATURE: 98 F | SYSTOLIC BLOOD PRESSURE: 126 MMHG | BODY MASS INDEX: 26.06 KG/M2 | HEIGHT: 67 IN | WEIGHT: 166 LBS | HEART RATE: 84 BPM

## 2020-05-19 DIAGNOSIS — M77.31 CALCANEAL SPUR OF FOOT, RIGHT: ICD-10-CM

## 2020-05-19 DIAGNOSIS — M76.61 ACHILLES TENDINITIS OF RIGHT LOWER EXTREMITY: ICD-10-CM

## 2020-05-19 DIAGNOSIS — S86.011D ACHILLES RUPTURE, RIGHT, SUBSEQUENT ENCOUNTER: Primary | ICD-10-CM

## 2020-05-19 PROCEDURE — 1125F AMNT PAIN NOTED PAIN PRSNT: CPT | Mod: S$GLB,,, | Performed by: PODIATRIST

## 2020-05-19 PROCEDURE — 1159F MED LIST DOCD IN RCRD: CPT | Mod: S$GLB,,, | Performed by: PODIATRIST

## 2020-05-19 PROCEDURE — 99214 OFFICE O/P EST MOD 30 MIN: CPT | Mod: S$GLB,,, | Performed by: PODIATRIST

## 2020-05-19 PROCEDURE — 99999 PR PBB SHADOW E&M-EST. PATIENT-LVL IV: ICD-10-PCS | Mod: PBBFAC,,, | Performed by: PODIATRIST

## 2020-05-19 PROCEDURE — 1125F PR PAIN SEVERITY QUANTIFIED, PAIN PRESENT: ICD-10-PCS | Mod: S$GLB,,, | Performed by: PODIATRIST

## 2020-05-19 PROCEDURE — 3288F FALL RISK ASSESSMENT DOCD: CPT | Mod: CPTII,S$GLB,, | Performed by: PODIATRIST

## 2020-05-19 PROCEDURE — 3288F PR FALLS RISK ASSESSMENT DOCUMENTED: ICD-10-PCS | Mod: CPTII,S$GLB,, | Performed by: PODIATRIST

## 2020-05-19 PROCEDURE — 99999 PR PBB SHADOW E&M-EST. PATIENT-LVL IV: CPT | Mod: PBBFAC,,, | Performed by: PODIATRIST

## 2020-05-19 PROCEDURE — 3078F DIAST BP <80 MM HG: CPT | Mod: CPTII,S$GLB,, | Performed by: PODIATRIST

## 2020-05-19 PROCEDURE — 1100F PR PT FALLS ASSESS DOC 2+ FALLS/FALL W/INJURY/YR: ICD-10-PCS | Mod: CPTII,S$GLB,, | Performed by: PODIATRIST

## 2020-05-19 PROCEDURE — 99214 PR OFFICE/OUTPT VISIT, EST, LEVL IV, 30-39 MIN: ICD-10-PCS | Mod: S$GLB,,, | Performed by: PODIATRIST

## 2020-05-19 PROCEDURE — 3078F PR MOST RECENT DIASTOLIC BLOOD PRESSURE < 80 MM HG: ICD-10-PCS | Mod: CPTII,S$GLB,, | Performed by: PODIATRIST

## 2020-05-19 PROCEDURE — 3074F PR MOST RECENT SYSTOLIC BLOOD PRESSURE < 130 MM HG: ICD-10-PCS | Mod: CPTII,S$GLB,, | Performed by: PODIATRIST

## 2020-05-19 PROCEDURE — 1100F PTFALLS ASSESS-DOCD GE2>/YR: CPT | Mod: CPTII,S$GLB,, | Performed by: PODIATRIST

## 2020-05-19 PROCEDURE — 1159F PR MEDICATION LIST DOCUMENTED IN MEDICAL RECORD: ICD-10-PCS | Mod: S$GLB,,, | Performed by: PODIATRIST

## 2020-05-19 PROCEDURE — 3074F SYST BP LT 130 MM HG: CPT | Mod: CPTII,S$GLB,, | Performed by: PODIATRIST

## 2020-05-19 RX ORDER — METHYLPREDNISOLONE 4 MG/1
TABLET ORAL
COMMUNITY
Start: 2020-05-06 | End: 2020-07-27

## 2020-05-19 RX ORDER — CETIRIZINE HYDROCHLORIDE 10 MG/1
10 TABLET ORAL DAILY
COMMUNITY
Start: 2020-03-24

## 2020-05-19 RX ORDER — SIMVASTATIN 20 MG/1
TABLET, FILM COATED ORAL
COMMUNITY
Start: 2020-03-24 | End: 2020-10-22

## 2020-05-19 RX ORDER — DULOXETIN HYDROCHLORIDE 30 MG/1
CAPSULE, DELAYED RELEASE ORAL
COMMUNITY
Start: 2020-03-25 | End: 2020-10-22

## 2020-05-19 RX ORDER — HYDROCODONE BITARTRATE AND ACETAMINOPHEN 10; 325 MG/1; MG/1
1 TABLET ORAL EVERY 6 HOURS PRN
COMMUNITY
Start: 2020-05-08

## 2020-05-19 NOTE — LETTER
May 23, 2020      Clare Pan NP  [address]  [place] [address]           [facility] - Podiatry/[facility]ound [facility]  [address]  [place] [address]  Phone: [phone]  Fax: [phone]          Patient: Kathi Head   MR Number: [id number]   YOB: 1950   Date of Visit: 5/19/2020       Dear Clare Pan:    Thank you for referring Kathi Head to me for evaluation. Attached you will find relevant portions of my assessment and plan of care.    If you have questions, please do not hesitate to call me. I look forward to following Kathi Head along with you.    Sincerely,    Teofilo Plaza, JEREMÍAS    Enclosure  CC:  No Recipients    If you would like to receive this communication electronically, please contact [email or web address] or [phone] to request more information on [employer] Link access.    For providers and/or their staff who would like to refer a patient to [facility], please contact us through our one-stop-shop provider referral line, [facility], at [phone].    If you feel you have received this communication in error or would no longer like to receive these types of communications, please e-mail [email or web address]

## 2020-05-20 ENCOUNTER — HOSPITAL ENCOUNTER (OUTPATIENT)
Dept: RADIOLOGY | Facility: HOSPITAL | Age: 70
Discharge: HOME | End: 2020-05-20
Attending: PODIATRIST
Payer: MEDICARE

## 2020-05-20 DIAGNOSIS — M76.61 ACHILLES TENDINITIS OF RIGHT LOWER EXTREMITY: ICD-10-CM

## 2020-05-20 DIAGNOSIS — M77.31 CALCANEAL SPUR OF FOOT, RIGHT: ICD-10-CM

## 2020-05-21 ENCOUNTER — TELEPHONE (OUTPATIENT)
Dept: PODIATRY | Facility: CLINIC | Age: 70
End: 2020-05-21

## 2020-05-21 ENCOUNTER — HOSPITAL ENCOUNTER (OUTPATIENT)
Dept: RADIOLOGY | Facility: HOSPITAL | Age: 70
Discharge: HOME | End: 2020-05-21
Attending: PODIATRIST
Payer: MEDICARE

## 2020-05-21 PROCEDURE — 73630 X-RAY EXAM OF FOOT: CPT | Mod: 26,RT,, | Performed by: RADIOLOGY

## 2020-05-21 PROCEDURE — 73630 X-RAY EXAM OF FOOT: CPT | Mod: TC,PN,RT

## 2020-05-21 PROCEDURE — 73630 XR FOOT COMPLETE 3 VIEW RIGHT: ICD-10-PCS | Mod: 26,RT,, | Performed by: RADIOLOGY

## 2020-05-21 NOTE — TELEPHONE ENCOUNTER
----- Message from Teofilo Plaza DPM sent at 5/21/2020  9:46 AM CDT -----  Please call the patient regarding her abnormal result.  Milan advised patient I will review her x-ray with her at her preoperative visit but it does appear that the large spur on the back of her heel has fractured and pulled away further from the bone which would explain her continued discomfort.

## 2020-05-23 NOTE — PROGRESS NOTES
Subjective:       Patient ID: Kathi Head is a 69 y.o. female.    Chief Complaint: Follow-up; Heel Pain; Foot Pain; and Foot Problem   Patient presents for followup Achilles tendinitis and posterior heel spurring right  Foot Pain   Associated symptoms include arthralgias and joint swelling.   Follow-up   Associated symptoms include arthralgias and joint swelling.       Review of Systems   Musculoskeletal: Positive for arthralgias, gait problem and joint swelling.   All other systems reviewed and are negative.      Objective:      Physical Exam   Constitutional: She appears well-developed and well-nourished.   Cardiovascular: Intact distal pulses.   Pulses:       Dorsalis pedis pulses are 2+ on the right side, and 2+ on the left side.        Posterior tibial pulses are 1+ on the right side, and 1+ on the left side.   Pulmonary/Chest: Effort normal.   Musculoskeletal: She exhibits edema, tenderness and deformity.        Right foot: There is decreased range of motion and deformity.        Feet:    Feet:   Right Foot:   Protective Sensation: 4 sites tested. 4 sites sensed.   Skin Integrity: Positive for erythema and warmth.   Left Foot:   Protective Sensation: 4 sites tested. 4 sites sensed.   Neurological: She is alert.   Skin: Skin is warm. Capillary refill takes less than 2 seconds. There is erythema.   Psychiatric: She has a normal mood and affect. Her behavior is normal. Judgment and thought content normal.   Nursing note and vitals reviewed.           Following evaluation I advised the patient appears that the majority of her discomfort is at the Achilles tendon insertion not the plantar fascial insertion patient has minimal erythema and edema along the Achilles tendon and at the insertion of the Achilles tendon right foot.There is also palpable posterior calcaneal spurring noted in this region I am able to palpate the Achilles tendon so it does not show signs of being ruptured at this time.   Patient  has considerable discomfort upon palpation of the Achilles tendon insertion with palpable bony exostosis noted at the posterior aspect of the patient's right heel this elicits significant discomfort on examination.    Assessment:       1. Achilles rupture, right, subsequent encounter    2. Calcaneal spur of foot, right    3. Achilles tendinitis of right lower extremity        Plan:           Following evaluation patient has severe posterior calcaneal spurring it appears that the spur at the Achilles tendon insertion has started to fracture there is extensive spurring on plain film x-ray at the plantar posterior calcaneus as well as the midfoot right.  Patient is not able to take any anti-inflammatories she has a history of H pylori.  Patient indicated that the steroid shot that she initially got helped her for about a year the most recent steroid shot she got last fall did not give her any relief and did not help her I have advised her this is a progressive condition and it is very likely at some point she is going to need surgery and she is having severe pain that is limiting her ability to get around she needs to consider surgical intervention.   I have previously discussed surgical intervention with the patient and told the patient we need to exhaust all conservative measures 1st patient indicated conservative measures have had limited success in reducing her discomfort she states that she can walk for about 20-30 minutes before having severe pain at the back of her right heel having to stop and rest patient states that this time she would like to pursue surgical intervention to remove the posterior calcaneal spurs repair and reattached the Achilles tendon right.  Patient advised at this point it is not necessary to do an MRI she is going to need to have this area repaired whether there is a partial tear or not we will be prepared for this at the time of surgery.  I have stay tentatively schedule surgery for June  5, 2020 patient will be seen prior to this time for additional surgical consultation she has been advised to contact us with any problems questions or concerns prior to this time.  Total face-to-face time including discussion evaluation surgical consultation and discussion of treatment plan equaled 30 min.  X-rays reviewed in detail.

## 2020-05-28 ENCOUNTER — OFFICE VISIT (OUTPATIENT)
Dept: PODIATRY | Facility: CLINIC | Age: 70
End: 2020-05-28
Payer: MEDICARE

## 2020-05-28 VITALS
WEIGHT: 160 LBS | BODY MASS INDEX: 25.11 KG/M2 | HEART RATE: 73 BPM | SYSTOLIC BLOOD PRESSURE: 118 MMHG | DIASTOLIC BLOOD PRESSURE: 71 MMHG | TEMPERATURE: 98 F | HEIGHT: 67 IN

## 2020-05-28 DIAGNOSIS — R26.2 DIFFICULTY WALKING: ICD-10-CM

## 2020-05-28 DIAGNOSIS — M77.31 CALCANEAL SPUR OF FOOT, RIGHT: ICD-10-CM

## 2020-05-28 DIAGNOSIS — R26.81 UNSTEADY GAIT: ICD-10-CM

## 2020-05-28 DIAGNOSIS — S86.011D ACHILLES RUPTURE, RIGHT, SUBSEQUENT ENCOUNTER: Primary | ICD-10-CM

## 2020-05-28 PROCEDURE — 3078F DIAST BP <80 MM HG: CPT | Mod: CPTII,S$GLB,, | Performed by: PODIATRIST

## 2020-05-28 PROCEDURE — 3288F PR FALLS RISK ASSESSMENT DOCUMENTED: ICD-10-PCS | Mod: CPTII,S$GLB,, | Performed by: PODIATRIST

## 2020-05-28 PROCEDURE — 1100F PR PT FALLS ASSESS DOC 2+ FALLS/FALL W/INJURY/YR: ICD-10-PCS | Mod: CPTII,S$GLB,, | Performed by: PODIATRIST

## 2020-05-28 PROCEDURE — 1125F AMNT PAIN NOTED PAIN PRSNT: CPT | Mod: S$GLB,,, | Performed by: PODIATRIST

## 2020-05-28 PROCEDURE — 99999 PR PBB SHADOW E&M-EST. PATIENT-LVL IV: CPT | Mod: PBBFAC,,, | Performed by: PODIATRIST

## 2020-05-28 PROCEDURE — 3074F SYST BP LT 130 MM HG: CPT | Mod: CPTII,S$GLB,, | Performed by: PODIATRIST

## 2020-05-28 PROCEDURE — 3078F PR MOST RECENT DIASTOLIC BLOOD PRESSURE < 80 MM HG: ICD-10-PCS | Mod: CPTII,S$GLB,, | Performed by: PODIATRIST

## 2020-05-28 PROCEDURE — 99215 PR OFFICE/OUTPT VISIT, EST, LEVL V, 40-54 MIN: ICD-10-PCS | Mod: 57,S$GLB,, | Performed by: PODIATRIST

## 2020-05-28 PROCEDURE — 1100F PTFALLS ASSESS-DOCD GE2>/YR: CPT | Mod: CPTII,S$GLB,, | Performed by: PODIATRIST

## 2020-05-28 PROCEDURE — 3074F PR MOST RECENT SYSTOLIC BLOOD PRESSURE < 130 MM HG: ICD-10-PCS | Mod: CPTII,S$GLB,, | Performed by: PODIATRIST

## 2020-05-28 PROCEDURE — 99999 PR PBB SHADOW E&M-EST. PATIENT-LVL IV: ICD-10-PCS | Mod: PBBFAC,,, | Performed by: PODIATRIST

## 2020-05-28 PROCEDURE — 1125F PR PAIN SEVERITY QUANTIFIED, PAIN PRESENT: ICD-10-PCS | Mod: S$GLB,,, | Performed by: PODIATRIST

## 2020-05-28 PROCEDURE — 1159F MED LIST DOCD IN RCRD: CPT | Mod: S$GLB,,, | Performed by: PODIATRIST

## 2020-05-28 PROCEDURE — 1159F PR MEDICATION LIST DOCUMENTED IN MEDICAL RECORD: ICD-10-PCS | Mod: S$GLB,,, | Performed by: PODIATRIST

## 2020-05-28 PROCEDURE — 99215 OFFICE O/P EST HI 40 MIN: CPT | Mod: 57,S$GLB,, | Performed by: PODIATRIST

## 2020-05-28 PROCEDURE — 3288F FALL RISK ASSESSMENT DOCD: CPT | Mod: CPTII,S$GLB,, | Performed by: PODIATRIST

## 2020-05-28 RX ORDER — SULFAMETHOXAZOLE AND TRIMETHOPRIM 400; 80 MG/1; MG/1
2 TABLET ORAL 2 TIMES DAILY
Qty: 56 TABLET | Refills: 0 | Status: SHIPPED | OUTPATIENT
Start: 2020-05-28 | End: 2020-06-11

## 2020-05-28 RX ORDER — ONDANSETRON HYDROCHLORIDE 8 MG/1
8 TABLET, FILM COATED ORAL EVERY 8 HOURS PRN
Qty: 42 TABLET | Refills: 1 | Status: SHIPPED | OUTPATIENT
Start: 2020-05-28 | End: 2020-06-11

## 2020-05-28 RX ORDER — LORATADINE 10 MG/1
10 TABLET ORAL DAILY
COMMUNITY
End: 2020-10-22

## 2020-05-28 RX ORDER — OXYCODONE AND ACETAMINOPHEN 5; 325 MG/1; MG/1
2 TABLET ORAL
Qty: 60 TABLET | Refills: 0 | Status: SHIPPED | OUTPATIENT
Start: 2020-05-28 | End: 2020-06-02

## 2020-05-28 NOTE — LETTER
June 2, 2020      Clare Pan NP  [address]  [place] [address]           [facility] - Podiatry/Wound Care  [address]  [place] [address]  Phone: [phone]  Fax: [phone]          Patient: Kathi Head   MR Number: [id number]   YOB: 1950   Date of Visit: 5/28/2020       Dear Clare Pan:    Thank you for referring Kathi Head to me for evaluation. Attached you will find relevant portions of my assessment and plan of care.    If you have questions, please do not hesitate to call me. I look forward to following Kathi Head along with you.    Sincerely,    Teofilo Plaza, JEREMÍAS    Enclosure  CC:  No Recipients    If you would like to receive this communication electronically, please contact [email or web address] or [phone] to request more information on [facility] access.    For providers and/or their staff who would like to refer a patient to [facility], please contact us through our one-stop-shop provider referral line, [facility], at [phone].    If you feel you have received this communication in error or would no longer like to receive these types of communications, please e-mail [email or web address]

## 2020-05-28 NOTE — PATIENT INSTRUCTIONS
Nothing to eat or drink after midnight.  If you take medication for high blood pressure take this in the morning with a sip of water prior to surgery.     Arrive at out patient registration 6/5/2020 at 8:30 am

## 2020-05-29 ENCOUNTER — TELEPHONE (OUTPATIENT)
Dept: PODIATRY | Facility: CLINIC | Age: 70
End: 2020-05-29

## 2020-05-29 NOTE — TELEPHONE ENCOUNTER
Pt. Wanted to be sure prescriptions were for post op pain. Instructed the three prescriptions are for after her surgery. Understanding verbalized.

## 2020-05-29 NOTE — TELEPHONE ENCOUNTER
----- Message from Henny Guerrero sent at 5/29/2020 12:25 PM CDT -----  Contact: Dax  Type: Needs Medical Advice  Who Called:  Dax patient's   Symptoms (please be specific):    How long has patient had these symptoms:    Pharmacy name and phone #:    Best Call Back Number:   Additional Information: have questions regarding pain medication when do patient start taking it?

## 2020-06-02 NOTE — H&P (VIEW-ONLY)
Subjective:       Patient ID: Kathi Head is a 69 y.o. female.    Chief Complaint: Pre-op Exam (achilles ); Heel Pain; Follow-up; and Foot Problem   Patient presents for followup Achilles tendinitis and posterior heel spurring right.  Patient is requesting surgical intervention as previously discussed for severe Achilles tendinitis with apparent partial Achilles tendon tear at the insertion.  Follow-up   Associated symptoms include arthralgias and joint swelling.   Foot Pain   Associated symptoms include arthralgias and joint swelling.       Review of Systems   Musculoskeletal: Positive for arthralgias, gait problem and joint swelling.   All other systems reviewed and are negative.      Objective:      Physical Exam   Constitutional: She appears well-developed and well-nourished.   Cardiovascular: Normal rate, regular rhythm, normal heart sounds and intact distal pulses.   Pulses:       Dorsalis pedis pulses are 2+ on the right side, and 2+ on the left side.        Posterior tibial pulses are 1+ on the right side, and 1+ on the left side.   Pulmonary/Chest: Effort normal and breath sounds normal.   Musculoskeletal: She exhibits edema, tenderness and deformity.        Right foot: There is decreased range of motion, tenderness, bony tenderness, swelling and deformity.        Feet:    Feet:   Right Foot:   Protective Sensation: 4 sites tested. 4 sites sensed.   Skin Integrity: Positive for erythema and warmth.   Left Foot:   Protective Sensation: 4 sites tested. 4 sites sensed.   Neurological: She is alert.   Skin: Skin is warm. Capillary refill takes less than 2 seconds. There is erythema.   Psychiatric: She has a normal mood and affect. Her behavior is normal. Judgment and thought content normal.   Nursing note and vitals reviewed.                       Comprehensive metabolic panel   Order: [id number]   Status:  Final result   Visible to patient:  No (Not Released) Next appt:  06/08/2020 at 10:00 AM in  Podiatry (Teofilo Plaza DPM) Dx:  Difficulty walking; Achilles rupture,...    Ref Range & Units 08:28   Sodium 136 - 145 mmol/L 138    Potassium 3.5 - 5.1 mmol/L 3.8    Chloride 95 - 110 mmol/L 102    CO2 23 - 29 mmol/L 27    Glucose 70 - 110 mg/dL 91    BUN, Bld 8 - 23 mg/dL 11    Creatinine 0.5 - 1.4 mg/dL 0.8    Calcium 8.7 - 10.5 mg/dL 8.6Low     Total Protein 6.0 - 8.4 g/dL 7.6    Albumin 3.5 - 5.2 g/dL 4.3    Total Bilirubin 0.1 - 1.0 mg/dL 0.7    Comment: For infants and newborns, interpretation of results should be based   on gestational age, weight and in agreement with clinical   observations.   Premature Infant recommended reference ranges:   Up to 24 hours.............<8.0 mg/dL   Up to 48 hours............<12.0 mg/dL   3-5 days..................<15.0 mg/dL   6-29 days.................<15.0 mg/dL    Alkaline Phosphatase 55 - 135 U/L 46Low     AST 10 - 40 U/L 20    ALT 10 - 44 U/L 15    Anion Gap 8 - 16 mmol/L 9    eGFR if African American >60 mL/min/1.73 m^2 >60.0    eGFR if non African American >60 mL/min/1.73 m^2 >60.0    Comment: Calculation used to obtain the estimated glomerular filtration   rate (eGFR) is the CKD-EPI equation.    [facility]         Specimen Collected: 06/03/20 08:28 Last Resulted: 06/03/20 09:18           Contains abnormal data CBC auto differential   Order: [id number]   Status:  Final result   Visible to patient:  No (Not Released)   Next appt:  06/08/2020 at 10:00 AM in Podiatry (Teofilo Plaza DPM)   Dx:  Difficulty walking; Achilles rupture,...    Ref Range & Units 08:28   WBC 3.90 - 12.70 K/uL 5.35    RBC 4.00 - 5.40 M/uL 4.03    Hemoglobin 12.0 - 16.0 g/dL 12.7    Hematocrit 37.0 - 48.5 % 38.5    Mean Corpuscular Volume 82 - 98 fL 96    Mean Corpuscular Hemoglobin 27.0 - 31.0 pg 31.5High     Mean Corpuscular Hemoglobin Conc 32.0 - 36.0 g/dL 33.0    RDW 11.5 - 14.5 % 12.2    Platelets 150 - 350 K/uL 218    MPV 9.2 - 12.9 fL 10.5    Immature  Granulocytes 0.0 - 0.5 % 0.6High     Gran # (ANC) 1.8 - 7.7 K/uL 2.3    Immature Grans (Abs) 0.00 - 0.04 K/uL 0.03    Comment: Mild elevation in immature granulocytes is non specific and   can be seen in a variety of conditions including stress response,   acute inflammation, trauma and pregnancy. Correlation with other   laboratory and clinical findings is essential.    Lymph # 1.0 - 4.8 K/uL 2.4    Mono # 0.3 - 1.0 K/uL 0.4    Eos # 0.0 - 0.5 K/uL 0.1    Baso # 0.00 - 0.20 K/uL 0.03    nRBC 0 /100 WBC 0    Gran% 38.0 - 73.0 % 43.5    Lymph% 18.0 - 48.0 % 45.2    Mono% 4.0 - 15.0 % 7.7    Eosinophil% 0.0 - 8.0 % 2.4    Basophil% 0.0 - 1.9 % 0.6    Differential Method  Automated    Resulting Agency  [facility]         Specimen Collected: 06/03/20 08:28 Last Resulted: 06/03/20 09:06               Assessment:       1. Achilles rupture, right, subsequent encounter    2. Calcaneal spur of foot, right    3. Difficulty walking    4. Unsteady gait        Plan:           Following evaluation patient has severe posterior calcaneal spurring it appears that the spur at the Achilles tendon insertion has started to fracture there is extensive spurring on plain film x-ray at the plantar posterior calcaneus as well as the midfoot right.  Patient is not able to take any anti-inflammatories she has a history of H pylori.  Patient indicated that the steroid shot that she initially got helped her for about a year the most recent steroid shot she got last fall did not give her any relief and did not help her I have advised her this is a progressive condition and it is very likely at some point she is going to need surgery and she is having severe pain that is limiting her ability to get around she needs to consider surgical intervention.   I have previously discussed surgical intervention with the patient and told the patient we need to exhaust all conservative measures 1st patient indicated conservative measures have had limited  success in reducing her discomfort she states that she can walk for about 20-30 minutes before having severe pain at the back of her right heel having to stop and rest patient states that this time she would like to pursue surgical intervention to remove the posterior calcaneal spurs repair and reattached the Achilles tendon right.  Patient may decision for surgery today.Patient presents today for preoperative history and physical in discussion all aspects of surgery were discussed with the patient no guarantees were given written or implied all potential complications including but not limited to delayed healing nonhealing postoperative pain infection recurrence were discussed with the patient in detail. All aspect of the patient's recovery time involved in recovery patient responsibility is involving recovery were also discussed in detail. Patient advised failure to comply with postoperative care will jeopardize surgical outcome.  All aspects of surgical intervention were discussed at length and in detail patient understands that she is going to be nonweightbearing for a period of 6 weeks followed by several weeks of partial weight-bearing in a fracture boot before gradual return to activity.  Patient was in agreement with this and signed a consent form agreeing to surgical repair of the Achilles tendon with excision posterior calcaneal exostosis right.  Preoperative lab work has been ordered this will be evaluated prior to surgery a COVID-19 test will be performed prior to this elective procedure.  Patient was dispensed prescriptions for Percocet Zofran and Bactrim as well as a roll later walker and a knee walker she can use which she prefers to maintain her nonweightbearing status.  Patient's surgery is scheduled for June 5, 2020 she has been advised to contact us with any problems questions or concerns prior to surgical intervention.  Patient understands the importance of her compliance postoperatively and at  this point conservative measures have failed she has elected to pursue surgical intervention.    Total face-to-face time including discussion evaluation surgical consultation and discussion of treatment plan equaled 60 min.  X-rays reviewed in detail.  Preoperative lab work will be reviewed prior to surgery.  This note was created using [employer] voice recognition software that occasionally misinterpreted phrases or words.

## 2020-06-02 NOTE — PROGRESS NOTES
Subjective:       Patient ID: Kathi Head is a 69 y.o. female.    Chief Complaint: Pre-op Exam (achilles ); Heel Pain; Follow-up; and Foot Problem   Patient presents for followup Achilles tendinitis and posterior heel spurring right.  Patient is requesting surgical intervention as previously discussed for severe Achilles tendinitis with apparent partial Achilles tendon tear at the insertion.  Follow-up   Associated symptoms include arthralgias and joint swelling.   Foot Pain   Associated symptoms include arthralgias and joint swelling.       Review of Systems   Musculoskeletal: Positive for arthralgias, gait problem and joint swelling.   All other systems reviewed and are negative.      Objective:      Physical Exam   Constitutional: She appears well-developed and well-nourished.   Cardiovascular: Normal rate, regular rhythm, normal heart sounds and intact distal pulses.   Pulses:       Dorsalis pedis pulses are 2+ on the right side, and 2+ on the left side.        Posterior tibial pulses are 1+ on the right side, and 1+ on the left side.   Pulmonary/Chest: Effort normal and breath sounds normal.   Musculoskeletal: She exhibits edema, tenderness and deformity.        Right foot: There is decreased range of motion, tenderness, bony tenderness, swelling and deformity.        Feet:    Feet:   Right Foot:   Protective Sensation: 4 sites tested. 4 sites sensed.   Skin Integrity: Positive for erythema and warmth.   Left Foot:   Protective Sensation: 4 sites tested. 4 sites sensed.   Neurological: She is alert.   Skin: Skin is warm. Capillary refill takes less than 2 seconds. There is erythema.   Psychiatric: She has a normal mood and affect. Her behavior is normal. Judgment and thought content normal.   Nursing note and vitals reviewed.                       Comprehensive metabolic panel   Order: [id number]   Status:  Final result   Visible to patient:  No (Not Released) Next appt:  06/08/2020 at 10:00 AM in  Podiatry (Teofilo Plaza DPM) Dx:  Difficulty walking; Achilles rupture,...    Ref Range & Units 08:28   Sodium 136 - 145 mmol/L 138    Potassium 3.5 - 5.1 mmol/L 3.8    Chloride 95 - 110 mmol/L 102    CO2 23 - 29 mmol/L 27    Glucose 70 - 110 mg/dL 91    BUN, Bld 8 - 23 mg/dL 11    Creatinine 0.5 - 1.4 mg/dL 0.8    Calcium 8.7 - 10.5 mg/dL 8.6Low     Total Protein 6.0 - 8.4 g/dL 7.6    Albumin 3.5 - 5.2 g/dL 4.3    Total Bilirubin 0.1 - 1.0 mg/dL 0.7    Comment: For infants and newborns, interpretation of results should be based   on gestational age, weight and in agreement with clinical   observations.   Premature Infant recommended reference ranges:   Up to 24 hours.............<8.0 mg/dL   Up to 48 hours............<12.0 mg/dL   3-5 days..................<15.0 mg/dL   6-29 days.................<15.0 mg/dL    Alkaline Phosphatase 55 - 135 U/L 46Low     AST 10 - 40 U/L 20    ALT 10 - 44 U/L 15    Anion Gap 8 - 16 mmol/L 9    eGFR if African American >60 mL/min/1.73 m^2 >60.0    eGFR if non African American >60 mL/min/1.73 m^2 >60.0    Comment: Calculation used to obtain the estimated glomerular filtration   rate (eGFR) is the CKD-EPI equation.    [facility]         Specimen Collected: 06/03/20 08:28 Last Resulted: 06/03/20 09:18           Contains abnormal data CBC auto differential   Order: [id number]   Status:  Final result   Visible to patient:  No (Not Released)   Next appt:  06/08/2020 at 10:00 AM in Podiatry (Teofilo Plaza DPM)   Dx:  Difficulty walking; Achilles rupture,...    Ref Range & Units 08:28   WBC 3.90 - 12.70 K/uL 5.35    RBC 4.00 - 5.40 M/uL 4.03    Hemoglobin 12.0 - 16.0 g/dL 12.7    Hematocrit 37.0 - 48.5 % 38.5    Mean Corpuscular Volume 82 - 98 fL 96    Mean Corpuscular Hemoglobin 27.0 - 31.0 pg 31.5High     Mean Corpuscular Hemoglobin Conc 32.0 - 36.0 g/dL 33.0    RDW 11.5 - 14.5 % 12.2    Platelets 150 - 350 K/uL 218    MPV 9.2 - 12.9 fL 10.5    Immature  Granulocytes 0.0 - 0.5 % 0.6High     Gran # (ANC) 1.8 - 7.7 K/uL 2.3    Immature Grans (Abs) 0.00 - 0.04 K/uL 0.03    Comment: Mild elevation in immature granulocytes is non specific and   can be seen in a variety of conditions including stress response,   acute inflammation, trauma and pregnancy. Correlation with other   laboratory and clinical findings is essential.    Lymph # 1.0 - 4.8 K/uL 2.4    Mono # 0.3 - 1.0 K/uL 0.4    Eos # 0.0 - 0.5 K/uL 0.1    Baso # 0.00 - 0.20 K/uL 0.03    nRBC 0 /100 WBC 0    Gran% 38.0 - 73.0 % 43.5    Lymph% 18.0 - 48.0 % 45.2    Mono% 4.0 - 15.0 % 7.7    Eosinophil% 0.0 - 8.0 % 2.4    Basophil% 0.0 - 1.9 % 0.6    Differential Method  Automated    Resulting Agency  [facility]         Specimen Collected: 06/03/20 08:28 Last Resulted: 06/03/20 09:06               Assessment:       1. Achilles rupture, right, subsequent encounter    2. Calcaneal spur of foot, right    3. Difficulty walking    4. Unsteady gait        Plan:           Following evaluation patient has severe posterior calcaneal spurring it appears that the spur at the Achilles tendon insertion has started to fracture there is extensive spurring on plain film x-ray at the plantar posterior calcaneus as well as the midfoot right.  Patient is not able to take any anti-inflammatories she has a history of H pylori.  Patient indicated that the steroid shot that she initially got helped her for about a year the most recent steroid shot she got last fall did not give her any relief and did not help her I have advised her this is a progressive condition and it is very likely at some point she is going to need surgery and she is having severe pain that is limiting her ability to get around she needs to consider surgical intervention.   I have previously discussed surgical intervention with the patient and told the patient we need to exhaust all conservative measures 1st patient indicated conservative measures have had limited  success in reducing her discomfort she states that she can walk for about 20-30 minutes before having severe pain at the back of her right heel having to stop and rest patient states that this time she would like to pursue surgical intervention to remove the posterior calcaneal spurs repair and reattached the Achilles tendon right.  Patient may decision for surgery today.Patient presents today for preoperative history and physical in discussion all aspects of surgery were discussed with the patient no guarantees were given written or implied all potential complications including but not limited to delayed healing nonhealing postoperative pain infection recurrence were discussed with the patient in detail. All aspect of the patient's recovery time involved in recovery patient responsibility is involving recovery were also discussed in detail. Patient advised failure to comply with postoperative care will jeopardize surgical outcome.  All aspects of surgical intervention were discussed at length and in detail patient understands that she is going to be nonweightbearing for a period of 6 weeks followed by several weeks of partial weight-bearing in a fracture boot before gradual return to activity.  Patient was in agreement with this and signed a consent form agreeing to surgical repair of the Achilles tendon with excision posterior calcaneal exostosis right.  Preoperative lab work has been ordered this will be evaluated prior to surgery a COVID-19 test will be performed prior to this elective procedure.  Patient was dispensed prescriptions for Percocet Zofran and Bactrim as well as a roll later walker and a knee walker she can use which she prefers to maintain her nonweightbearing status.  Patient's surgery is scheduled for June 5, 2020 she has been advised to contact us with any problems questions or concerns prior to surgical intervention.  Patient understands the importance of her compliance postoperatively and at  this point conservative measures have failed she has elected to pursue surgical intervention.    Total face-to-face time including discussion evaluation surgical consultation and discussion of treatment plan equaled 60 min.  X-rays reviewed in detail.  Preoperative lab work will be reviewed prior to surgery.  This note was created using [employer] voice recognition software that occasionally misinterpreted phrases or words.

## 2020-06-03 ENCOUNTER — HOSPITAL ENCOUNTER (OUTPATIENT)
Dept: PREADMISSION TESTING | Facility: HOSPITAL | Age: 70
Discharge: HOME | End: 2020-06-03
Attending: PODIATRIST
Payer: MEDICARE

## 2020-06-03 RX ORDER — SODIUM CHLORIDE, SODIUM LACTATE, POTASSIUM CHLORIDE, CALCIUM CHLORIDE 600; 310; 30; 20 MG/100ML; MG/100ML; MG/100ML; MG/100ML
INJECTION, SOLUTION INTRAVENOUS CONTINUOUS
Status: CANCELLED | OUTPATIENT
Start: 2020-06-05

## 2020-06-04 ENCOUNTER — TELEPHONE (OUTPATIENT)
Dept: PODIATRY | Facility: CLINIC | Age: 70
End: 2020-06-04

## 2020-06-08 ENCOUNTER — TELEPHONE (OUTPATIENT)
Dept: PODIATRY | Facility: CLINIC | Age: 70
End: 2020-06-08

## 2020-06-08 NOTE — TELEPHONE ENCOUNTER
----- Message from Teofilo Plaza DPM sent at 6/8/2020 12:47 PM CDT -----  Please call the patient and advise she needs to be at the hospital for 830 Friday morning we have change the schedule around a little bit and she will be having surgery earlier than previously scheduled.

## 2020-06-08 NOTE — TELEPHONE ENCOUNTER
Other nurse in office advised pt of time change on surgery and reminded her of covid testing. Pt verbalized understanding

## 2020-06-09 ENCOUNTER — LAB VISIT (OUTPATIENT)
Dept: FAMILY MEDICINE | Facility: CLINIC | Age: 70
End: 2020-06-09
Payer: MEDICARE

## 2020-06-09 ENCOUNTER — TELEPHONE (OUTPATIENT)
Dept: PODIATRY | Facility: CLINIC | Age: 70
End: 2020-06-09

## 2020-06-09 DIAGNOSIS — S86.011D ACHILLES RUPTURE, RIGHT, SUBSEQUENT ENCOUNTER: ICD-10-CM

## 2020-06-09 DIAGNOSIS — S86.011D ACHILLES RUPTURE, RIGHT, SUBSEQUENT ENCOUNTER: Primary | ICD-10-CM

## 2020-06-09 PROCEDURE — U0003 INFECTIOUS AGENT DETECTION BY NUCLEIC ACID (DNA OR RNA); SEVERE ACUTE RESPIRATORY SYNDROME CORONAVIRUS 2 (SARS-COV-2) (CORONAVIRUS DISEASE [COVID-19]), AMPLIFIED PROBE TECHNIQUE, MAKING USE OF HIGH THROUGHPUT TECHNOLOGIES AS DESCRIBED BY CMS-2020-01-R: HCPCS

## 2020-06-09 NOTE — TELEPHONE ENCOUNTER
Please put in another covid test order for patient. She was tested again today and they are requesting another order. Thanks

## 2020-06-10 LAB — SARS-COV-2 RNA RESP QL NAA+PROBE: NOT DETECTED

## 2020-06-11 ENCOUNTER — TELEPHONE (OUTPATIENT)
Dept: PODIATRY | Facility: CLINIC | Age: 70
End: 2020-06-11

## 2020-06-11 ENCOUNTER — ANESTHESIA EVENT (OUTPATIENT)
Dept: SURGERY | Facility: HOSPITAL | Age: 70
End: 2020-06-11
Payer: MEDICARE

## 2020-06-12 ENCOUNTER — ANESTHESIA (OUTPATIENT)
Dept: SURGERY | Facility: HOSPITAL | Age: 70
End: 2020-06-12
Payer: MEDICARE

## 2020-06-12 ENCOUNTER — HOSPITAL ENCOUNTER (OUTPATIENT)
Facility: HOSPITAL | Age: 70
Discharge: HOME | End: 2020-06-12
Attending: PODIATRIST | Admitting: PODIATRIST
Payer: MEDICARE

## 2020-06-12 ENCOUNTER — TELEPHONE (OUTPATIENT)
Dept: PODIATRY | Facility: CLINIC | Age: 70
End: 2020-06-12

## 2020-06-12 DIAGNOSIS — S86.011D ACHILLES RUPTURE, RIGHT, SUBSEQUENT ENCOUNTER: Primary | ICD-10-CM

## 2020-06-12 DIAGNOSIS — M76.61 ACHILLES TENDINITIS OF RIGHT LOWER EXTREMITY: ICD-10-CM

## 2020-06-12 DIAGNOSIS — M77.31 CALCANEAL SPUR OF FOOT, RIGHT: ICD-10-CM

## 2020-06-12 PROCEDURE — 25000003 PHARM REV CODE 250

## 2020-06-12 PROCEDURE — D9220A PRA ANESTHESIA: Mod: ANES,,, | Performed by: ANESTHESIOLOGY

## 2020-06-12 PROCEDURE — 27654 PR REPAIR ACHILLES TENDON,SECONDARY: ICD-10-PCS | Mod: RT,,, | Performed by: PODIATRIST

## 2020-06-12 PROCEDURE — S0020 INJECTION, BUPIVICAINE HYDRO: HCPCS

## 2020-06-12 PROCEDURE — 71000015 HC POSTOP RECOV 1ST HR: Performed by: PODIATRIST

## 2020-06-12 PROCEDURE — 63600175 PHARM REV CODE 636 W HCPCS: Performed by: PODIATRIST

## 2020-06-12 PROCEDURE — 63600175 PHARM REV CODE 636 W HCPCS: Performed by: NURSE ANESTHETIST, CERTIFIED REGISTERED

## 2020-06-12 PROCEDURE — D9220A PRA ANESTHESIA: ICD-10-PCS | Mod: ANES,,, | Performed by: ANESTHESIOLOGY

## 2020-06-12 PROCEDURE — 25000003 PHARM REV CODE 250: Performed by: NURSE ANESTHETIST, CERTIFIED REGISTERED

## 2020-06-12 PROCEDURE — 36000709 HC OR TIME LEV III EA ADD 15 MIN: Performed by: PODIATRIST

## 2020-06-12 PROCEDURE — 27654 REPAIR OF ACHILLES TENDON: CPT | Mod: RT,,, | Performed by: PODIATRIST

## 2020-06-12 PROCEDURE — 28120 PART REMOVAL OF ANKLE/HEEL: CPT | Mod: 51,RT,, | Performed by: PODIATRIST

## 2020-06-12 PROCEDURE — 28120 PR PART REMV TALUS OR CALCANEUS: ICD-10-PCS | Mod: 51,RT,, | Performed by: PODIATRIST

## 2020-06-12 PROCEDURE — 27201423 OPTIME MED/SURG SUP & DEVICES STERILE SUPPLY: Performed by: PODIATRIST

## 2020-06-12 PROCEDURE — 37000009 HC ANESTHESIA EA ADD 15 MINS: Performed by: PODIATRIST

## 2020-06-12 PROCEDURE — 71000033 HC RECOVERY, INTIAL HOUR: Performed by: PODIATRIST

## 2020-06-12 PROCEDURE — C1713 ANCHOR/SCREW BN/BN,TIS/BN: HCPCS | Performed by: PODIATRIST

## 2020-06-12 PROCEDURE — D9220A PRA ANESTHESIA: Mod: CRNA,,, | Performed by: NURSE ANESTHETIST, CERTIFIED REGISTERED

## 2020-06-12 PROCEDURE — 36000708 HC OR TIME LEV III 1ST 15 MIN: Performed by: PODIATRIST

## 2020-06-12 PROCEDURE — 63600175 PHARM REV CODE 636 W HCPCS: Performed by: ANESTHESIOLOGY

## 2020-06-12 PROCEDURE — 25000003 PHARM REV CODE 250: Performed by: PODIATRIST

## 2020-06-12 PROCEDURE — 37000008 HC ANESTHESIA 1ST 15 MINUTES: Performed by: PODIATRIST

## 2020-06-12 PROCEDURE — D9220A PRA ANESTHESIA: ICD-10-PCS | Mod: CRNA,,, | Performed by: NURSE ANESTHETIST, CERTIFIED REGISTERED

## 2020-06-12 DEVICE — SYS IMPL BIOC ACHILLES SPEEDB: Type: IMPLANTABLE DEVICE | Site: FOOT | Status: FUNCTIONAL

## 2020-06-12 RX ORDER — GLYCOPYRROLATE 0.2 MG/ML
INJECTION INTRAMUSCULAR; INTRAVENOUS
Status: DISCONTINUED | OUTPATIENT
Start: 2020-06-12 | End: 2020-06-12

## 2020-06-12 RX ORDER — SODIUM CHLORIDE, SODIUM LACTATE, POTASSIUM CHLORIDE, CALCIUM CHLORIDE 600; 310; 30; 20 MG/100ML; MG/100ML; MG/100ML; MG/100ML
INJECTION, SOLUTION INTRAVENOUS CONTINUOUS
Status: DISCONTINUED | OUTPATIENT
Start: 2020-06-12 | End: 2020-06-12 | Stop reason: HOSPADM

## 2020-06-12 RX ORDER — MORPHINE SULFATE 4 MG/ML
2 INJECTION, SOLUTION INTRAMUSCULAR; INTRAVENOUS EVERY 5 MIN PRN
Status: DISCONTINUED | OUTPATIENT
Start: 2020-06-12 | End: 2020-06-12 | Stop reason: HOSPADM

## 2020-06-12 RX ORDER — LIDOCAINE HYDROCHLORIDE 10 MG/ML
INJECTION INFILTRATION; PERINEURAL
Status: DISCONTINUED | OUTPATIENT
Start: 2020-06-12 | End: 2020-06-12 | Stop reason: HOSPADM

## 2020-06-12 RX ORDER — CEFAZOLIN SODIUM 1 G/50ML
1 SOLUTION INTRAVENOUS
Status: COMPLETED | OUTPATIENT
Start: 2020-06-12 | End: 2020-06-12

## 2020-06-12 RX ORDER — LIDOCAINE HYDROCHLORIDE 10 MG/ML
1 INJECTION, SOLUTION EPIDURAL; INFILTRATION; INTRACAUDAL; PERINEURAL ONCE
Status: DISCONTINUED | OUTPATIENT
Start: 2020-06-12 | End: 2020-06-12 | Stop reason: HOSPADM

## 2020-06-12 RX ORDER — SODIUM CHLORIDE, SODIUM LACTATE, POTASSIUM CHLORIDE, CALCIUM CHLORIDE 600; 310; 30; 20 MG/100ML; MG/100ML; MG/100ML; MG/100ML
125 INJECTION, SOLUTION INTRAVENOUS CONTINUOUS
Status: DISCONTINUED | OUTPATIENT
Start: 2020-06-12 | End: 2020-06-12 | Stop reason: HOSPADM

## 2020-06-12 RX ORDER — PROPOFOL 10 MG/ML
VIAL (ML) INTRAVENOUS
Status: DISCONTINUED | OUTPATIENT
Start: 2020-06-12 | End: 2020-06-12

## 2020-06-12 RX ORDER — MIDAZOLAM HYDROCHLORIDE 1 MG/ML
INJECTION, SOLUTION INTRAMUSCULAR; INTRAVENOUS
Status: DISCONTINUED | OUTPATIENT
Start: 2020-06-12 | End: 2020-06-12

## 2020-06-12 RX ORDER — ONDANSETRON 2 MG/ML
4 INJECTION INTRAMUSCULAR; INTRAVENOUS DAILY PRN
Status: DISCONTINUED | OUTPATIENT
Start: 2020-06-12 | End: 2020-06-12 | Stop reason: HOSPADM

## 2020-06-12 RX ORDER — BUPIVACAINE HYDROCHLORIDE 5 MG/ML
INJECTION, SOLUTION PERINEURAL
Status: DISCONTINUED | OUTPATIENT
Start: 2020-06-12 | End: 2020-06-12 | Stop reason: HOSPADM

## 2020-06-12 RX ORDER — KETOROLAC TROMETHAMINE 30 MG/ML
15 INJECTION, SOLUTION INTRAMUSCULAR; INTRAVENOUS ONCE
Status: COMPLETED | OUTPATIENT
Start: 2020-06-12 | End: 2020-06-12

## 2020-06-12 RX ORDER — SUCCINYLCHOLINE CHLORIDE 20 MG/ML
INJECTION INTRAMUSCULAR; INTRAVENOUS
Status: DISCONTINUED | OUTPATIENT
Start: 2020-06-12 | End: 2020-06-12

## 2020-06-12 RX ORDER — OXYCODONE AND ACETAMINOPHEN 5; 325 MG/1; MG/1
1 TABLET ORAL
Status: DISCONTINUED | OUTPATIENT
Start: 2020-06-12 | End: 2020-06-12 | Stop reason: HOSPADM

## 2020-06-12 RX ADMIN — KETOROLAC TROMETHAMINE 15 MG: 30 INJECTION, SOLUTION INTRAMUSCULAR at 12:06

## 2020-06-12 RX ADMIN — SODIUM CHLORIDE, POTASSIUM CHLORIDE, SODIUM LACTATE AND CALCIUM CHLORIDE: 600; 310; 30; 20 INJECTION, SOLUTION INTRAVENOUS at 10:06

## 2020-06-12 RX ADMIN — SUCCINYLCHOLINE CHLORIDE 100 MG: 20 INJECTION, SOLUTION INTRAMUSCULAR; INTRAVENOUS at 10:06

## 2020-06-12 RX ADMIN — MIDAZOLAM HYDROCHLORIDE 2 MG: 1 INJECTION, SOLUTION INTRAMUSCULAR; INTRAVENOUS at 10:06

## 2020-06-12 RX ADMIN — MORPHINE SULFATE 2 MG: 4 INJECTION INTRAVENOUS at 12:06

## 2020-06-12 RX ADMIN — ONDANSETRON 4 MG: 2 INJECTION INTRAMUSCULAR; INTRAVENOUS at 12:06

## 2020-06-12 RX ADMIN — SODIUM CHLORIDE, POTASSIUM CHLORIDE, SODIUM LACTATE AND CALCIUM CHLORIDE: 600; 310; 30; 20 INJECTION, SOLUTION INTRAVENOUS at 11:06

## 2020-06-12 RX ADMIN — CEFAZOLIN SODIUM 1 G: 1 SOLUTION INTRAVENOUS at 10:06

## 2020-06-12 RX ADMIN — GLYCOPYRROLATE 0.4 MG: 0.2 INJECTION INTRAMUSCULAR; INTRAVENOUS at 11:06

## 2020-06-12 RX ADMIN — PROPOFOL 200 MG: 10 INJECTION, EMULSION INTRAVENOUS at 10:06

## 2020-06-12 NOTE — OP NOTE
[facility] - Periop Services  Operative Note     SUMMARY     Surgery Date: 6/12/2020       Pre-op Diagnosis:  Calcaneal spur of foot, right [M77.31]  Achilles tendinitis of right lower extremity [M76.61]  Achilles rupture, right, subsequent encounter [S86.011D]    Post-op Diagnosis:  Post-Op Diagnosis Codes:     * Calcaneal spur of foot, right [M77.31]     * Achilles tendinitis of right lower extremity [M76.61]     * Achilles rupture, right, subsequent encounter [S86.011D]    Procedure(s) (LRB):  REPAIR, TENDON, ACHILLES (Right)  Secondary repair Achilles tendon longitudinal rupture right procedure code 53051  Excision posterior calcaneal exostosis and Alvarado's deformity right procedure code 65600  Surgeon(s) and Role:     * Teofilo Plaza DPM - Primary      Estimated Blood Loss:  Minimal  Hemostasis:  Ankle tourniquet placed to 250 mm of mercury for a period of 45 min  Materials Arthrex Speed Bridge tendon repair system 4.0 Vicryl 4.0 Monocryl sterile irrigant  Anesthesia:  General anesthesia in conjunction with local infiltrate equaling 20 cc of 1% lidocaine plain  Injectables 30 cc of 0.5% Marcaine plain  Pathology none  Condition stable  Complications none    Description of the findings of the procedure:  Calcaneal spur of foot, right [M77.31]  Achilles tendinitis of right lower extremity [M76.61]  Achilles rupture, right, subsequent encounter [S86.011D]         Specimens (From admission, onward)    None           Procedure:  Patient was taken the operating room and placed in supine position on the OR table patient was then placed under general anesthesia and subsequently rotated to a prone position and Webril cast padding was placed around the patient's right ankle as was an ankle tourniquet patient was locally anesthetized in a regional block of the posterior aspect of the patient's right heel utilizing a total of 20 cc of 1% lidocaine plain.  Following this the area was then  prepped and draped in the usual sterile manner intraoperative fluoroscopy was used to plan the inverted T incision overlying the posterior aspect of the patient's right heel and incision was initially created immediately upon making the incision I dissected down to the level of the Achilles there was tearing longitudinal of the Achilles tendon from the insertion on the bone to just below the watershed area the Achilles had a significantly visualized split however this was not easily noted with the patient's foot in a relaxed position however upon dorsiflexion the longitudinal tear was better visualized the Achilles tendon was released from its insertion point on the posterior calcaneus this revealed an extensive large partially detached posterior calcaneal spur an osteotome and mallet was utilized to reach 8 the posterior aspect of the patient's right calcaneus and completely resect the bony spurring scar tissue surrounding the Achilles tendon was also removed from the for surgical field a power rasp was used to recon to her and prep the posterior calcaneus for reattachment of the Achilles tendon.  Per manufacture guidelines the Arthrex Speed Bridge tendon repair system was utilized to reattached the Achilles tendon onto its new insertion point after having achieved this patient had complete range of motion with no limitation at the Achilles tendon insertion the area was thoroughly flushed irrigated with copious amounts of sterile saline deep closure was achieved with 3.0 Vicryl in a simple interrupted fashion 4.0 Vicryl was used in a simple interrupted fashion to close intermediate layers of the surgical site and 4.0 Monocryl was using a simple interrupted fashion to close skin layers of the surgical site patient was injected with an additional 30 cc of 0.5% Marcaine plain to create a long-term postoperative anesthetic jump start dressing was placed over the incision just prior to doing this the ankle tourniquet  was lowered and removed no significant bleeding noted sterile 4x4s ABD Kerlix were used to dress the area patient was then rotated back to a supine position and a lower leg fiberglass cast was applied.  Patient left the operating room with vital signs stable and vascular status having return to the patient's normal preoperative levels.  Patient will maintain complete nonweightbearing status for the next 6 weeks.This note was created using M*Modal voice recognition software that occasionally misinterpreted phrases or words.

## 2020-06-12 NOTE — INTERVAL H&P NOTE
The patient has been examined and the H&P has been reviewed:    I concur with the findings and no changes have occurred since H&P was written.    Anesthesia/Surgery risks, benefits and alternative options discussed and understood by patient/family.          Active Hospital Problems    Diagnosis  POA    Achilles rupture, right, subsequent encounter [S86.011D]  Not Applicable    Calcaneal spur of foot, right [M77.31]  Yes      Resolved Hospital Problems   No resolved problems to display.

## 2020-06-12 NOTE — TELEPHONE ENCOUNTER
All instructions reviewed with pt and understanding verbalized. No complaints at this time and reminded pt of appointment for Monday at 10:45.

## 2020-06-12 NOTE — BRIEF OP NOTE
[facility] - Periop Services  Brief Operative Note    Surgery Date: 6/12/2020     Surgeon(s) and Role:     * Teofilo Plaza DPM - Primary    Assisting Surgeon: None    Pre-op Diagnosis:  Calcaneal spur of foot, right [M77.31]  Achilles tendinitis of right lower extremity [M76.61]  Achilles rupture, right, subsequent encounter [S86.011D]    Post-op Diagnosis:  Post-Op Diagnosis Codes:     * Calcaneal spur of foot, right [M77.31]     * Achilles tendinitis of right lower extremity [M76.61]     * Achilles rupture, right, subsequent encounter [S86.011D]    Procedure(s) (LRB):  REPAIR, TENDON, ACHILLES (Right)    Anesthesia: Choice    Description of the findings of the procedure(s):     Estimated Blood Loss: * No values recorded between 6/12/2020 10:47 AM and 6/12/2020 11:52 AM *         Specimens:   Specimen (12h ago, onward)    None            Discharge Note    OUTCOME: Patient tolerated treatment/procedure well without complication and is now ready for discharge.    DISPOSITION: Home or Self Care    FINAL DIAGNOSIS:  <principal problem not specified>    FOLLOWUP: In clinic    DISCHARGE INSTRUCTIONS:    Discharge Procedure Orders   Keep surgical extremity elevated     Ice to affected area     Lifting restrictions     Weight bearing restrictions (specify):

## 2020-06-12 NOTE — PLAN OF CARE
PT TO RECOVERY FROM OR , PT CONNECTED TO MONITOR, IV INTACT,AND INFUSING,  CAST NOTED TO RIGHRT LEG, LEG ELEVATED ON PILLOW, ICE PACK APPLIED.. PT AWAKENS TO VERBAL/ TACTILE STIMULI.,VITALS STABLE, WILL CONTINUE TO MONITOR

## 2020-06-12 NOTE — DISCHARGE INSTRUCTIONS
Heel Spurs    The plantar fascia is a thick, fibrous layer of tissue that covers the bones on the bottom of your foot. It holds the foot bones in an arched position. Plantar fasciitis is a painful swelling of the plantar fascia.  A heel spur is an overgrowth of bone where the plantar fascia attaches to the heel bone. The heel spur itself usually doesnt cause pain. However, the heel spur might be a sign of plantar fasciitis which may cause your foot pain. There is no specific treatment for heel spurs.   Plantar fasciitis can develop slowly or suddenly. It usually affects one foot at a time. Heel pain can feel sharp, like a knife sticking into the bottom of your foot. You may feel pain after exercising, long-distance jogging, stair climbing, long periods of standing, or after standing up.  Risk factors for plantar fasciitis include: arthritis, diabetes, obesity or recent weight gain, flat foot, and having high arches. Wearing high heels, loose shoes, or shoes with poor arch support adds to the risk.    Foot pain is usually worse in the morning. But it improves with walking. By the end of the day there may be a dull aching. Treatment includes short-term rest and controlling inflammation. It may take up to 9 months before all symptoms go away. In rare cases, a steroid injection in the foot, or surgery, may be needed.  Home care  · If you are overweight, lose weight to help healing.  · Choose supportive shoes with good arch support and shock absorbency. Replace athletic shoes when they become worn out. Dont walk or run barefoot.  · Premade or custom-fitted shoe inserts may be helpful. Inserts made of silicone seem to be the most effective. Custom-made inserts can be provided by a podiatrist or foot specialist, physical therapist, or orthopedist.  · Premade or custom-made night splints keep the heel stretched out while you sleep. They may prevent morning pain.  · Avoid activities that stress the feet: jogging,  prolonged standing or walking, contact sports, etc.  · First thing in the morning and before sports, stretch the bottom of your foot. Gently flex your ankle so the toes move toward your knee.  · Icing may help control heel pain. Apply an ice pack to the heel for 10-20 minutes as a preventive. Or ice your heel after a severe flare-up of symptoms. You may repeat this every 1-2 hours as needed.  · You may use over-the-counter pain medicine to control pain, unless another medicine was prescribed. Anti-inflammatory pain medicines, such as ibuprofen or naproxen, may work better than acetaminophen. If you have chronic liver or kidney disease or ever had a stomach ulcer or GI bleeding, talk with your healthcare provider before using these medicines.  · Shoe inserts, a night splint, or a special boot may be needed. Use these as directed by your healthcare provider.  Follow-up care   Follow up with your healthcare provider, physical therapist, or podiatrist or foot specialist as advised.  When to seek medical advice  Call your healthcare provider right away if any of these occur:  · The pain worsens.  · There is no relief after a few weeks of home treatment.   Date Last Reviewed: 11/23/2015  © 7344-5387 [employer]. [address] [place] [address]. All rights reserved. This information is not intended as a substitute for professional medical care. Always follow your healthcare professional's instructions.        Understanding Achilles Tendon Repair Surgery  The Achilles tendon is a strong, fibrous cord in the back of your lower leg. It connects the muscles of your calf to your heel. Its the largest tendon in your body. It helps you walk, run, and jump. Achilles tendon repair surgery is a type of surgery to fix a damaged Achilles tendon.  Why is Achilles tendon repair surgery done?  The surgery is often needed if you have damage from a tear or rupture from a sudden (acute) injury. Or the damage may be  from overuse, wear and tear, or from other conditions. This long-term (chronic) injury is also known as tendonitis or tendinopathy.  How is Achilles tendon repair surgery done?  An Achilles tendon repair is done by an orthopedic surgeon. This is a surgeon who treats bone, muscle, joint, and tendon problems. The surgery can be done in several ways. The surgeon will make a cut (incision) through the skin and muscle in the back of your calf. If you have minimally invasive surgery, the surgeon will make several smaller incisions instead of one large one. Your surgeon will make an incision through the sheath or covering of the Achilles tendon.  If the tendon is damaged, the surgeon may remove the damaged part and repair the rest of it. If you have severe damage, the surgeon may use a muscle or tendon from your calf, ankle, or foot for the repair.  What are the risks of Achilles tendon repair surgery?  Every surgery has risks. Risks of Achilles tendon repair include:  · Bleeding  · Nerve damage  · Infection  · Blood clots  · Wound healing problems  · Calf weakness  · Complications from anesthesia  · Continued pain in your foot and ankle  Risks depend on factors such as your age, your overall health, and the type of surgery. They also depend on the shape of your foot, muscles, and tendons. Ask your healthcare provider which risks apply most to you. Talk with him or her about any concerns you have.  Date Last Reviewed: 2/1/2017 © 2000-2017 [employer]. [address] [place] [address]. All rights reserved. This information is not intended as a substitute for professional medical care. Always follow your healthcare professional's instructions.        Discharge Instructions: After Your Surgery  Youve just had surgery. During surgery, you were given medicine called anesthesia to keep you relaxed and free of pain. After surgery, you may have some pain or nausea. This is common. Here are some tips for  feeling better and getting well after surgery.     Stay on schedule with your medicine.   Going home  Your healthcare provider will show you how to take care of yourself when you go home. He or she will also answer your questions. Have an adult family member or friend drive you home. For the first 24 hours after your surgery:  · Do not drive or use heavy equipment.  · Do not make important decisions or sign legal papers.  · Do not drink alcohol.  · Have someone stay with you, if needed. He or she can watch for problems and help keep you safe.  Be sure to go to all follow-up visits with your healthcare provider. And rest after your surgery for as long as your healthcare provider tells you to.  Coping with pain  If you have pain after surgery, pain medicine will help you feel better. Take it as told, before pain becomes severe. Also, ask your healthcare provider or pharmacist about other ways to control pain. This might be with heat, ice, or relaxation. And follow any other instructions your surgeon or nurse gives you.  Tips for taking pain medicine  To get the best relief possible, remember these points:  · Pain medicines can upset your stomach. Taking them with a little food may help.  · Most pain relievers taken by mouth need at least 20 to 30 minutes to start to work.  · Taking medicine on a schedule can help you remember to take it. Try to time your medicine so that you can take it before starting an activity. This might be before you get dressed, go for a walk, or sit down for dinner.  · Constipation is a common side effect of pain medicines. Call your healthcare provider before taking any medicines such as laxatives or stool softeners to help ease constipation. Also ask if you should skip any foods. Drinking lots of fluids and eating foods such as fruits and vegetables that are high in fiber can also help. Remember, do not take laxatives unless your surgeon has prescribed them.  · Drinking alcohol and taking  pain medicine can cause dizziness and slow your breathing. It can even be deadly. Do not drink alcohol while taking pain medicine.  · Pain medicine can make you react more slowly to things. Do not drive or run machinery while taking pain medicine.  Your healthcare provider may tell you to take acetaminophen to help ease your pain. Ask him or her how much you are supposed to take each day. Acetaminophen or other pain relievers may interact with your prescription medicines or other over-the-counter (OTC) medicines. Some prescription medicines have acetaminophen and other ingredients. Using both prescription and OTC acetaminophen for pain can cause you to overdose. Read the labels on your OTC medicines with care. This will help you to clearly know the list of ingredients, how much to take, and any warnings. It may also help you not take too much acetaminophen. If you have questions or do not understand the information, ask your pharmacist or healthcare provider to explain it to you before you take the OTC medicine.  Managing nausea  Some people have an upset stomach after surgery. This is often because of anesthesia, pain, or pain medicine, or the stress of surgery. These tips will help you handle nausea and eat healthy foods as you get better. If you were on a special food plan before surgery, ask your healthcare provider if you should follow it while you get better. These tips may help:  · Do not push yourself to eat. Your body will tell you when to eat and how much.  · Start off with clear liquids and soup. They are easier to digest.  · Next try semi-solid foods, such as mashed potatoes, applesauce, and gelatin, as you feel ready.  · Slowly move to solid foods. Dont eat fatty, rich, or spicy foods at first.  · Do not force yourself to have 3 large meals a day. Instead eat smaller amounts more often.  · Take pain medicines with a small amount of solid food, such as crackers or toast, to avoid nausea.     Call your  surgeon if  · You still have pain an hour after taking medicine. The medicine may not be strong enough.  · You feel too sleepy, dizzy, or groggy. The medicine may be too strong.  · You have side effects like nausea, vomiting, or skin changes, such as rash, itching, or hives.       If you have obstructive sleep apnea  You were given anesthesia medicine during surgery to keep you comfortable and free of pain. After surgery, you may have more apnea spells because of this medicine and other medicines you were given. The spells may last longer than usual.   At home:  · Keep using the continuous positive airway pressure (CPAP) device when you sleep. Unless your healthcare provider tells you not to, use it when you sleep, day or night. CPAP is a common device used to treat obstructive sleep apnea.  · Talk with your provider before taking any pain medicine, muscle relaxants, or sedatives. Your provider will tell you about the possible dangers of taking these medicines.  Date Last Reviewed: 12/1/2016  © 3501-8184 [employer]. [address] [place] [address], [place] [address]. All rights reserved. This information is not intended as a substitute for professional medical care. Always follow your healthcare professional's instructions.

## 2020-06-12 NOTE — TELEPHONE ENCOUNTER
----- Message from Teofilo Plaza DPM sent at 6/12/2020 12:14 PM CDT -----  Please call the patient right before he leaves at the end of the day today she is still in recovery and very lethargic I want make sure that she understands her postoperative instructions with ice and elevation starting her aspirin tomorrow starting her antibiotics after dinner tonight and also starting her pain medication as directed.  Please remind the patient absolutely no weight at all on the right foot advised her the surgery went very well.

## 2020-06-12 NOTE — ANESTHESIA POSTPROCEDURE EVALUATION
Anesthesia Post Evaluation    Patient: Kathi Head    Procedure(s) Performed: Procedure(s) (LRB):  REPAIR, TENDON, ACHILLES (Right)    Final Anesthesia Type: general    Patient location during evaluation: PACU  Patient participation: Yes- Able to Participate  Level of consciousness: awake and awake and alert  Post-procedure vital signs: reviewed and stable  Pain management: adequate  Airway patency: patent    PONV status at discharge: No PONV  Anesthetic complications: no      Cardiovascular status: blood pressure returned to baseline  Respiratory status: unassisted and spontaneous ventilation  Hydration status: euvolemic  Follow-up not needed.          Vitals Value Taken Time   /69 6/12/2020 12:41 PM   Temp 36.1 °C (96.9 °F) 6/12/2020 12:01 PM   Pulse 74 6/12/2020 12:45 PM   Resp 20 6/12/2020 12:30 PM   SpO2 100 % 6/12/2020 12:45 PM   Vitals shown include unvalidated device data.      No case tracking events are documented in the log.      Pain/Jerilyn Score: Pain Rating Prior to Med Admin: 9 (6/12/2020 12:40 PM)  Jerilyn Score: 9 (6/12/2020 12:30 PM)

## 2020-06-12 NOTE — TRANSFER OF CARE
"Anesthesia Transfer of Care Note    Patient: Kathi Head    Procedure(s) Performed: Procedure(s) (LRB):  REPAIR, TENDON, ACHILLES (Right)    Patient location: PACU    Anesthesia Type: general    Transport from OR: Transported from OR on room air with adequate spontaneous ventilation    Post pain: adequate analgesia    Post assessment: no apparent anesthetic complications and tolerated procedure well    Post vital signs: stable    Level of consciousness: awake, alert and oriented    Nausea/Vomiting: no nausea/vomiting    Complications: none    Transfer of care protocol was followed      Last vitals:   Visit Vitals  /67   Pulse 69   Temp 36.1 °C (97 °F) (Oral)   Resp 16   Ht 5' 7" (1.702 m)   Wt 72.6 kg (160 lb)   LMP  (LMP Unknown)   SpO2 100%   Breastfeeding? No   BMI 25.06 kg/m²     "

## 2020-06-12 NOTE — ANESTHESIA PREPROCEDURE EVALUATION
06/12/2020  Kathi Head is a 69 y.o., female.    Anesthesia Evaluation    I have reviewed the Patient Summary Reports.    I have reviewed the Nursing Notes.    I have reviewed the Medications.     Review of Systems  Anesthesia Hx:  No problems with previous Anesthesia  Neg history of prior surgery. Denies Family Hx of Anesthesia complications.   Denies Personal Hx of Anesthesia complications.   Social:  Non-Smoker    Hematology/Oncology:  Hematology Normal   Oncology Normal     EENT/Dental:EENT/Dental Normal   Cardiovascular:  Cardiovascular Normal     Pulmonary:  Pulmonary Normal    Renal/:   Chronic Renal Disease    Hepatic/GI:   GERD, well controlled    Musculoskeletal:  Musculoskeletal Normal    Neurological:   Headaches    Endocrine:   Hypothyroidism    Dermatological:  Skin Normal    Psych:  Psychiatric Normal           Physical Exam  General:  Well nourished    Airway/Jaw/Neck:  Airway Findings: Mouth Opening: Normal Tongue: Normal  General Airway Assessment: Adult  Mallampati: I  TM Distance: 4 - 6 cm        Eyes/Ears/Nose:  EYES/EARS/NOSE FINDINGS: Normal   Dental:  DENTAL FINDINGS: Normal   Chest/Lungs:  Chest/Lungs Clear    Heart/Vascular:  Heart Findings: Normal Heart murmur: negative Vascular Findings: Normal    Abdomen:  Abdomen Findings: Normal    Musculoskeletal:  Musculoskeletal Findings: Normal   Skin:  Skin Findings: Normal    Mental Status:  Mental Status Findings: Normal        Anesthesia Plan  Type of Anesthesia, risks & benefits discussed:  Anesthesia Type:  general  Patient's Preference:   Intra-op Monitoring Plan: standard ASA monitors  Intra-op Monitoring Plan Comments:   Post Op Pain Control Plan:   Post Op Pain Control Plan Comments:   Induction:   IV  Beta Blocker:  Patient is not currently on a Beta-Blocker (No further documentation required).       Informed Consent:  Patient understands risks and agrees with Anesthesia plan.  Questions answered. Anesthesia consent signed with patient.  ASA Score: 2     Day of Surgery Review of History & Physical:    H&P update referred to the provider.         Ready For Surgery From Anesthesia Perspective.

## 2020-06-12 NOTE — PLAN OF CARE
PT C/O HEEL PAIN 10/10. MEDS GIVEN IV SEE.  MAR, ICE PACKS IN PLACE, LEG ELEVATED ON PILLOW. VSS, WILL CONTINUE TO MONITOR.

## 2020-06-15 ENCOUNTER — OFFICE VISIT (OUTPATIENT)
Dept: PODIATRY | Facility: CLINIC | Age: 70
End: 2020-06-15
Payer: MEDICARE

## 2020-06-15 VITALS
BODY MASS INDEX: 25.9 KG/M2 | WEIGHT: 165 LBS | TEMPERATURE: 99 F | DIASTOLIC BLOOD PRESSURE: 71 MMHG | SYSTOLIC BLOOD PRESSURE: 108 MMHG | HEIGHT: 67 IN | HEART RATE: 82 BPM

## 2020-06-15 DIAGNOSIS — M77.31 CALCANEAL SPUR OF FOOT, RIGHT: ICD-10-CM

## 2020-06-15 DIAGNOSIS — S86.011D ACHILLES RUPTURE, RIGHT, SUBSEQUENT ENCOUNTER: Primary | ICD-10-CM

## 2020-06-15 PROCEDURE — 99999 PR PBB SHADOW E&M-EST. PATIENT-LVL IV: CPT | Mod: PBBFAC,,, | Performed by: PODIATRIST

## 2020-06-15 PROCEDURE — 99024 PR POST-OP FOLLOW-UP VISIT: ICD-10-PCS | Mod: S$GLB,,, | Performed by: PODIATRIST

## 2020-06-15 PROCEDURE — 99024 POSTOP FOLLOW-UP VISIT: CPT | Mod: S$GLB,,, | Performed by: PODIATRIST

## 2020-06-15 PROCEDURE — 99999 PR PBB SHADOW E&M-EST. PATIENT-LVL IV: ICD-10-PCS | Mod: PBBFAC,,, | Performed by: PODIATRIST

## 2020-06-15 NOTE — LETTER
June 20, 2020      Clare Pan NP  [address]  [place] [address]           [facility]  [address] [facility] [place] [address]  Phone: [phone]  Fax: [phone]          Patient: Kathi Head   MR Number: [id number]   YOB: 1950   Date of Visit: 6/15/2020       Dear Clare Pan:    Thank you for referring Kathi Head to me for evaluation. Attached you will find relevant portions of my assessment and plan of care.    If you have questions, please do not hesitate to call me. I look forward to following Kathi Head along with you.    Sincerely,    Teofilo Plaza, DPJEREMY    Enclosure  CC:  No Recipients    If you would like to receive this communication electronically, please contact [email or web address] or [phone] to request more information on [employer] Link access.    For providers and/or their staff who would like to refer a patient to [facility], please contact us through our one-stop-shop provider referral line, [facility], at [phone].    If you feel you have received this communication in error or would no longer like to receive these types of communications, please e-mail [email or web address]

## 2020-06-16 ENCOUNTER — TELEPHONE (OUTPATIENT)
Dept: PODIATRY | Facility: CLINIC | Age: 70
End: 2020-06-16

## 2020-06-16 ENCOUNTER — NURSE TRIAGE (OUTPATIENT)
Dept: ADMINISTRATIVE | Facility: CLINIC | Age: 70
End: 2020-06-16

## 2020-06-16 NOTE — TELEPHONE ENCOUNTER
Done. Instructed on ice and elevation. Instructed OTC medication for constipation and increased fluids. Instructed to call office if any further issues. Verbalized understanding of all instructions.

## 2020-06-16 NOTE — TELEPHONE ENCOUNTER
Received note from phone (triage nurse) with complaint of dizziness.  Called pt she stated the dizzy spells she has had for quite some time before surgery and are always intermittent,she has been tx by two different ENT one of with is Dr. Oliveira. Has had sinus surgery in the past c/o post nasal drip and dizziness has been continued issue.   Stated just very concerned about constipation.      Pt would like to know what she can take for constipation stated this is the first time she has had this problem. Informed pt this is sometimes due to pain medication, decreased mobility and instructed to increase fluids. Pt stated has had no change since yesterday with her foot. Stated toes pink in color, no temperature and able to place one finger under cast area (so cast is not tight).

## 2020-06-16 NOTE — TELEPHONE ENCOUNTER
Spoke with pt: c/o dizziness, right foot swollen, and  constipation. protocol advice given and  pt verbalizes understanding.   Exposed foot is swollen and normal coloring. Could not get pt to tell me if foot has abnormal temperature. instructed pt to elevate foot when not in use. instructed pt on constipation guidelines.  For dizziness protocol would recommend visit in office now. instructed pt to call her PCP and ask for appointment now for dizziness. Pt verbalizes understanding.  Pt states she is hydrating. Last BM today- smaller than normal.     Reason for Disposition   Mild constipation   Lightheadedness (dizziness) present now, after 2 hours of rest and fluids    Additional Information   Negative: Sounds like a life-threatening emergency to the triager   Negative: [1] Widespread rash AND [2] bright red, sunburn-like   Negative: [1] Severe headache AND [2] after spinal (epidural) anesthesia   Negative: [1] Vomiting AND [2] persists > 4 hours   Negative: [1] Vomiting AND [2] abdomen looks much more swollen than usual   Negative: [1] Drinking very little AND [2] dehydration suspected (e.g., no urine > 12 hours, very dry mouth, very lightheaded)   Negative: Patient sounds very sick or weak to the triager   Negative: Sounds like a serious complication to the triager   Negative: Fever > 100.5 F (38.1 C)   Negative: [1] SEVERE post-op pain (e.g., excruciating, pain scale 8-10) AND [2] not controlled with pain medications   Negative: [1] Caller has URGENT question AND [2] triager unable to answer question   Negative: [1] Headache AND [2] after spinal (epidural) anesthesia AND [3] not severe   Negative: Fever present > 3 days (72 hours)   Negative: Patient sounds very sick or weak to the triager   Negative: Constant abdominal pain lasting > 2 hours   Negative: Vomiting bile (green color)   Negative: Vomiting and abdomen looks much more swollen than usual   Negative: Abdomen is more swollen than  usual   Negative: Last bowel movement (BM) > 4 days ago   Negative: Rectal pain or fullness from fecal impaction (rectum full of stool) and NOT better after SITZ bath, suppository or enema   Negative: Leaking stool   Negative: Intermittent mild abdominal pain and fever   Negative: Unable to have a bowel movement (BM) without manually removing stool (using finger to pull out stool or perform disimpaction)   Negative: Unable to have a bowel movement (BM) without using a laxative, suppository, or enema   Negative: Constipation persists > 1 week and no improvement after using CARE ADVICE   Negative: Weight loss greater than 10 pounds (5 kg) and not dieting   Negative: Pencil-like, narrow stools   Negative: Patient wants to be seen   Negative: Uses laxative (e.g., PEG / Miralax. milk of magnesia) or enema more than once a month   Negative: Constipation is a recurrent ongoing problem (i.e., < 3 BMs / week or straining > 25% of the time)   Negative: Minor bleeding from rectum (e.g., blood just on toilet paper, few drops, streaks on surface of normal formed BM) occurs more than twice   Negative: Shock suspected (e.g., cold/pale/clammy skin, too weak to stand, low BP, rapid pulse)   Negative: Difficult to awaken or acting confused (e.g., disoriented, slurred speech)   Negative: Fainted, and still feels dizzy afterwards   Negative: Severe difficulty breathing (e.g., struggling for each breath, speaks in single words)   Negative: Overdose (accidental or intentional) of medications   Negative: New neurologic deficit that is present now: * Weakness of the face, arm, or leg on one side of the body * Numbness of the face, arm, or leg on one side of the body * Loss of speech or garbled speech   Negative: Heart beating < 50 beats per minute OR > 140 beats per minute   Negative: Sounds like a life-threatening emergency to the triager   Negative: SEVERE dizziness (e.g., unable to stand, requires support to walk,  feels like passing out now)   Negative: Severe headache   Negative: Extra heart beats OR irregular heart beating (i.e., 'palpitations')   Negative: Difficulty breathing   Negative: Drinking very little and has signs of dehydration (e.g., no urine > 12 hours, very dry mouth, very lightheaded)   Negative: Follows bleeding (e.g., stomach, rectum, vagina) (Exception: became dizzy from sight of small amount blood)   Negative: Patient sounds very sick or weak to the triager    Protocols used: CONSTIPATION-A-OH, DIZZINESS-A-OH, POST-OP SYMPTOMS AND [id number]

## 2020-06-16 NOTE — LETTER
This communication is flagged as high priority.     [facility]  [address]  [place] [address]  Phone: [phone]  Fax: [phone]       Kathi Head  1950    Pt called nurse line. Please call pt re dizziness. protcol would recommend to see pt now in office.   Pt# [phone]   Spoke with pt: c/o dizziness, right foot swollen, and  constipation. protocol advice given and  pt verbalizes understanding.   Exposed foot is swollen and normal coloring. Could not get pt to tell me if foot has abnormal temperature. instructed pt to elevate foot when not in use. instructed pt on constipation guidelines.  For dizziness protocol would recommend visit in office now. instructed pt to call her PCP and ask for appointment now for dizziness. Pt verbalizes understanding.  Pt states she is hydrating. Last BM today- smaller than normal.    Reason for Disposition   Mild constipation   Lightheadedness (dizziness) present now, after 2 hours of rest and fluids    Additional Information   Negative: Sounds like a life-threatening emergency to the triager   Negative: [1] Widespread rash AND [2] bright red, sunburn-like   Negative: [1] Severe headache AND [2] after spinal (epidural) anesthesia   Negative: [1] Vomiting AND [2] persists > 4 hours   Negative: [1] Vomiting AND [2] abdomen looks much more swollen than usual   Negative: [1] Drinking very little AND [2] dehydration suspected (e.g., no urine > 12 hours, very dry mouth, very lightheaded)   Negative: Patient sounds very sick or weak to the triager   Negative: Sounds like a serious complication to the triager   Negative: Fever > 100.5 F (38.1 C)   Negative: [1] SEVERE post-op pain (e.g., excruciating, pain scale 8-10) AND [2] not controlled with pain medications   Negative: [1] Caller has URGENT question AND [2] triager unable to answer question   Negative: [1] Headache AND [2] after spinal (epidural) anesthesia AND [3] not severe    Negative: Fever present > 3 days (72 hours)   Negative: Patient sounds very sick or weak to the triager   Negative: Constant abdominal pain lasting > 2 hours   Negative: Vomiting bile (green color)   Negative: Vomiting and abdomen looks much more swollen than usual   Negative: Abdomen is more swollen than usual   Negative: Last bowel movement (BM) > 4 days ago   Negative: Rectal pain or fullness from fecal impaction (rectum full of stool) and NOT better after SITZ bath, suppository or enema   Negative: Leaking stool   Negative: Intermittent mild abdominal pain and fever   Negative: Unable to have a bowel movement (BM) without manually removing stool (using finger to pull out stool or perform disimpaction)   Negative: Unable to have a bowel movement (BM) without using a laxative, suppository, or enema   Negative: Constipation persists > 1 week and no improvement after using CARE ADVICE   Negative: Weight loss greater than 10 pounds (5 kg) and not dieting   Negative: Pencil-like, narrow stools   Negative: Patient wants to be seen   Negative: Uses laxative (e.g., PEG / Miralax. milk of magnesia) or enema more than once a month   Negative: Constipation is a recurrent ongoing problem (i.e., < 3 BMs / week or straining > 25% of the time)   Negative: Minor bleeding from rectum (e.g., blood just on toilet paper, few drops, streaks on surface of normal formed BM) occurs more than twice   Negative: Shock suspected (e.g., cold/pale/clammy skin, too weak to stand, low BP, rapid pulse)   Negative: Difficult to awaken or acting confused (e.g., disoriented, slurred speech)   Negative: Fainted, and still feels dizzy afterwards   Negative: Severe difficulty breathing (e.g., struggling for each breath, speaks in single words)   Negative: Overdose (accidental or intentional) of medications   Negative: New neurologic deficit that is present now: * Weakness of the face, arm, or leg on one side of the body *  Numbness of the face, arm, or leg on one side of the body * Loss of speech or garbled speech   Negative: Heart beating < 50 beats per minute OR > 140 beats per minute   Negative: Sounds like a life-threatening emergency to the triager   Negative: SEVERE dizziness (e.g., unable to stand, requires support to walk, feels like passing out now)   Negative: Severe headache   Negative: Extra heart beats OR irregular heart beating (i.e., 'palpitations')   Negative: Difficulty breathing   Negative: Drinking very little and has signs of dehydration (e.g., no urine > 12 hours, very dry mouth, very lightheaded)   Negative: Follows bleeding (e.g., stomach, rectum, vagina) (Exception: became dizzy from sight of small amount blood)   Negative: Patient sounds very sick or weak to the triager    Protocols used: CONSTIPATION-A-OH, DIZZINESS-A-OH, POST-OP SYMPTOMS AND [id number]

## 2020-06-25 ENCOUNTER — TELEPHONE (OUTPATIENT)
Dept: PODIATRY | Facility: CLINIC | Age: 70
End: 2020-06-25

## 2020-06-25 ENCOUNTER — NURSE TRIAGE (OUTPATIENT)
Dept: ADMINISTRATIVE | Facility: CLINIC | Age: 70
End: 2020-06-25

## 2020-06-25 NOTE — TELEPHONE ENCOUNTER
Post procedure follow up call attempted, called answered then hung up. No triage completed at this time. Member of triage team will retry tomorrow per protocol.     Reason for Disposition   Caller is angry or rude (e.g., hangs up, verbally abusive, yelling)    Protocols used: NO CONTACT OR DUPLICATE CONTACT CALL-A-AH

## 2020-06-25 NOTE — TELEPHONE ENCOUNTER
----- Message from Marty Aquino sent at 6/25/2020  1:03 PM CDT -----  Contact: pt  Type: Needs Medical Advice  Who Called:  pt  Symptoms (please be specific):  sores on leg near cast / very itchy  How long has patient had these symptoms:  2 months  Pharmacy name and phone #:      [facility] [place] [place] [place] - [address]  [address]  [place] [address]  Phone: [phone] Fax: [phone]    Best Call Back Number: [phone]  Additional Information:

## 2020-06-25 NOTE — TELEPHONE ENCOUNTER
Spoke to patient and she states she has a rash on her back that started today. She does not think it is the antibiotic since she is almost done. She states she has taken benadryl and put calamine lotion on the area. She is concerned it will spread down to her legs and into her cast. Patient was advised to contact her PCP and advise them of her issue. Patient verbalized understanding

## 2020-06-29 ENCOUNTER — OFFICE VISIT (OUTPATIENT)
Dept: PODIATRY | Facility: CLINIC | Age: 70
End: 2020-06-29
Payer: MEDICARE

## 2020-06-29 VITALS
TEMPERATURE: 98 F | HEART RATE: 85 BPM | RESPIRATION RATE: 18 BRPM | HEIGHT: 67 IN | SYSTOLIC BLOOD PRESSURE: 130 MMHG | DIASTOLIC BLOOD PRESSURE: 82 MMHG | BODY MASS INDEX: 25.11 KG/M2 | WEIGHT: 160 LBS

## 2020-06-29 DIAGNOSIS — S86.011D ACHILLES RUPTURE, RIGHT, SUBSEQUENT ENCOUNTER: ICD-10-CM

## 2020-06-29 DIAGNOSIS — M77.31 CALCANEAL SPUR OF FOOT, RIGHT: Primary | ICD-10-CM

## 2020-06-29 PROCEDURE — 29405 PR APPLY SHORT LEG CAST: ICD-10-PCS | Mod: 58,RT,S$GLB, | Performed by: PODIATRIST

## 2020-06-29 PROCEDURE — 29405 APPL SHORT LEG CAST: CPT | Mod: 58,RT,S$GLB, | Performed by: PODIATRIST

## 2020-06-29 PROCEDURE — 99024 POSTOP FOLLOW-UP VISIT: CPT | Mod: S$GLB,,, | Performed by: PODIATRIST

## 2020-06-29 PROCEDURE — 99024 PR POST-OP FOLLOW-UP VISIT: ICD-10-PCS | Mod: S$GLB,,, | Performed by: PODIATRIST

## 2020-06-29 PROCEDURE — 99999 PR PBB SHADOW E&M-EST. PATIENT-LVL IV: CPT | Mod: PBBFAC,,, | Performed by: PODIATRIST

## 2020-06-29 PROCEDURE — 99999 PR PBB SHADOW E&M-EST. PATIENT-LVL IV: ICD-10-PCS | Mod: PBBFAC,,, | Performed by: PODIATRIST

## 2020-06-30 VITALS
BODY MASS INDEX: 25.11 KG/M2 | TEMPERATURE: 97 F | HEIGHT: 67 IN | HEART RATE: 70 BPM | WEIGHT: 160 LBS | SYSTOLIC BLOOD PRESSURE: 111 MMHG | RESPIRATION RATE: 20 BRPM | DIASTOLIC BLOOD PRESSURE: 64 MMHG | OXYGEN SATURATION: 98 %

## 2020-07-04 NOTE — PROGRESS NOTES
"Kathi Head is a 69 y.o. female patient.   1. Calcaneal spur of foot, right    2. Achilles rupture, right, subsequent encounter      Past Medical History:   Diagnosis Date    Allergy     GERD (gastroesophageal reflux disease)     Hyperlipidemia     Neck pain 12/9/2019    Shingles     Thyroid disease      No past surgical history pertinent negatives on file.  Scheduled Meds:  Continuous Infusions:  PRN Meds:    Review of patient's allergies indicates:   Allergen Reactions    Hydrocodone Rash     There are no hospital problems to display for this patient.    Blood pressure 130/82, pulse 85, temperature 98.4 °F (36.9 °C), temperature source Oral, resp. rate 18, height 5' 7" (1.702 m), weight 72.6 kg (160 lb).            Subjective  Objective:  Vital signs (most recent): Blood pressure 130/82, pulse 85, temperature 98.4 °F (36.9 °C), temperature source Oral, resp. rate 18, height 5' 7" (1.702 m), weight 72.6 kg (160 lb).     Assessment & Plan     Patient presents status post excision posterior calcaneal exostosis and Achilles tendon repair right.  Patient states she is doing well she has requested a knee scooter she states that she is going to need this to get around a little bit better and to maintain her nonweightbearing status.  Patient is doing well she did state that it appears that the hydrocodone was causing her to have a rash.  She did get a Medrol Dosepak from primary care which resolved the rash.  Patient is taking her daily aspirin the patient's cast was removed the incision looks very good there is minimal inflammation noted no drainage no signs of infection noted.  A new well-padded thick dressing was applied to the area patient was placed in a new lower leg fiberglass cast with her ankle placed at 90° plan follow-up will be 2 weeks for another cast change and evaluation.  Patient advised to contact us with any problems questions or concerns I have continued to encourage ice and elevation. "  This note was created using [employer] voice recognition software that occasionally misinterpreted phrases or words.  Teofiol Plaza DPM  7/4/2020

## 2020-07-13 ENCOUNTER — OFFICE VISIT (OUTPATIENT)
Dept: PODIATRY | Facility: CLINIC | Age: 70
End: 2020-07-13
Payer: MEDICARE

## 2020-07-13 VITALS
HEART RATE: 74 BPM | DIASTOLIC BLOOD PRESSURE: 70 MMHG | TEMPERATURE: 99 F | HEIGHT: 67 IN | OXYGEN SATURATION: 98 % | SYSTOLIC BLOOD PRESSURE: 104 MMHG | BODY MASS INDEX: 25.11 KG/M2 | RESPIRATION RATE: 18 BRPM | WEIGHT: 160 LBS

## 2020-07-13 DIAGNOSIS — M76.61 ACHILLES TENDINITIS OF RIGHT LOWER EXTREMITY: Primary | ICD-10-CM

## 2020-07-13 PROCEDURE — 99999 PR PBB SHADOW E&M-EST. PATIENT-LVL V: CPT | Mod: PBBFAC,,, | Performed by: PODIATRIST

## 2020-07-13 PROCEDURE — 99024 PR POST-OP FOLLOW-UP VISIT: ICD-10-PCS | Mod: S$GLB,,, | Performed by: PODIATRIST

## 2020-07-13 PROCEDURE — 29405 APPL SHORT LEG CAST: CPT | Mod: 58,RT,S$GLB, | Performed by: PODIATRIST

## 2020-07-13 PROCEDURE — 99999 PR PBB SHADOW E&M-EST. PATIENT-LVL V: ICD-10-PCS | Mod: PBBFAC,,, | Performed by: PODIATRIST

## 2020-07-13 PROCEDURE — 99024 POSTOP FOLLOW-UP VISIT: CPT | Mod: S$GLB,,, | Performed by: PODIATRIST

## 2020-07-13 PROCEDURE — 29405 PR APPLY SHORT LEG CAST: ICD-10-PCS | Mod: 58,RT,S$GLB, | Performed by: PODIATRIST

## 2020-07-13 NOTE — LETTER
July 18, 2020      Clare Pan NP  [address]  [place] [address]           [facility]  [address] [place] [address] [place] [address]  Phone: [phone]  Fax: [phone]          Patient: Kathi Head   MR Number: [id number]   YOB: 1950   Date of Visit: 7/13/2020       Dear Clare Pan:    Thank you for referring Kathi Head to me for evaluation. Attached you will find relevant portions of my assessment and plan of care.    If you have questions, please do not hesitate to call me. I look forward to following Kathi Head along with you.    Sincerely,    Teofilo Plaza, DPJEREMY    Enclosure  CC:  No Recipients    If you would like to receive this communication electronically, please contact [email or web address] or [phone] to request more information on [employer] Link access.    For providers and/or their staff who would like to refer a patient to [facility], please contact us through our one-stop-shop provider referral line, [facility], at [phone].    If you feel you have received this communication in error or would no longer like to receive these types of communications, please e-mail [email or web address]

## 2020-07-18 NOTE — PROGRESS NOTES
"Kathi Head is a 69 y.o. female patient.   1. Achilles tendinitis of right lower extremity      Past Medical History:   Diagnosis Date    Allergy     GERD (gastroesophageal reflux disease)     Hyperlipidemia     Neck pain 12/9/2019    Shingles     Thyroid disease      No past surgical history pertinent negatives on file.  Scheduled Meds:  Continuous Infusions:  PRN Meds:    Review of patient's allergies indicates:   Allergen Reactions    Hydrocodone Rash     There are no hospital problems to display for this patient.    Blood pressure 104/70, pulse 74, temperature 98.9 °F (37.2 °C), temperature source Oral, resp. rate 18, height 5' 7" (1.702 m), weight 72.6 kg (160 lb), SpO2 98 %.                    Subjective  Objective:  Vital signs (most recent): Blood pressure 104/70, pulse 74, temperature 98.9 °F (37.2 °C), temperature source Oral, resp. rate 18, height 5' 7" (1.702 m), weight 72.6 kg (160 lb), SpO2 98 %.     Assessment & Plan     Patient presents status post excision posterior calcaneal exostosis and Achilles tendon repair right.  Patient states she is doing well she has requested a knee scooter she states that she is going to need this to get around a little bit better and to maintain her nonweightbearing status.  Patient is doing well she did state that it appears that the hydrocodone was causing her to have a rash.  She did get a Medrol Dosepak from primary care which resolved the rash.  Patient is taking her daily aspirin the patient's cast was removed the incision looks very good there is minimal inflammation noted no drainage no signs of infection noted.  A new well-padded thick dressing was applied to the area patient was placed in a new lower leg fiberglass cast with her ankle placed at 90° plan follow-up will be 2 weeks at which time I plan to allow the patient to begin to bear weight in a fracture boot she will also be able to get the area wet at that time.  Patient advised to " contact us with any problems questions or concerns I have continued to encourage ice and elevation.  New lower leg fiberglass cast was applied patient has very good range of motion with no significant pain or discomfort noted on range of motion.  This note was created using [employer] voice recognition software that occasionally misinterpreted phrases or words.  Teofilo Plaza DPM  7/18/2020

## 2020-07-27 ENCOUNTER — OFFICE VISIT (OUTPATIENT)
Dept: PODIATRY | Facility: CLINIC | Age: 70
End: 2020-07-27
Payer: MEDICARE

## 2020-07-27 VITALS
WEIGHT: 160 LBS | TEMPERATURE: 98 F | DIASTOLIC BLOOD PRESSURE: 67 MMHG | HEART RATE: 88 BPM | BODY MASS INDEX: 25.11 KG/M2 | HEIGHT: 67 IN | SYSTOLIC BLOOD PRESSURE: 127 MMHG

## 2020-07-27 DIAGNOSIS — M76.61 ACHILLES TENDINITIS OF RIGHT LOWER EXTREMITY: Primary | ICD-10-CM

## 2020-07-27 DIAGNOSIS — M77.31 CALCANEAL SPUR OF FOOT, RIGHT: ICD-10-CM

## 2020-07-27 PROCEDURE — 99024 POSTOP FOLLOW-UP VISIT: CPT | Mod: S$GLB,,, | Performed by: PODIATRIST

## 2020-07-27 PROCEDURE — 99024 PR POST-OP FOLLOW-UP VISIT: ICD-10-PCS | Mod: S$GLB,,, | Performed by: PODIATRIST

## 2020-07-27 PROCEDURE — 99999 PR PBB SHADOW E&M-EST. PATIENT-LVL IV: CPT | Mod: PBBFAC,,, | Performed by: PODIATRIST

## 2020-07-27 PROCEDURE — 99999 PR PBB SHADOW E&M-EST. PATIENT-LVL IV: ICD-10-PCS | Mod: PBBFAC,,, | Performed by: PODIATRIST

## 2020-08-01 NOTE — PROGRESS NOTES
"Kathi Head is a 69 y.o. female patient.   1. Achilles tendinitis of right lower extremity    2. Calcaneal spur of foot, right      Past Medical History:   Diagnosis Date    Allergy     GERD (gastroesophageal reflux disease)     Hyperlipidemia     Neck pain 12/9/2019    Shingles     Thyroid disease      No past surgical history pertinent negatives on file.  Scheduled Meds:  Continuous Infusions:  PRN Meds:    Review of patient's allergies indicates:   Allergen Reactions    Hydrocodone Rash     There are no hospital problems to display for this patient.    Blood pressure 127/67, pulse 88, temperature 97.8 °F (36.6 °C), height 5' 7" (1.702 m), weight 72.6 kg (160 lb).                            Subjective  Objective:  Vital signs (most recent): Blood pressure 127/67, pulse 88, temperature 97.8 °F (36.6 °C), height 5' 7" (1.702 m), weight 72.6 kg (160 lb).     Assessment & Plan     Patient presents status post excision posterior calcaneal exostosis and Achilles tendon repair right.  Patient is doing well she states she did have a fall yesterday hitting the right foot.  Patient's cast was removed the incision looks very good it is well healing well coapted there is no drainage no active signs of infection noted.  Patient was placed in a fracture boot she can now start to bear weight as tolerated I have advised her to ease her way back into weight-bearing she can now get the area wet she needs to be very careful in the bathroom not fall or injure the right foot as it is continuing to heal she is going to put her power step arch support in the fracture boot to make sure she has got adequate support she is not to apply any cream or lotion to the incision no antibiotic ointment is now and is not to scrub the area.  Plan follow-up will be 2 weeks.  Patient advised to contact us with any problems questions or concerns prior to scheduled follow-up.  This note was created using M*[employer] voice recognition " software that occasionally misinterpreted phrases or words.  Teofilo Plaza DPM  8/1/2020

## 2020-08-10 ENCOUNTER — OFFICE VISIT (OUTPATIENT)
Dept: PODIATRY | Facility: CLINIC | Age: 70
End: 2020-08-10
Payer: MEDICARE

## 2020-08-10 VITALS
DIASTOLIC BLOOD PRESSURE: 66 MMHG | BODY MASS INDEX: 25.11 KG/M2 | HEART RATE: 89 BPM | HEIGHT: 67 IN | WEIGHT: 160 LBS | TEMPERATURE: 99 F | SYSTOLIC BLOOD PRESSURE: 112 MMHG

## 2020-08-10 DIAGNOSIS — S86.011D ACHILLES RUPTURE, RIGHT, SUBSEQUENT ENCOUNTER: Primary | ICD-10-CM

## 2020-08-10 PROCEDURE — 99024 PR POST-OP FOLLOW-UP VISIT: ICD-10-PCS | Mod: S$GLB,,, | Performed by: PODIATRIST

## 2020-08-10 PROCEDURE — 99999 PR PBB SHADOW E&M-EST. PATIENT-LVL IV: ICD-10-PCS | Mod: PBBFAC,,, | Performed by: PODIATRIST

## 2020-08-10 PROCEDURE — 99024 POSTOP FOLLOW-UP VISIT: CPT | Mod: S$GLB,,, | Performed by: PODIATRIST

## 2020-08-10 PROCEDURE — 99999 PR PBB SHADOW E&M-EST. PATIENT-LVL IV: CPT | Mod: PBBFAC,,, | Performed by: PODIATRIST

## 2020-08-11 NOTE — PROGRESS NOTES
"Kathi Head is a 69 y.o. female patient.   1. Achilles rupture, right, subsequent encounter          Past Medical History:   Diagnosis Date    Allergy     GERD (gastroesophageal reflux disease)     Hyperlipidemia     Neck pain 12/9/2019    Shingles     Thyroid disease      No past surgical history pertinent negatives on file.  Scheduled Meds:  Continuous Infusions:  PRN Meds:    Review of patient's allergies indicates:   Allergen Reactions    Hydrocodone Rash     There are no hospital problems to display for this patient.    Blood pressure 112/66, pulse 89, temperature 99 °F (37.2 °C), height 5' 7" (1.702 m), weight 72.6 kg (160 lb).                            Subjective  Objective:  Vital signs (most recent): Blood pressure 112/66, pulse 89, temperature 99 °F (37.2 °C), height 5' 7" (1.702 m), weight 72.6 kg (160 lb).     Assessment & Plan     Patient presents status post excision posterior calcaneal exostosis and Achilles tendon repair right.   Patient states she is doing well the incision area looks good there is some well-formed scab on the area I have advised the patient to leave this alone lead a come off on its own when she bathes not to pull added not to scrub it not to apply any cream or lotion to the area.  Am going to allow the patient to start to transition from the boot to a normal shoe she does have to keep a light dressing on the back of her right heel to prevent rubbing and irritation I have recommended ice and elevation patient understands she is going to have some swelling with increased range of motion in activity she does have very good range of motion with plantar flexion and dorsiflexion without limitation will see how she is doing in 2 weeks and make a determination as to whether not she will need physical therapy at bedtime. Patient advised to contact us with any problems questions or concerns prior to scheduled follow-up.  This note was created using M*Modal voice " recognition software that occasionally misinterpreted phrases or words.  Teofilo Plaza DPM  8/11/2020

## 2020-08-27 ENCOUNTER — OFFICE VISIT (OUTPATIENT)
Dept: PODIATRY | Facility: CLINIC | Age: 70
End: 2020-08-27
Payer: MEDICARE

## 2020-08-27 VITALS
HEIGHT: 67 IN | TEMPERATURE: 98 F | DIASTOLIC BLOOD PRESSURE: 66 MMHG | RESPIRATION RATE: 13 BRPM | OXYGEN SATURATION: 97 % | HEART RATE: 73 BPM | SYSTOLIC BLOOD PRESSURE: 108 MMHG | WEIGHT: 156 LBS | BODY MASS INDEX: 24.48 KG/M2

## 2020-08-27 DIAGNOSIS — M77.31 CALCANEAL SPUR OF FOOT, RIGHT: ICD-10-CM

## 2020-08-27 DIAGNOSIS — S86.011D ACHILLES RUPTURE, RIGHT, SUBSEQUENT ENCOUNTER: Primary | ICD-10-CM

## 2020-08-27 PROCEDURE — 99024 PR POST-OP FOLLOW-UP VISIT: ICD-10-PCS | Mod: S$GLB,,, | Performed by: PODIATRIST

## 2020-08-27 PROCEDURE — 99024 POSTOP FOLLOW-UP VISIT: CPT | Mod: S$GLB,,, | Performed by: PODIATRIST

## 2020-08-27 PROCEDURE — 99999 PR PBB SHADOW E&M-EST. PATIENT-LVL IV: CPT | Mod: PBBFAC,,, | Performed by: PODIATRIST

## 2020-08-27 PROCEDURE — 99999 PR PBB SHADOW E&M-EST. PATIENT-LVL IV: ICD-10-PCS | Mod: PBBFAC,,, | Performed by: PODIATRIST

## 2020-08-27 RX ORDER — CYCLOBENZAPRINE HCL 10 MG
TABLET ORAL
COMMUNITY
Start: 2020-08-14 | End: 2020-10-22

## 2020-08-27 NOTE — LETTER
August 30, 2020      Clare Pan NP  [address]  [place] [address]           [facility] - Podiatry/Wound Care  [address]  [place] [address]  Phone: [phone]  Fax: [phone]          Patient: Kathi Head   MR Number: [id number]   YOB: 1950   Date of Visit: 8/27/2020       Dear Clare Pan:    Thank you for referring Kathi Head to me for evaluation. Attached you will find relevant portions of my assessment and plan of care.    If you have questions, please do not hesitate to call me. I look forward to following Kathi Head along with you.    Sincerely,    Teofilo Plaza, JEREMÍAS    Enclosure  CC:  No Recipients    If you would like to receive this communication electronically, please contact [email or web address] or [phone] to request more information on [employer] Link access.    For providers and/or their staff who would like to refer a patient to [facility], please contact us through our one-stop-shop provider referral line, [facility], at [phone].    If you feel you have received this communication in error or would no longer like to receive these types of communications, please e-mail [email or web address]

## 2020-08-30 NOTE — PROGRESS NOTES
"Kathi Head is a 69 y.o. female patient.   1. Achilles rupture, right, subsequent encounter    2. Calcaneal spur of foot, right          Past Medical History:   Diagnosis Date    Allergy     GERD (gastroesophageal reflux disease)     Hyperlipidemia     Neck pain 12/9/2019    Shingles     Thyroid disease      No past surgical history pertinent negatives on file.  Scheduled Meds:  Continuous Infusions:  PRN Meds:    Review of patient's allergies indicates:   Allergen Reactions    Hydrocodone Rash     There are no hospital problems to display for this patient.    Blood pressure 108/66, pulse 73, temperature 98 °F (36.7 °C), resp. rate 13, height 5' 7" (1.702 m), weight 70.8 kg (156 lb), SpO2 97 %.                                    Subjective  Objective:  Vital signs (most recent): Blood pressure 108/66, pulse 73, temperature 98 °F (36.7 °C), resp. rate 13, height 5' 7" (1.702 m), weight 70.8 kg (156 lb), SpO2 97 %.     Assessment & Plan     Patient presents status post excision posterior calcaneal exostosis and Achilles tendon repair right.   Patient states she is doing very well she is not having any pain or discomfort she is wearing normal shoes and states she is not really having any pain just a bit little bit of stiffness at the back of the right heel.  Patient has very good range of motion with no limitation this includes dorsiflexion and plantar flexion she states she is going to be starting physical therapy for her back because of a fall that she took.  At this point I am not recommending physical therapy on the Achilles area I feel it is best to let the patient just increase activity as tolerated to help breakdown scar tissue and restore normal range of motion of concerned that therapy might overdo it causing some additional inflammation in the area.  Patient was in understanding and agreement with this she is going to start physical therapy for her back not her foot at this time she is doing " very well she is continuing to recover well plan follow-up will be 3 weeks patient advised to contact us if she has any problems questions or concerns prior to that regularly scheduled follow-up.  This note was created using [employer] voice recognition software that occasionally misinterpreted phrases or words.  Teofilo Plaza DPM  8/30/2020

## 2020-09-02 ENCOUNTER — CLINICAL SUPPORT (OUTPATIENT)
Dept: REHABILITATION | Facility: HOSPITAL | Age: 70
End: 2020-09-02
Payer: MEDICARE

## 2020-09-02 DIAGNOSIS — R26.2 DIFFICULTY WALKING: ICD-10-CM

## 2020-09-02 DIAGNOSIS — M50.30 DEGENERATION OF CERVICAL INTERVERTEBRAL DISC: ICD-10-CM

## 2020-09-02 DIAGNOSIS — M51.36 DEGENERATION OF LUMBAR INTERVERTEBRAL DISC: Primary | ICD-10-CM

## 2020-09-02 PROCEDURE — 97161 PT EVAL LOW COMPLEX 20 MIN: CPT | Mod: PN

## 2020-09-02 NOTE — PLAN OF CARE
[facility]  Physical Therapy Initial Evaluation    Name: Kathi Head  Clinic Number: [id number]    Therapy Diagnosis:   Encounter Diagnoses   Name Primary?    Degeneration of lumbar intervertebral disc Yes    Degeneration of cervical intervertebral disc     Difficulty walking      Physician: Clare Pan NP    Physician Orders: PT Eval and Treat   Medical Diagnosis: Lumbar Degenerative disc disease;   Evaluation Date: 9/2/2020  Authorization period Expiration: 12/31/2020   Plan of Care Certification Period: 11/30/2020     Visit #: 1/ Visits authorized: 12  Time In:8:30 am   Time Out: 9:15 am   Total Billable Time: 50 minutes    Precautions: Standard    Subjective   Date of onset: sometime in later July  - Kathi is here for lower back pain, not for rehab of Right achilles (per Dr. Plaza note)   Date of Surgery: 6/12/2020   Surgery Date: 6/12/2020         Pre-op Diagnosis:  Calcaneal spur of foot, right [M77.31]  Achilles tendinitis of right lower extremity [M76.61]  Achilles rupture, right, subsequent encounter [S86.011D]     Post-op Diagnosis:  Post-Op Diagnosis Codes:     * Calcaneal spur of foot, right [M77.31]     * Achilles tendinitis of right lower extremity [M76.61]     * Achilles rupture, right, subsequent encounter [S86.011D]     Procedure(s) (LRB):  REPAIR, TENDON, ACHILLES (Right)  Secondary repair Achilles tendon longitudinal rupture right procedure code 35646  Excision posterior calcaneal exostosis and Alvarado's deformity right procedure code 64289  Surgeon(s) and Role:     * Teofilo Plaza DPM - Primary          Past Medical History:   Diagnosis Date    Allergy     GERD (gastroesophageal reflux disease)     Hyperlipidemia     Neck pain 12/9/2019    Shingles     Thyroid disease      Kathi Head  has a past surgical history that includes Appendectomy; Hysterectomy; Thyroid surgery; Colonoscopy (N/A, 10/2/2018); Nasal septum surgery;  Esophagogastroduodenoscopy (02/09/2018); Colonoscopy (N/A, 2/21/2020); Esophagogastroduodenoscopy (N/A, 2/21/2020); Repair of Achilles tendon (Right, 6/12/2020); and Surgical removal of retrocalcaneal exostosis (Right, 6/12/2020).    Kathi has a current medication list which includes the following prescription(s): cetirizine, cyclobenzaprine, duloxetine, escitalopram oxalate, gabapentin, hydrocodone-acetaminophen, hydrocortisone, levothyroxine, loratadine, magnesium oxide, multivitamin/iron/folic acid, omega 3-dha-epa-fish oil, pantoprazole, prenatal no122-iron-folic acid, and simvastatin.    Review of patient's allergies indicates:   Allergen Reactions    Hydrocodone Rash        Imaging: Xrays 8/14/20;   Thoracic:   1. Age appropriate degenerative disc changes throughout  2. Slight Dextroscoliosis of the upper t-spine  3. No evidence for acute bony injury or intrinsic bone lesion  Lumbar:   1. Mild degenerative disc changes throughout  2. Bilateral facet joint djd at L3-L4 and L5-S1 with slight hypertrophic anterolisthesis of L3 relative to L4 and L4 relative to L5  3. No instability on the flexion or extension views  4. 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 Kathi stated that because she had to walk with a CAM boot on her right foot, this also caused her to have increased lower back pain; Kathi stated that walking improves her pain; sitting makes it worse. She is used to being active and has not been able to do much at all since her foot surgery. Kathi stated that Dr. Plaza wanted her to do some therapy on her foot while she is here - advised patient that Dr. Plaza specifically stated in his office note that he does not want P.T. for this patient at this time. He wants her to gain range of motion on her own. When asked what exercises she was doing for her foot and ankle - her response was: " he didn't give me any exercises to do".     Pts goals: To start walking again.       Objective     Observation: Kathi ambulated into clinic; backs of shoes bent in to avoid pressure on her heels; minimal movement noted in her Right ankle/foot - sliding foot along on floor as opposed to picking it up. She is not c/o any lower back pain at this time.     Posture:    -       Rounded shoulders  -      Kyphosis   Gait: Antalgic gait pattern secondary to Right ankle/foot loss of mobility; not because of her lower back     Lumbar Range of Motion:    Degrees Pain   Flexion 40   Pulling in back         Extension 10   negative        Left Side Bending Hand to knee joint negative        Right Side Bending Hand to knee joint negative        Left rotation   25 Slight increase in discomfort        Right Rotation   30 Negative            Lower Extremity Strength  Right LE  Left LE    Knee extension: 5/5 Knee extension: 5/5   Knee flexion: 5/5 Knee flexion: 5/5   Hip flexion: 5/5 Hip flexion: 5/5   Hip extension:  5/5 Hip extension: 5/5   Hip abduction: 5/5 Hip abduction: 5/5   Hip " adduction: 5/5 Hip adduction 5/5   Ankle dorsiflexion: 3-/5 Ankle dorsiflexion: 5/5   Ankle plantarflexion: 3-/5 Ankle plantarflexion: 5/5   Upper abdominals 3+/5     Lower abdominals 3+/5     Back extensors 3+/5       Special Tests:    Repeated Flexion Negative   Repeated Extension Negative   Prone Instability Negative   Straight Leg Raise Negative   Slump Negative   Quadrant Negative   Femoral nerve test Negative       DTR:   Right Left Comment   Patellar (L3-4) 2+ 2+    Achilles (S1) 2+ 2+        Joint Mobility:   Lumbar: CPA unrestricted,   RPA unrestricted  LPA unrestricted    Thoracic: restricted - into extension and left rotation - upper through middle thoracic segments.     Palpation: moderate tenderness to palpation at thoracic spinous processes - T4 through T10; paraspinal muscle more in thoracic region than lumbar region; tenderness noted over PSIS's     Sensation: intact to light touch     Flexibility:     90/90 SLR = R minimal restriction, L minimal restriction   Ely's test: R no restriction, L no restriction   Shanon's test: R no restriction, L no restriction   Thor test: R no restriction, L no restriction      Functional Limitations Reports - G Codes  Category: Mobility, Body position, Carrying, Self care, Other  Tool: Oswestry   Score: 15% limitation     PT Evaluation Completed: Yes  Discussed Plan of Care with patient: Yes    TREATMENT:    Home Exercises and Patient Education Provided  Education provided re:   - progress towards goals   - role of therapy in multi - disciplinary team, goals for therapy  Pt educated on condition, POC, and expectations in therapy.  No spiritual or educational barriers to learning provided    Home exercises:  Pt will be provided HEP during course of treatment with progressions as appropriate. Pt was advised to perform these exercises free of pain, and to stop performing them if pain occurs.   Kathi demonstrated good  understanding of the education provided.      Assessment     Kathi is a 69 y.o. female referred to outpatient physical therapy and presents to PT with lower back pain. Kathi is recently out of CAM walker from Right achilles tendon repair and removal of Haglunds deformity.  Kathi is not here for any ankle/foot rehab per Dr. Plaza's instructions. Kathi demonstrates generalized lumbar pain as a result of decreased activity, walking with CAM boot on as well as history of DDD.  She will benefit from an exercise program for strengthening her core and hip mm as well as flexibility for spine and hips.  Patient demonstrates limitations as described in the problem list. Pt will benefit from physcial therapy services in order to maximize pain free functional mobility.  The following goals were discussed with the patient and patient is in agreement with them as to be addressed in the treatment plan.     Anticipated barriers to physical therapy: none    Medical necessity is demonstrated by the following IMPAIRMENTS/PROBLEM LIST:    weakness, impaired functional mobility, gait instability, impaired balance, decreased lower extremity function, pain, decreased ROM and impaired joint extensibility        GOALS:    Long Term Goals: 6 weeks    Pain: Decrease pain to no more than 3/10 to allow for improved ability to perform daily activities   Strength: Improve strength in core muscles to at least 4/5 for improved lumbopelvic stability  ROM: Improve ROM to 80%  of normal limits   Functional scale: Improve score on Oswestry  to < 5% limitation   Walking: Increase walking distance and/or duration to 6 blocks without pain   Postures: Increase sitting and/or standing duration to 30 mins  without pain   Transfers: Perform all  transfers without increased pain or limitation  Exercise: demonstrate independence with home exercise program to maintain gains made in therapy.        Plan     Certification Period: 9/2/2020 to 11/30/2020 .    Outpatient physical therapy 2 times weekly to  include: Manual Therapy, Moist Heat/ Ice, Neuromuscular Re-ed, Patient Education and Therapeutic Exercise. Cont PT for 6 weeks.   Pt may be seen by PTA as part of the rehabilitation team.     I certify the need for these services furnished under this plan of treatment and while under my care.    Meena Ramirez, PT          Attestation:   I have seen the patient, reviewed the therapist's plan of care, and I agree with the plan of care.   I certify the need for these services furnished under this plan of treatment and while under my care.         _______________            ________                                               _____________________  Physician/Referring Practitioner                                                            Date of Signature

## 2020-09-03 ENCOUNTER — TELEPHONE (OUTPATIENT)
Dept: SURGERY | Facility: CLINIC | Age: 70
End: 2020-09-03

## 2020-09-03 NOTE — TELEPHONE ENCOUNTER
Returned call. Scheduled appointment.     ----- Message from Mar Talbot sent at 9/3/2020 10:08 AM CDT -----  Contact: patient  Type: Needs Medical Advice  Who Called: patient    Best Call Back Number: [phone] (home)     Additional Information: Contact patient regarding scheduling testing

## 2020-09-09 ENCOUNTER — CLINICAL SUPPORT (OUTPATIENT)
Dept: REHABILITATION | Facility: HOSPITAL | Age: 70
End: 2020-09-09
Payer: MEDICARE

## 2020-09-09 DIAGNOSIS — M54.50 LUMBAR PAIN: Primary | ICD-10-CM

## 2020-09-09 PROCEDURE — 97110 THERAPEUTIC EXERCISES: CPT | Mod: PN,CQ

## 2020-09-09 NOTE — PROGRESS NOTES
Physical Therapy Daily Note     Name: Kathi Baker [place]  Clinic Number: [id number]  Diagnosis:   Encounter Diagnosis   Name Primary?    Lumbar pain Yes     Physician: Clare Pan NP  Precautions: Standard  Visit #: 2 of 12  PTA Visit #: 1  Time In: 9:30 AM  Time Out: 10:30 AM    Subjective     Pt reports: No new c/o's.   Pain Scale: Kathi rates pain on a scale of 0-10 to be 3 currently.    Objective     Kathi received individual therapeutic exercises to develop strength, endurance and posture for 45 minutes including:    Recumbent Bike BC Level 5 x 20 mins  Pelvic Tilts x 20  Bridges x 20  DKC w/ SB x 3 mins  Lumbar Rolls w/ SB x 3 mins  Prone Lumbar Ext x 10  Prone Alt A/L x 10  Quad. Alt A/L x 10  Cat/Camel x 10    Kathi received the following manual therapy techniques:  were applied to the:  for  minutes including:       The patient received the following direct contact modalities after being cleared for contraindications:     The patient received the following supervised modalities after being cleared for contradictions:     Written Home Exercises Provided:   Pt demo good understanding of the education provided. Kathi demonstrated good return demonstration of activities.     Education provided re:  Kathi verbalized good understanding of education provided.   No spiritual or educational barriers to learning provided    Assessment     Patient tolerated treatment fairly well; vc's needed for exercise technique; progress as tolerated to improve strength and mobility and decrease pain.   This is a 69 y.o. female referred to outpatient physical therapy and presents with a medical diagnosis of lower back pain. Kathi is recently out of CAM walker from Right achilles tendon repair and removal of Haglunds deformity.  Kathi is not here for any ankle/foot rehab per Dr. Plaza's instructions. Kathi demonstrates generalized lumbar pain as a result of decreased activity,  walking with CAM boot on as well as history of DDD and demonstrates limitations as described in the problem list. Pt prognosis is Good. Pt will continue to benefit from skilled outpatient physical therapy to address the deficits listed in the problem list, provide pt/family education and to maximize pt's level of independence in the home and community environment.     LONG TERM GOALS:  Long Term Goals: 6 weeks     Pain: Decrease pain to no more than 3/10 to allow for improved ability to perform daily activities   Strength: Improve strength in core muscles to at least 4/5 for improved lumbopelvic stability  ROM: Improve ROM to 80%  of normal limits   Functional scale: Improve score on Oswestry  to < 5% limitation   Walking: Increase walking distance and/or duration to 6 blocks without pain   Postures: Increase sitting and/or standing duration to 30 mins  without pain   Transfers: Perform all  transfers without increased pain or limitation  Exercise: demonstrate independence with home exercise program to maintain gains made in therapy.       Plan     Continue with established Plan of Care towards PT goals.    Therapist: Jonathan Favre, PTA  9/9/2020

## 2020-09-11 ENCOUNTER — CLINICAL SUPPORT (OUTPATIENT)
Dept: REHABILITATION | Facility: HOSPITAL | Age: 70
End: 2020-09-11
Payer: MEDICARE

## 2020-09-11 ENCOUNTER — OFFICE VISIT (OUTPATIENT)
Dept: SURGERY | Facility: CLINIC | Age: 70
End: 2020-09-11
Payer: MEDICARE

## 2020-09-11 VITALS
RESPIRATION RATE: 13 BRPM | HEIGHT: 67 IN | SYSTOLIC BLOOD PRESSURE: 113 MMHG | TEMPERATURE: 97 F | DIASTOLIC BLOOD PRESSURE: 70 MMHG | BODY MASS INDEX: 24.8 KG/M2 | OXYGEN SATURATION: 97 % | HEART RATE: 81 BPM | WEIGHT: 158 LBS

## 2020-09-11 DIAGNOSIS — R19.8 BORBORYGMI: ICD-10-CM

## 2020-09-11 DIAGNOSIS — M54.50 LUMBAR PAIN: Primary | ICD-10-CM

## 2020-09-11 DIAGNOSIS — R10.84 GENERALIZED ABDOMINAL PAIN: Primary | ICD-10-CM

## 2020-09-11 PROCEDURE — 1101F PR PT FALLS ASSESS DOC 0-1 FALLS W/OUT INJ PAST YR: ICD-10-PCS | Mod: CPTII,S$GLB,, | Performed by: SURGERY

## 2020-09-11 PROCEDURE — 1159F MED LIST DOCD IN RCRD: CPT | Mod: S$GLB,,, | Performed by: SURGERY

## 2020-09-11 PROCEDURE — 1101F PT FALLS ASSESS-DOCD LE1/YR: CPT | Mod: CPTII,S$GLB,, | Performed by: SURGERY

## 2020-09-11 PROCEDURE — 3078F DIAST BP <80 MM HG: CPT | Mod: CPTII,S$GLB,, | Performed by: SURGERY

## 2020-09-11 PROCEDURE — 1125F PR PAIN SEVERITY QUANTIFIED, PAIN PRESENT: ICD-10-PCS | Mod: S$GLB,,, | Performed by: SURGERY

## 2020-09-11 PROCEDURE — 99215 OFFICE O/P EST HI 40 MIN: CPT | Mod: S$GLB,,, | Performed by: SURGERY

## 2020-09-11 PROCEDURE — 1159F PR MEDICATION LIST DOCUMENTED IN MEDICAL RECORD: ICD-10-PCS | Mod: S$GLB,,, | Performed by: SURGERY

## 2020-09-11 PROCEDURE — 1125F AMNT PAIN NOTED PAIN PRSNT: CPT | Mod: S$GLB,,, | Performed by: SURGERY

## 2020-09-11 PROCEDURE — 3078F PR MOST RECENT DIASTOLIC BLOOD PRESSURE < 80 MM HG: ICD-10-PCS | Mod: CPTII,S$GLB,, | Performed by: SURGERY

## 2020-09-11 PROCEDURE — 3008F BODY MASS INDEX DOCD: CPT | Mod: CPTII,S$GLB,, | Performed by: SURGERY

## 2020-09-11 PROCEDURE — 99215 PR OFFICE/OUTPT VISIT, EST, LEVL V, 40-54 MIN: ICD-10-PCS | Mod: S$GLB,,, | Performed by: SURGERY

## 2020-09-11 PROCEDURE — 3074F SYST BP LT 130 MM HG: CPT | Mod: CPTII,S$GLB,, | Performed by: SURGERY

## 2020-09-11 PROCEDURE — 3008F PR BODY MASS INDEX (BMI) DOCUMENTED: ICD-10-PCS | Mod: CPTII,S$GLB,, | Performed by: SURGERY

## 2020-09-11 PROCEDURE — 3074F PR MOST RECENT SYSTOLIC BLOOD PRESSURE < 130 MM HG: ICD-10-PCS | Mod: CPTII,S$GLB,, | Performed by: SURGERY

## 2020-09-11 PROCEDURE — 97110 THERAPEUTIC EXERCISES: CPT | Mod: PN,CQ

## 2020-09-11 NOTE — PROGRESS NOTES
Subjective:       Patient ID: Kathi Head     Chief Complaint:  Follow-up (Abd. pain )      HPI:  Ms. Head returns today with no complaints of persistent generalized abdominal pain.  She was last seen on 3/5/2020 after en EGD & Colonoscopy on 2/21/2020.  She has generalized, mild, bloating, abdominal pain.  She is also experiencing borborygmi.  No nausea or vomiting.  No fevers, chills, diarrhea, constipation, melena, hematochezia, hematemesis, diarrhea, constipation, weight loss, night sweats, anorexia, etc.  She continues to believe that all of her symptoms are related to chronic sinus problems and postnasal drip.  She is followed by Dr. Oliveira who has treated her extensively with antibiotics, steroids, and surgery.  She summarizes her problems as my mucous drains down my throat and into my stomach causing me to hurt from my mid chest to my ankles.  EGD, colonoscopy, and pathology reports reviewed from 2/21/2020.  EGD revealed evidence of gastritis with adherent blood.  Colonoscopy revealed hemorrhoids and an anal fissure.  Duodenal biopsies were unremarkable.  Gastric biopsy showed evidence of chronic gastritis, Helicobacter negative.  Esophageal biopsies were unremarkable.  She is currently taking Protonix and is compliant with therapy.  She has no anorectal pain or discomfort.  When last seen she was scheduled for a HIDA scan with [facility] for further evaluation.  She did not keep the appointment for the HIDA scan, she cannot remember why she decided not to have the test.  She is now amendable to resuming evaluation.      Allergies & Meds:  Review of patient's allergies indicates:   Allergen Reactions    Hydrocodone Rash       Current Outpatient Medications   Medication Sig Dispense Refill    escitalopram oxalate (LEXAPRO) 5 MG Tab Take 1 tablet (5 mg total) by mouth nightly. 30 tablet 1    gabapentin (NEURONTIN) 300 MG capsule       hydrocortisone (PROCTO-SILKE) 1 % crpe Place 1 applicator rectally  4 (four) times daily. 28.4 g 11    magnesium oxide (MAG-OX) 400 mg (241.3 mg magnesium) tablet       multivitamin/iron/folic acid (CENTRUM ORAL) Take by mouth.      omega 3-dha-epa-fish oil 1,000 mg (120 mg-180 mg) Cap       simvastatin (ZOCOR) 20 MG tablet       cetirizine (ZYRTEC) 10 MG tablet Take 10 mg by mouth once daily.      cyclobenzaprine (FLEXERIL) 10 MG tablet TAKE 1 TABLET BY MOUTH 3 TIMES A DAY AS NEEDED FOR SPASM      DULoxetine (CYMBALTA) 30 MG capsule       HYDROcodone-acetaminophen (NORCO)  mg per tablet       levothyroxine (SYNTHROID) 100 MCG tablet Take 1 tablet (100 mcg total) by mouth once daily. (Patient not taking: Reported on 9/11/2020) 90 tablet 0    loratadine (CLARITIN) 10 mg tablet Take 10 mg by mouth once daily.      pantoprazole (PROTONIX) 40 MG tablet Take 1 tablet (40 mg total) by mouth once daily. Take in the morning before breakfast.  Wait 30 minutes before eating or drinking anything (Patient not taking: Reported on 9/11/2020) 30 tablet 11    prenatal no122-iron-folic acid  mg-mcg Tab        No current facility-administered medications for this visit.        PMFSHx:  Past Medical History:   Diagnosis Date    Allergy     GERD (gastroesophageal reflux disease)     Hyperlipidemia     Neck pain 12/9/2019    Shingles     Thyroid disease        Past Surgical History:   Procedure Laterality Date    APPENDECTOMY      COLONOSCOPY N/A 10/2/2018    Procedure: COLONOSCOPY;  Surgeon: Maci Sandoval MD;  Location: [facility];  Service: Endoscopy;  Laterality: N/A;    COLONOSCOPY N/A 2/21/2020    Procedure: COLONOSCOPY;  Surgeon: Tra Pedraza MD;  Location: [facility] ENDO;  Service: General;  Laterality: N/A;    ESOPHAGOGASTRODUODENOSCOPY  02/09/2018    Dr Tra [facility]    ESOPHAGOGASTRODUODENOSCOPY N/A 2/21/2020    Procedure: ESOPHAGOGASTRODUODENOSCOPY (EGD);  Surgeon: Tra Pedraza MD;  Location: [facility];  Service: General;   Laterality: N/A;    HYSTERECTOMY      NASAL SEPTUM SURGERY      REPAIR OF ACHILLES TENDON Right 6/12/2020    Procedure: REPAIR, TENDON, ACHILLES;  Surgeon: Teofilo Plaza DPM;  Location: [facility];  Service: Podiatry;  Laterality: Right;    SURGICAL REMOVAL OF RETROCALCANEAL EXOSTOSIS Right 6/12/2020    Procedure: EXCISION, EXOSTOSIS, RETROCALCANEAL;  Surgeon: Teofilo Plaza DPM;  Location: [facility] OR;  Service: Podiatry;  Laterality: Right;    THYROID SURGERY         Family History   Problem Relation Age of Onset    Breast cancer Neg Hx     Eczema Neg Hx     Lupus Neg Hx     Psoriasis Neg Hx     Melanoma Neg Hx        Social History     Tobacco Use    Smoking status: Never Smoker    Smokeless tobacco: Never Used   Substance Use Topics    Alcohol use: No    Drug use: No       Review of Systems   Constitutional: Negative for appetite change, chills, fatigue, fever and unexpected weight change.   HENT: Negative for congestion, dental problem, ear pain, mouth sores, postnasal drip, rhinorrhea, sore throat, tinnitus, trouble swallowing and voice change.    Eyes: Negative for photophobia, pain, discharge and visual disturbance.   Respiratory: Negative for cough, chest tightness, shortness of breath and wheezing.    Cardiovascular: Negative for chest pain, palpitations and leg swelling.   Gastrointestinal: Negative for abdominal pain, blood in stool, constipation, diarrhea, nausea and vomiting.   Endocrine: Negative for cold intolerance, heat intolerance, polydipsia, polyphagia and polyuria.   Genitourinary: Negative for difficulty urinating, dysuria, flank pain, frequency, hematuria and urgency.   Musculoskeletal: Negative for arthralgias, joint swelling and myalgias.   Skin: Negative for color change and rash.   Allergic/Immunologic: Negative for immunocompromised state.   Neurological: Negative for dizziness, tremors, seizures, syncope, speech difficulty, weakness, numbness and headaches.    Hematological: Negative for adenopathy. Does not bruise/bleed easily.   Psychiatric/Behavioral: Negative for agitation, confusion, hallucinations, self-injury and suicidal ideas. The patient is not nervous/anxious.             Physical Exam  Constitutional:       General: She is not in acute distress.     Appearance: Normal appearance. She is well-developed. She is not ill-appearing or toxic-appearing.      Comments: Body mass index is 24.75 kg/m².   HENT:      Head: Normocephalic and atraumatic.      Right Ear: Hearing and external ear normal.      Left Ear: Hearing and external ear normal.      Nose: Nose normal.   Eyes:      General: Lids are normal. No scleral icterus.     Conjunctiva/sclera: Conjunctivae normal.   Neck:      Musculoskeletal: Normal range of motion and neck supple.      Thyroid: No thyroid mass or thyromegaly.      Vascular: No carotid bruit or JVD.      Trachea: Trachea and phonation normal.   Cardiovascular:      Rate and Rhythm: Normal rate and regular rhythm.      Chest Wall: PMI is not displaced.      Heart sounds: Normal heart sounds. No murmur. No gallop.    Pulmonary:      Effort: Pulmonary effort is normal. No tachypnea, accessory muscle usage or respiratory distress.      Breath sounds: Normal breath sounds.   Abdominal:      General: Bowel sounds are normal.      Palpations: Abdomen is soft.      Tenderness: There is no abdominal tenderness.      Hernia: No hernia is present.   Lymphadenopathy:      Cervical: No cervical adenopathy.      Upper Body:      Right upper body: No supraclavicular adenopathy.      Left upper body: No supraclavicular adenopathy.   Skin:     General: Skin is warm and dry.      Findings: No rash.      Nails: There is no clubbing.     Neurological:      Mental Status: She is alert and oriented to person, place, and time. She is not disoriented.      GCS: GCS eye subscore is 4. GCS verbal subscore is 5. GCS motor subscore is 6.   Psychiatric:         Attention  and Perception: She is attentive.         Speech: Speech normal.         Behavior: Behavior normal.         Thought Content: Thought content normal.         Judgment: Judgment normal.             Medical Records Review:  EGD, colonoscopy, and pathology reports were reviewed as outlined above with the above summarized findings confirmed.       Abdominal ultrasound was reviewed from 1/20/2020 with no evidence of cholelithiasis, cholecystitis, or choledocholithiasis.  CT of the abdomen pelvis was reviewed from 9/18/2018 with mild right hydronephrosis noted but no other abnormalities.    Assessment:       Generalized abdominal pain.  Multiple unrelated medical problems, complaints, and comorbidities.  Polypharmacy.    1. Generalized abdominal pain    2. Borborygmi          Plan:     Generalized abdominal pain  -     NM Hepatobiliary (HIDA) W Pharm and Ejec; Future; Expected date: 09/11/2020    Borborygmi        Follow up in about 1 month (around 10/11/2020) for results.    Counseling/Medical Decision Making:  Ms. Head was counseled regarding the evaluation and management of abdominal pain.  Boundaries were verbally established regarding management of her sinus and nasal drainage complaints; specifically she was informed that I would not share an opinion or offer any evaluation or treatment of these complaints/conditions.    Total face-to-face encounter time was 60 minutes, 40 minutes spent counseling as outlined/summarized above.

## 2020-09-11 NOTE — PROGRESS NOTES
Physical Therapy Daily Note     Name: Kathi Baker [place]  Clinic Number: [id number]  Diagnosis:   Encounter Diagnosis   Name Primary?    Lumbar pain Yes     Physician: Clare Pan NP  Precautions: Standard  Visit #: 3 of 12  PTA Visit #: 2  Time In: 8:30 AM  Time Out: 9:30 AM    Subjective     Pt reports: No new c/o's.   Pain Scale: Kathi rates pain on a scale of 0-10 to be 0 currently.    Objective     Kathi received individual therapeutic exercises to develop strength, endurance and posture for 45 minutes including:    Recumbent Bike BC Level 5 x 20 mins  Pelvic Tilts x 20  Bridges x 20  Ball Squeezes x 3 mins  DKC w/ SB x 3 mins  Lumbar Rolls w/ SB x 3 mins  Prone press ups on elbows x 10  Prone Alt A/L x 10  Quad. Alt A/L x 10  Cat/Camel x 10  3 Way Scapular Retraction x 15 w/ Black TB    Kathi received the following manual therapy techniques:  were applied to the:  for  minutes including:       The patient received the following direct contact modalities after being cleared for contraindications:     The patient received the following supervised modalities after being cleared for contradictions:     Written Home Exercises Provided:   Pt demo good understanding of the education provided. Kathi demonstrated good return demonstration of activities.     Education provided re:  Kathi verbalized good understanding of education provided.   No spiritual or educational barriers to learning provided    Assessment     Patient tolerated treatment fairly well; vc's needed for exercise technique; progress as tolerated to improve strength and mobility and decrease pain.   This is a 69 y.o. female referred to outpatient physical therapy and presents with a medical diagnosis of lower back pain. Kathi is recently out of CAM walker from Right achilles tendon repair and removal of Haglunds deformity.  Kathi is not here for any ankle/foot rehab per Dr. Plaza's instructions.  Kathi demonstrates generalized lumbar pain as a result of decreased activity, walking with CAM boot on as well as history of DDD and demonstrates limitations as described in the problem list. Pt prognosis is Good. Pt will continue to benefit from skilled outpatient physical therapy to address the deficits listed in the problem list, provide pt/family education and to maximize pt's level of independence in the home and community environment.     LONG TERM GOALS:  Long Term Goals: 6 weeks     Pain: Decrease pain to no more than 3/10 to allow for improved ability to perform daily activities   Strength: Improve strength in core muscles to at least 4/5 for improved lumbopelvic stability  ROM: Improve ROM to 80%  of normal limits   Functional scale: Improve score on Oswestry  to < 5% limitation   Walking: Increase walking distance and/or duration to 6 blocks without pain   Postures: Increase sitting and/or standing duration to 30 mins  without pain   Transfers: Perform all  transfers without increased pain or limitation  Exercise: demonstrate independence with home exercise program to maintain gains made in therapy.       Plan     Continue with established Plan of Care towards PT goals.    Therapist: Jonathan Favre, PTA  9/11/2020

## 2020-09-14 ENCOUNTER — CLINICAL SUPPORT (OUTPATIENT)
Dept: REHABILITATION | Facility: HOSPITAL | Age: 70
End: 2020-09-14
Payer: MEDICARE

## 2020-09-14 DIAGNOSIS — M54.50 LUMBAR PAIN: Primary | ICD-10-CM

## 2020-09-14 PROCEDURE — 97110 THERAPEUTIC EXERCISES: CPT | Mod: PN,CQ

## 2020-09-14 NOTE — PROGRESS NOTES
Physical Therapy Daily Note     Name: Kathi Baker [place]  Clinic Number: [id number]  Diagnosis:   Encounter Diagnosis   Name Primary?    Lumbar pain Yes     Physician: Clare Pan NP  Precautions: Standard  Visit #: 4 of 12  PTA Visit #: 3  Time In: 10:20 AM  Time Out: 11:20 AM    Subjective     Pt reports: No new c/o's.   Pain Scale: Kathi rates pain on a scale of 0-10 to be 0 currently.    Objective     Kathi received individual therapeutic exercises to develop strength, endurance and posture for 45 minutes including:    Recumbent Bike BC Level 5 x 20 mins  Pelvic Tilts x 20  Bridges x 20  Ball Squeezes x 3 mins  DKC w/ SB x 3 mins  Lumbar Rolls w/ SB x 3 mins  Prone press ups on elbows x 10  Prone Alt A/L x 10  Quad. Alt A/L x 10  Cat/Camel x 10  3 Way Scapular Retraction x 15 w/ Black TB    Kathi received the following manual therapy techniques:  were applied to the:  for  minutes including:       The patient received the following direct contact modalities after being cleared for contraindications:     The patient received the following supervised modalities after being cleared for contradictions:     Written Home Exercises Provided:   Pt demo good understanding of the education provided. Kathi demonstrated good return demonstration of activities.     Education provided re:  Kathi verbalized good understanding of education provided.   No spiritual or educational barriers to learning provided    Assessment     Patient tolerated treatment fairly well; vc's needed for exercise technique; progress as tolerated to improve strength and mobility and decrease pain.   This is a 69 y.o. female referred to outpatient physical therapy and presents with a medical diagnosis of lower back pain. Kathi is recently out of CAM walker from Right achilles tendon repair and removal of Haglunds deformity.  Kathi is not here for any ankle/foot rehab per Dr. Plaza's instructions.  Kathi demonstrates generalized lumbar pain as a result of decreased activity, walking with CAM boot on as well as history of DDD and demonstrates limitations as described in the problem list. Pt prognosis is Good. Pt will continue to benefit from skilled outpatient physical therapy to address the deficits listed in the problem list, provide pt/family education and to maximize pt's level of independence in the home and community environment.     LONG TERM GOALS:  Long Term Goals: 6 weeks     Pain: Decrease pain to no more than 3/10 to allow for improved ability to perform daily activities   Strength: Improve strength in core muscles to at least 4/5 for improved lumbopelvic stability  ROM: Improve ROM to 80%  of normal limits   Functional scale: Improve score on Oswestry  to < 5% limitation   Walking: Increase walking distance and/or duration to 6 blocks without pain   Postures: Increase sitting and/or standing duration to 30 mins  without pain   Transfers: Perform all  transfers without increased pain or limitation  Exercise: demonstrate independence with home exercise program to maintain gains made in therapy.       Plan     Continue with established Plan of Care towards PT goals.    Therapist: Jonathan Favre, PTA  9/14/2020

## 2020-09-17 ENCOUNTER — OFFICE VISIT (OUTPATIENT)
Dept: PODIATRY | Facility: CLINIC | Age: 70
End: 2020-09-17
Payer: MEDICARE

## 2020-09-17 VITALS
WEIGHT: 158 LBS | SYSTOLIC BLOOD PRESSURE: 108 MMHG | HEART RATE: 82 BPM | DIASTOLIC BLOOD PRESSURE: 71 MMHG | HEIGHT: 67 IN | BODY MASS INDEX: 24.8 KG/M2 | TEMPERATURE: 98 F

## 2020-09-17 DIAGNOSIS — M77.31 CALCANEAL SPUR OF FOOT, RIGHT: ICD-10-CM

## 2020-09-17 DIAGNOSIS — M76.61 ACHILLES TENDINITIS OF RIGHT LOWER EXTREMITY: Primary | ICD-10-CM

## 2020-09-17 DIAGNOSIS — R26.81 UNSTEADY GAIT: ICD-10-CM

## 2020-09-17 PROCEDURE — 99024 POSTOP FOLLOW-UP VISIT: CPT | Mod: S$GLB,,, | Performed by: PODIATRIST

## 2020-09-17 PROCEDURE — 99024 PR POST-OP FOLLOW-UP VISIT: ICD-10-PCS | Mod: S$GLB,,, | Performed by: PODIATRIST

## 2020-09-17 PROCEDURE — 99999 PR PBB SHADOW E&M-EST. PATIENT-LVL IV: CPT | Mod: PBBFAC,,, | Performed by: PODIATRIST

## 2020-09-17 PROCEDURE — 99999 PR PBB SHADOW E&M-EST. PATIENT-LVL IV: ICD-10-PCS | Mod: PBBFAC,,, | Performed by: PODIATRIST

## 2020-09-18 ENCOUNTER — CLINICAL SUPPORT (OUTPATIENT)
Dept: REHABILITATION | Facility: HOSPITAL | Age: 70
End: 2020-09-18
Payer: MEDICARE

## 2020-09-18 DIAGNOSIS — M54.50 LUMBAR PAIN: Primary | ICD-10-CM

## 2020-09-18 PROCEDURE — 97110 THERAPEUTIC EXERCISES: CPT | Mod: PN

## 2020-09-18 NOTE — PROGRESS NOTES
Physical Therapy Daily Note     Name: Ktahi Baker [place]  Clinic Number: [id number]  Diagnosis:   Encounter Diagnosis   Name Primary?    Lumbar pain Yes     Physician: Clare Pan NP  Precautions: Standard  Visit #: 5 of 12  PTA Visit #: 3  Time In: 8:20 am   Time Out: 9:15 am     Subjective     Pt reports: No new c/o's.; got a new pair of shoes - hopefully to help her heel pain so that she can get back to walking.   Pain Scale: Kathi rates pain on a scale of 0-10 to be 0 currently.    Objective     Kathi received individual therapeutic exercises to develop strength, endurance and posture for 45 minutes including:    Recumbent Bike BC Level 5 x 20 mins  Pelvic Tilts x 20  Bridges x 20  Ball Squeezes x 3 mins  DKC w/ SB x 3 mins  Lumbar Rolls w/ SB x 3 mins  Prone press ups on elbows x 10  Prone Alt A/L x 10  Quad. Alt A/L x 10  Cat/Camel x 10  3 Way Scapular Retraction x 15 w/ Black TB      Written Home Exercises Provided:   Pt demo good understanding of the education provided. Kathi demonstrated good return demonstration of activities.     Education provided re:  Kathi verbalized good understanding of education provided.   No spiritual or educational barriers to learning provided    Assessment     Patient tolerated treatment fairly well; vc's needed for exercise technique; progress as tolerated to improve strength and mobility and decrease pain.   This is a 69 y.o. female referred to outpatient physical therapy and presents with a medical diagnosis of lower back pain. Kathi is recently out of CAM walker from Right achilles tendon repair and removal of Haglunds deformity.  Kathi is not here for any ankle/foot rehab per Dr. Plaza's instructions. Kathi demonstrates generalized lumbar pain as a result of decreased activity, walking with CAM boot on as well as history of DDD and demonstrates limitations as described in the problem list. Pt prognosis is Good. Pt will  continue to benefit from skilled outpatient physical therapy to address the deficits listed in the problem list, provide pt/family education and to maximize pt's level of independence in the home and community environment.     LONG TERM GOALS:  Long Term Goals: 6 weeks     Pain: Decrease pain to no more than 3/10 to allow for improved ability to perform daily activities   Strength: Improve strength in core muscles to at least 4/5 for improved lumbopelvic stability  ROM: Improve ROM to 80%  of normal limits   Functional scale: Improve score on Oswestry  to < 5% limitation   Walking: Increase walking distance and/or duration to 6 blocks without pain   Postures: Increase sitting and/or standing duration to 30 mins  without pain   Transfers: Perform all  transfers without increased pain or limitation  Exercise: demonstrate independence with home exercise program to maintain gains made in therapy.       Plan     Continue with established Plan of Care towards PT goals.    Therapist: Meena Ramirez, PT  9/18/2020

## 2020-09-19 NOTE — PROGRESS NOTES
"Kathi Head is a 69 y.o. female patient.   1. Achilles tendinitis of right lower extremity    2. Calcaneal spur of foot, right    3. Unsteady gait          Past Medical History:   Diagnosis Date    Allergy     GERD (gastroesophageal reflux disease)     Hyperlipidemia     Neck pain 12/9/2019    Shingles     Thyroid disease      No past surgical history pertinent negatives on file.  Scheduled Meds:  Continuous Infusions:  PRN Meds:    Review of patient's allergies indicates:   Allergen Reactions    Hydrocodone Rash     There are no hospital problems to display for this patient.    Blood pressure 108/71, pulse 82, temperature 97.9 °F (36.6 °C), height 5' 7" (1.702 m), weight 71.7 kg (158 lb).                                    Subjective  Objective:  Vital signs (most recent): Blood pressure 108/71, pulse 82, temperature 97.9 °F (36.6 °C), height 5' 7" (1.702 m), weight 71.7 kg (158 lb).         Assessment & Plan     Patient presents status post excision posterior calcaneal exostosis and Achilles tendon repair right.   Patient states she is doing very well she is not having any pain or discomfort she is wearing normal shoes and states she is not really having any pain just a bit little bit of stiffness at the back of the right heel.  Patient has very good range of motion with no limitation this includes dorsiflexion and plantar flexion she states she is going to be starting physical therapy for her back because of a fall that she took.  Patient states she has been going to physical therapy for her back she indicates she is going 2 times a month she states at 1 point they did do massage the back of her right heel she states it was a little sore but it actually felt better I asked the patient whether not she wanted orders to start physical therapy for the area where she had it occur her Achilles tendon repaired right side she stated she did not wish to pursue physical therapy for this at this time.  " Patient has great range of motion limited inflammation I am going to release the patient following surgical intervention to repair her Achilles tendon she has been advised to contact us with any problems questions or concerns.  Patient states she has been walking a lot and been very active in this has not limited her.  This note was created using [employer] voice recognition software that occasionally misinterpreted phrases or words.  Teofilo Plaza DPM  9/19/2020

## 2020-09-21 ENCOUNTER — CLINICAL SUPPORT (OUTPATIENT)
Dept: REHABILITATION | Facility: HOSPITAL | Age: 70
End: 2020-09-21
Payer: MEDICARE

## 2020-09-21 DIAGNOSIS — M54.50 LUMBAR PAIN: Primary | ICD-10-CM

## 2020-09-21 PROCEDURE — 97110 THERAPEUTIC EXERCISES: CPT | Mod: PN,CQ

## 2020-09-21 NOTE — PROGRESS NOTES
Physical Therapy Daily Note     Name: Kathi Baker [place]  Clinic Number: [id number]  Diagnosis:   Encounter Diagnosis   Name Primary?    Lumbar pain Yes     Physician: Clare Pan NP  Precautions: Standard  Visit #: 6 of 12  PTA Visit #: 1  Time In: 9:30 AM   Time Out: 10:20 AM    Subjective     Pt reports: No new c/o's.   Pain Scale: Kathi rates pain on a scale of 0-10 to be 0 currently.    Objective     Kathi received individual therapeutic exercises to develop strength, endurance and posture for 45 minutes including:    Recumbent Bike BC Level 5 x 20 mins  Pelvic Tilts x 20  Bridges x 20  Ball Squeezes x 3 mins  DKC w/ SB x 3 mins  Lumbar Rolls w/ SB x 3 mins  Prone press ups on elbows x 10  Prone Alt A/L x 10  Quad. Alt A/L x 10  Cat/Camel x 10  3 Way Scapular Retraction x 20 w/ Black TB      Written Home Exercises Provided:   Pt demo good understanding of the education provided. Kathi demonstrated good return demonstration of activities.     Education provided re:  Kathi verbalized good understanding of education provided.   No spiritual or educational barriers to learning provided    Assessment     Patient tolerated treatment fairly well; vc's needed for exercise technique; progress as tolerated to improve strength and mobility and decrease pain.   This is a 69 y.o. female referred to outpatient physical therapy and presents with a medical diagnosis of lower back pain. Kathi is recently out of CAM walker from Right achilles tendon repair and removal of Haglunds deformity.  Kathi is not here for any ankle/foot rehab per Dr. Plaza's instructions. Kathi demonstrates generalized lumbar pain as a result of decreased activity, walking with CAM boot on as well as history of DDD and demonstrates limitations as described in the problem list. Pt prognosis is Good. Pt will continue to benefit from skilled outpatient physical therapy to address the deficits listed in  the problem list, provide pt/family education and to maximize pt's level of independence in the home and community environment.     LONG TERM GOALS:  Long Term Goals: 6 weeks     Pain: Decrease pain to no more than 3/10 to allow for improved ability to perform daily activities   Strength: Improve strength in core muscles to at least 4/5 for improved lumbopelvic stability  ROM: Improve ROM to 80%  of normal limits   Functional scale: Improve score on Oswestry  to < 5% limitation   Walking: Increase walking distance and/or duration to 6 blocks without pain   Postures: Increase sitting and/or standing duration to 30 mins  without pain   Transfers: Perform all  transfers without increased pain or limitation  Exercise: demonstrate independence with home exercise program to maintain gains made in therapy.       Plan     Continue with established Plan of Care towards PT goals.    Therapist: Jonathan Favre, PTA  9/21/2020

## 2020-09-22 ENCOUNTER — HOSPITAL ENCOUNTER (OUTPATIENT)
Dept: RADIOLOGY | Facility: HOSPITAL | Age: 70
Discharge: HOME | End: 2020-09-22
Attending: SURGERY
Payer: MEDICARE

## 2020-09-22 DIAGNOSIS — R10.84 GENERALIZED ABDOMINAL PAIN: ICD-10-CM

## 2020-09-22 PROCEDURE — 78227 NM HEPATOBILIARY(HIDA) WITH PHARM AND EF: ICD-10-PCS | Mod: 26,,, | Performed by: RADIOLOGY

## 2020-09-22 PROCEDURE — 78227 HEPATOBIL SYST IMAGE W/DRUG: CPT | Mod: 26,,, | Performed by: RADIOLOGY

## 2020-09-22 PROCEDURE — A9537 TC99M MEBROFENIN: HCPCS

## 2020-09-24 ENCOUNTER — CLINICAL SUPPORT (OUTPATIENT)
Dept: REHABILITATION | Facility: HOSPITAL | Age: 70
End: 2020-09-24
Payer: MEDICARE

## 2020-09-24 ENCOUNTER — TELEPHONE (OUTPATIENT)
Dept: SURGERY | Facility: CLINIC | Age: 70
End: 2020-09-24

## 2020-09-24 DIAGNOSIS — M54.50 LUMBAR PAIN: Primary | ICD-10-CM

## 2020-09-24 PROCEDURE — 97110 THERAPEUTIC EXERCISES: CPT | Mod: PN,CQ

## 2020-09-24 NOTE — TELEPHONE ENCOUNTER
Writer spoke to patient and let her know that she will get her test results from her HIDA at her scheduled appt on Mon 09/28/20 @ 10:45 am. Pt expressed verbal understanding.

## 2020-09-24 NOTE — PROGRESS NOTES
Physical Therapy Daily Note     Name: Kathi Baker Eaton  Clinic Number: [id number]  Diagnosis:   Encounter Diagnosis   Name Primary?    Lumbar pain Yes     Physician: Clare Pan NP  Precautions: Standard  Visit #: 7 of 12  PTA Visit #: 2  Time In: 8:45 AM   Time Out: 9:35 AM    Subjective     Pt reports: No new c/o's.   Pain Scale: Kathi rates pain on a scale of 0-10 to be 0 currently.    Objective     Kathi received individual therapeutic exercises to develop strength, endurance and posture for 45 minutes including:    Recumbent Bike BC Level 5 x 20 mins  Pelvic Tilts x 20  Bridges x 20  Ball Squeezes x 3 mins  DKC w/ SB x 3 mins  Lumbar Rolls w/ SB x 3 mins  Prone press ups on elbows x 10  Prone Alt A/L x 10  Quad. Alt A/L x 10  Cat/Camel x 10  3 Way Scapular Retraction x 20 w/ Black TB      Written Home Exercises Provided:   Pt demo good understanding of the education provided. Kathi demonstrated good return demonstration of activities.     Education provided re:  Kathi verbalized good understanding of education provided.   No spiritual or educational barriers to learning provided    Assessment     Patient tolerated treatment fairly well; vc's needed for exercise technique; progress as tolerated to improve strength and mobility and decrease pain.   This is a 69 y.o. female referred to outpatient physical therapy and presents with a medical diagnosis of lower back pain. Kathi is recently out of CAM walker from Right achilles tendon repair and removal of Haglunds deformity.  Kathi is not here for any ankle/foot rehab per Dr. Plaza's instructions. Kathi demonstrates generalized lumbar pain as a result of decreased activity, walking with CAM boot on as well as history of DDD and demonstrates limitations as described in the problem list. Pt prognosis is Good. Pt will continue to benefit from skilled outpatient physical therapy to address the deficits listed in  the problem list, provide pt/family education and to maximize pt's level of independence in the home and community environment.     LONG TERM GOALS:  Long Term Goals: 6 weeks     Pain: Decrease pain to no more than 3/10 to allow for improved ability to perform daily activities   Strength: Improve strength in core muscles to at least 4/5 for improved lumbopelvic stability  ROM: Improve ROM to 80%  of normal limits   Functional scale: Improve score on Oswestry  to < 5% limitation   Walking: Increase walking distance and/or duration to 6 blocks without pain   Postures: Increase sitting and/or standing duration to 30 mins  without pain   Transfers: Perform all  transfers without increased pain or limitation  Exercise: demonstrate independence with home exercise program to maintain gains made in therapy.       Plan     Continue with established Plan of Care towards PT goals.    Therapist: Jonathan Favre, PTA  9/24/2020

## 2020-09-28 ENCOUNTER — OFFICE VISIT (OUTPATIENT)
Dept: SURGERY | Facility: CLINIC | Age: 70
End: 2020-09-28
Payer: MEDICARE

## 2020-09-28 VITALS
HEART RATE: 79 BPM | OXYGEN SATURATION: 96 % | TEMPERATURE: 99 F | WEIGHT: 157.81 LBS | BODY MASS INDEX: 24.77 KG/M2 | RESPIRATION RATE: 18 BRPM | SYSTOLIC BLOOD PRESSURE: 124 MMHG | DIASTOLIC BLOOD PRESSURE: 75 MMHG | HEIGHT: 67 IN

## 2020-09-28 DIAGNOSIS — R19.8 BORBORYGMI: Primary | ICD-10-CM

## 2020-09-28 PROCEDURE — 1159F MED LIST DOCD IN RCRD: CPT | Mod: S$GLB,,, | Performed by: SURGERY

## 2020-09-28 PROCEDURE — 1101F PT FALLS ASSESS-DOCD LE1/YR: CPT | Mod: CPTII,S$GLB,, | Performed by: SURGERY

## 2020-09-28 PROCEDURE — 1126F PR PAIN SEVERITY QUANTIFIED, NO PAIN PRESENT: ICD-10-PCS | Mod: S$GLB,,, | Performed by: SURGERY

## 2020-09-28 PROCEDURE — 99213 PR OFFICE/OUTPT VISIT, EST, LEVL III, 20-29 MIN: ICD-10-PCS | Mod: S$GLB,,, | Performed by: SURGERY

## 2020-09-28 PROCEDURE — 1159F PR MEDICATION LIST DOCUMENTED IN MEDICAL RECORD: ICD-10-PCS | Mod: S$GLB,,, | Performed by: SURGERY

## 2020-09-28 PROCEDURE — 3078F DIAST BP <80 MM HG: CPT | Mod: CPTII,S$GLB,, | Performed by: SURGERY

## 2020-09-28 PROCEDURE — 99213 OFFICE O/P EST LOW 20 MIN: CPT | Mod: S$GLB,,, | Performed by: SURGERY

## 2020-09-28 PROCEDURE — 3078F PR MOST RECENT DIASTOLIC BLOOD PRESSURE < 80 MM HG: ICD-10-PCS | Mod: CPTII,S$GLB,, | Performed by: SURGERY

## 2020-09-28 PROCEDURE — 1126F AMNT PAIN NOTED NONE PRSNT: CPT | Mod: S$GLB,,, | Performed by: SURGERY

## 2020-09-28 PROCEDURE — 3008F BODY MASS INDEX DOCD: CPT | Mod: CPTII,S$GLB,, | Performed by: SURGERY

## 2020-09-28 PROCEDURE — 1101F PR PT FALLS ASSESS DOC 0-1 FALLS W/OUT INJ PAST YR: ICD-10-PCS | Mod: CPTII,S$GLB,, | Performed by: SURGERY

## 2020-09-28 PROCEDURE — 3074F PR MOST RECENT SYSTOLIC BLOOD PRESSURE < 130 MM HG: ICD-10-PCS | Mod: CPTII,S$GLB,, | Performed by: SURGERY

## 2020-09-28 PROCEDURE — 3074F SYST BP LT 130 MM HG: CPT | Mod: CPTII,S$GLB,, | Performed by: SURGERY

## 2020-09-28 PROCEDURE — 3008F PR BODY MASS INDEX (BMI) DOCUMENTED: ICD-10-PCS | Mod: CPTII,S$GLB,, | Performed by: SURGERY

## 2020-09-28 NOTE — PROGRESS NOTES
Subjective:       Patient ID: Kathi Head is a 69 y.o. female.    Chief Complaint: Follow-up (HIDA 9/22/30)      HPI:  Ms. Head returns today with no new complaints.  She continues to experience borborygmi.  She is not experiencing any abdominal pain, nausea, vomiting, diarrhea, constipation, melena, hematochezia, hematemesis, fever, chills, night sweats, weight loss, etc.  No other associated symptoms.  No aggravating or alleviating factors.  No triggering issues.  Extensive evaluation including EGD, colonoscopy, CT scan of the abdomen and pelvis, ultrasound, and HIDA scan have all revealed no evidence of pathology.      Allergies & Meds:  Review of patient's allergies indicates:   Allergen Reactions    Hydrocodone Rash       Current Outpatient Medications   Medication Sig Dispense Refill    cetirizine (ZYRTEC) 10 MG tablet Take 10 mg by mouth once daily.      cyclobenzaprine (FLEXERIL) 10 MG tablet TAKE 1 TABLET BY MOUTH 3 TIMES A DAY AS NEEDED FOR SPASM      DULoxetine (CYMBALTA) 30 MG capsule       escitalopram oxalate (LEXAPRO) 5 MG Tab Take 1 tablet (5 mg total) by mouth nightly. 30 tablet 1    gabapentin (NEURONTIN) 300 MG capsule       HYDROcodone-acetaminophen (NORCO)  mg per tablet       hydrocortisone (PROCTO-SILKE) 1 % crpe Place 1 applicator rectally 4 (four) times daily. 28.4 g 11    levothyroxine (SYNTHROID) 100 MCG tablet Take 1 tablet (100 mcg total) by mouth once daily. (Patient not taking: Reported on 9/11/2020) 90 tablet 0    loratadine (CLARITIN) 10 mg tablet Take 10 mg by mouth once daily.      magnesium oxide (MAG-OX) 400 mg (241.3 mg magnesium) tablet       multivitamin/iron/folic acid (CENTRUM ORAL) Take by mouth.      omega 3-dha-epa-fish oil 1,000 mg (120 mg-180 mg) Cap       pantoprazole (PROTONIX) 40 MG tablet Take 1 tablet (40 mg total) by mouth once daily. Take in the morning before breakfast.  Wait 30 minutes before eating or drinking anything (Patient  not taking: Reported on 9/11/2020) 30 tablet 11    prenatal no122-iron-folic acid  mg-mcg Tab       simvastatin (ZOCOR) 20 MG tablet        No current facility-administered medications for this visit.        PMFSHx:    Past Medical History:   Diagnosis Date    Allergy     GERD (gastroesophageal reflux disease)     Hyperlipidemia     Neck pain 12/9/2019    Shingles     Thyroid disease        Past Surgical History:   Procedure Laterality Date    APPENDECTOMY      COLONOSCOPY N/A 10/2/2018    Procedure: COLONOSCOPY;  Surgeon: Maci Sandoval MD;  Location: [facility] ENDO;  Service: Endoscopy;  Laterality: N/A;    COLONOSCOPY N/A 2/21/2020    Procedure: COLONOSCOPY;  Surgeon: Tra Pedraza MD;  Location: [facility] ENDO;  Service: General;  Laterality: N/A;    ESOPHAGOGASTRODUODENOSCOPY  02/09/2018    Dr Tra Pedraza[facility]    ESOPHAGOGASTRODUODENOSCOPY N/A 2/21/2020    Procedure: ESOPHAGOGASTRODUODENOSCOPY (EGD);  Surgeon: Tra Pedraza MD;  Location[facility];  Service: General;  Laterality: N/A;    HYSTERECTOMY      NASAL SEPTUM SURGERY      REPAIR OF ACHILLES TENDON Right 6/12/2020    Procedure: REPAIR, TENDON, ACHILLES;  Surgeon: Teofilo Plaza DPM;  Location: [facility] OR;  Service: Podiatry;  Laterality: Right;    SURGICAL REMOVAL OF RETROCALCANEAL EXOSTOSIS Right 6/12/2020    Procedure: EXCISION, EXOSTOSIS, RETROCALCANEAL;  Surgeon: Teofilo Plaza DPM;  Location: [facility] OR;  Service: Podiatry;  Laterality: Right;    THYROID SURGERY         Family History   Problem Relation Age of Onset    Breast cancer Neg Hx     Eczema Neg Hx     Lupus Neg Hx     Psoriasis Neg Hx     Melanoma Neg Hx        Social History     Tobacco Use    Smoking status: Never Smoker    Smokeless tobacco: Never Used   Substance Use Topics    Alcohol use: No    Drug use: No         Review of Systems   Constitutional: Negative for appetite change, chills, fatigue, fever and unexpected weight  change.   HENT: Negative for congestion, dental problem, ear pain, mouth sores, postnasal drip, rhinorrhea, sore throat, tinnitus, trouble swallowing and voice change.    Eyes: Negative for photophobia, pain, discharge and visual disturbance.   Respiratory: Negative for cough, chest tightness, shortness of breath and wheezing.    Cardiovascular: Negative for chest pain, palpitations and leg swelling.   Gastrointestinal: Negative for abdominal pain, blood in stool, constipation, diarrhea, nausea and vomiting.   Endocrine: Negative for cold intolerance, heat intolerance, polydipsia, polyphagia and polyuria.   Genitourinary: Negative for difficulty urinating, dysuria, flank pain, frequency, hematuria and urgency.   Musculoskeletal: Negative for arthralgias, joint swelling and myalgias.   Skin: Negative for color change and rash.   Allergic/Immunologic: Negative for immunocompromised state.   Neurological: Negative for dizziness, tremors, seizures, syncope, speech difficulty, weakness, numbness and headaches.   Hematological: Negative for adenopathy. Does not bruise/bleed easily.   Psychiatric/Behavioral: Negative for agitation, confusion, hallucinations, self-injury and suicidal ideas. The patient is not nervous/anxious.        Objective:      Physical Exam  Constitutional:       General: She is not in acute distress.     Appearance: Normal appearance. She is well-developed. She is not ill-appearing or toxic-appearing.      Comments: Body mass index is 24.71 kg/m².   HENT:      Head: Normocephalic and atraumatic.      Right Ear: Hearing and external ear normal.      Left Ear: Hearing and external ear normal.      Nose: Nose normal.   Eyes:      General: Lids are normal. No scleral icterus.     Conjunctiva/sclera: Conjunctivae normal.   Neck:      Musculoskeletal: Normal range of motion and neck supple.      Thyroid: No thyroid mass or thyromegaly.      Vascular: No carotid bruit or JVD.      Trachea: Trachea and  phonation normal.   Cardiovascular:      Rate and Rhythm: Normal rate and regular rhythm.      Chest Wall: PMI is not displaced.      Heart sounds: Normal heart sounds. No murmur. No gallop.    Pulmonary:      Effort: Pulmonary effort is normal. No tachypnea, accessory muscle usage or respiratory distress.      Breath sounds: Normal breath sounds.   Abdominal:      General: Bowel sounds are normal.      Palpations: Abdomen is soft.      Tenderness: There is no abdominal tenderness.      Hernia: No hernia is present.   Lymphadenopathy:      Cervical: No cervical adenopathy.      Upper Body:      Right upper body: No supraclavicular adenopathy.      Left upper body: No supraclavicular adenopathy.   Skin:     General: Skin is warm and dry.      Findings: No rash.      Nails: There is no clubbing.     Neurological:      Mental Status: She is alert and oriented to person, place, and time. She is not disoriented.      GCS: GCS eye subscore is 4. GCS verbal subscore is 5. GCS motor subscore is 6.   Psychiatric:         Attention and Perception: She is attentive.         Speech: Speech normal.         Behavior: Behavior normal.         Thought Content: Thought content normal.         Judgment: Judgment normal.           Medical Records Review:  CT scan of the abdomen pelvis films and report reviewed from 9/18/2018.  There was mild right-sided hydronephrosis without evident etiology and postoperative changes consistent with her hysterectomy.    Gallbladder ultrasound films and report were reviewed from 1/20/2020.  Mild hepatic steatosis was present but no evidence of cholelithiasis, cholecystitis, dilated bile ducts, etc.    HIDA scan films and report were reviewed from 9/22/2020.  No evidence of cystic duct or common bile duct obstruction.  Ejection fraction 73%.    EGD and colonoscopy reports reviewed from 2/21/2020.  EGD revealed mild chronic gastritis.  Colonoscopy revealed hemorrhoids and an anal fissure.  Pathology  report reviewed from 2/21/2020.  Duodenal biopsies revealed minimal reactive changes.  Gastric biopsies revealed reactive changes, Helicobacter negative.  Esophageal biopsies were unremarkable.    Lab results reviewed from 6/3/2020.  CBC showed no evidence of leukocytosis, anemia, or thrombocytopenia.  Electrolytes were all in the range of normal except a mild hypocalcemia.  BUN and creatinine showed no evidence of renal dysfunction.  Glucose showed no suspicion diabetes.  Liver profile showed no evidence of hepatocellular disease or biliary obstruction.          Assessment:       Borborygmi, etiology undetermined.  Extensive evaluation thus far has not revealed any significant pathology.    1. Borborygmi          Plan:     Borborygmi          Follow up for any concerns or questions as needed, resume care with PCP.    Counseling/Medical Decision Making:  Ms. Head was counseled regarding the results of her evaluation thus far.  She was informed that it is likely her borborygmi are normal and not a sign of significant underlying pathology.  We reviewed her extensive evaluation thus far.  Options we discussed included were not limited to repeat CT scan or laparoscopy.  We also discussed options of referral to gastroenterology.  Questions were solicited and answered.  She voiced understanding.  She is encouraged to return to clinic should she note any abdominal pain, nausea, vomiting, diarrhea, constipation, weight loss, or any other new/concerning symptoms.    Total face-to-face encountered time was 15 minutes, 10 minutes spent counseling the patient as outlined/summarized above.

## 2020-10-01 ENCOUNTER — CLINICAL SUPPORT (OUTPATIENT)
Dept: REHABILITATION | Facility: HOSPITAL | Age: 70
End: 2020-10-01
Payer: MEDICARE

## 2020-10-01 DIAGNOSIS — R26.2 DIFFICULTY WALKING: ICD-10-CM

## 2020-10-01 DIAGNOSIS — M54.50 LUMBAR PAIN: Primary | ICD-10-CM

## 2020-10-01 PROCEDURE — 97110 THERAPEUTIC EXERCISES: CPT | Mod: PN

## 2020-10-01 NOTE — PROGRESS NOTES
Physical Therapy Daily Note     Name: Kathi Baker Coward  Clinic Number: [id number]  Diagnosis:   Encounter Diagnoses   Name Primary?    Difficulty walking     Lumbar pain Yes     Physician: Clare Pan NP  Precautions: Standard  Visit #: 8 of 12  PTA Visit #: 2  Time In: 9:20 am    Time Out: 10:20 am     Subjective     Pt reports: No new c/o's. Continues to note on lower back pain; wants to start walking again but stated that her heel is  from surgery.   Pain Scale: Kathi rates pain on a scale of 0-10 to be 0 currently.    Objective     Kathi received individual therapeutic exercises to develop strength, endurance and posture for 45 minutes including:    Recumbent Bike BC Level 7 x 15 mins  Pelvic Tilts x 20  Bridges x 20  S/L Hip Abduction x 20   Clams x 20   Ball Squeezes x 3 mins  DKC w/ SB x 3 mins  Lumbar Rolls w/ SB x 3 mins  Prone press ups on elbows x 10  Prone Alt A/L x 10  Quad. Alt A/L x 10  Cat/Camel x 10  3 Way Scapular Retraction x 20 w/ Gray  TB      Written Home Exercises Provided:   Achilles/gastroc stretching on Right to ensure that she maintains full ROM in her ankle dorsiflexion   Pt demo good understanding of the education provided. Kathi demonstrated good return demonstration of activities.     Education provided re:  Gait: Kathi continues to come into therapy walking on the backs of her shoes - she stated that she doesn't want to hurt the surgery site; Read Dr. Plaza's last note from 9/17 indicating that she was cleared to resume activities; Educated Kathi on proper footwear for walking - needs to lace up her shoes for support; has a lift to wear in the heel of her right shoe to decreased excursion of achilles on this side. Told her that it was ok to start her walking again.   Kathi verbalized good understanding of education provided.   No spiritual or educational barriers to learning provided    Assessment     Patient  tolerated treatment fairly well;  Kathi is demonstrating some memory decline from PT session to session; she is unable to recall the exercises that we do, did not realize that she needed to be exercising her right foot, that she needed to wear shoes that offer improved support; She requires vc's every session for exercise technique; progress as tolerated to improve strength and mobility and decrease pain.   This is a 69 y.o. female referred to outpatient physical therapy and presents with a medical diagnosis of lower back pain. Kathi is  out of CAM walker from Right achilles tendon repair and removal of Haglunds deformity now for over a month.   Kathi is not here for any ankle/foot rehab per Dr. Plaza's instructions. Kathi demonstrates generalized lumbar pain as a result of decreased activity, walking with CAM boot on as well as history of DDD and demonstrates limitations as described in the problem list. Pt prognosis is Good. Pt will continue to benefit from skilled outpatient physical therapy to address the deficits listed in the problem list, provide pt/family education and to maximize pt's level of independence in the home and community environment.     LONG TERM GOALS:  Long Term Goals: 6 weeks     Pain: Decrease pain to no more than 3/10 to allow for improved ability to perform daily activities   Strength: Improve strength in core muscles to at least 4/5 for improved lumbopelvic stability  ROM: Improve ROM to 80%  of normal limits   Functional scale: Improve score on Oswestry  to < 5% limitation   Walking: Increase walking distance and/or duration to 6 blocks without pain   Postures: Increase sitting and/or standing duration to 30 mins  without pain   Transfers: Perform all  transfers without increased pain or limitation  Exercise: demonstrate independence with home exercise program to maintain gains made in therapy.       Plan     Continue with established Plan of Care towards PT goals.    Therapist: Menea  Ashley, PT  10/1/2020

## 2020-10-05 ENCOUNTER — CLINICAL SUPPORT (OUTPATIENT)
Dept: REHABILITATION | Facility: HOSPITAL | Age: 70
End: 2020-10-05
Payer: MEDICARE

## 2020-10-05 DIAGNOSIS — M54.50 LUMBAR PAIN: Primary | ICD-10-CM

## 2020-10-05 PROCEDURE — 97110 THERAPEUTIC EXERCISES: CPT | Mod: PN,CQ

## 2020-10-05 NOTE — PROGRESS NOTES
Physical Therapy Daily Note     Name: Kathi Baker [facility] Number: [id number]  Diagnosis:   Encounter Diagnosis   Name Primary?    Lumbar pain Yes     Physician: Clare Pan NP  Precautions: Standard  Visit #: 9 of 12  PTA Visit #: 1  Time In: 2:45 PM    Time Out: 3:45 PM     Subjective     Pt reports: No new c/o's. Continues to note on lower back pain; wants to start walking again but stated that her heel is  from surgery.   Pain Scale: Kathi rates pain on a scale of 0-10 to be 0 currently.    Objective     Kathi received individual therapeutic exercises to develop strength, endurance and posture for 45 minutes including:    Recumbent Bike BC Level 7 x 20 mins  Pelvic Tilts x 20  Bridges x 20  S/L Hip Abduction x 20   Clams x 20   Ball Squeezes x 3 mins  DKC w/ SB x 3 mins  Lumbar Rolls w/ SB x 3 mins  Prone press ups on elbows x 10  Prone Alt A/L x 10  Quad. Alt A/L x 10  Cat/Camel x 10  3 Way Scapular Retraction x 20 w/ Gray  TB      Written Home Exercises Provided:   Achilles/gastroc stretching on Right to ensure that she maintains full ROM in her ankle dorsiflexion   Pt demo good understanding of the education provided. Kathi demonstrated good return demonstration of activities.     Education provided re:  Gait: Kathi continues to come into therapy walking on the backs of her shoes - she stated that she doesn't want to hurt the surgery site; Read Dr. Plaza's last note from 9/17 indicating that she was cleared to resume activities; Educated Kathi on proper footwear for walking - needs to lace up her shoes for support; has a lift to wear in the heel of her right shoe to decreased excursion of achilles on this side. Told her that it was ok to start her walking again.   Kathi verbalized good understanding of education provided.   No spiritual or educational barriers to learning provided    Assessment     Patient tolerated treatment fairly well;   Kathi is demonstrating some memory decline from PT session to session; she is unable to recall the exercises that we do, did not realize that she needed to be exercising her right foot, that she needed to wear shoes that offer improved support; She requires vc's every session for exercise technique; progress as tolerated to improve strength and mobility and decrease pain.   This is a 69 y.o. female referred to outpatient physical therapy and presents with a medical diagnosis of lower back pain. Kathi is  out of CAM walker from Right achilles tendon repair and removal of Haglunds deformity now for over a month.   Kathi is not here for any ankle/foot rehab per Dr. Plaza's instructions. Kathi demonstrates generalized lumbar pain as a result of decreased activity, walking with CAM boot on as well as history of DDD and demonstrates limitations as described in the problem list. Pt prognosis is Good. Pt will continue to benefit from skilled outpatient physical therapy to address the deficits listed in the problem list, provide pt/family education and to maximize pt's level of independence in the home and community environment.     LONG TERM GOALS:  Long Term Goals: 6 weeks     Pain: Decrease pain to no more than 3/10 to allow for improved ability to perform daily activities   Strength: Improve strength in core muscles to at least 4/5 for improved lumbopelvic stability  ROM: Improve ROM to 80%  of normal limits   Functional scale: Improve score on Oswestry  to < 5% limitation   Walking: Increase walking distance and/or duration to 6 blocks without pain   Postures: Increase sitting and/or standing duration to 30 mins  without pain   Transfers: Perform all  transfers without increased pain or limitation  Exercise: demonstrate independence with home exercise program to maintain gains made in therapy.       Plan     Continue with established Plan of Care towards PT goals.    Therapist: Jonathan Favre, PTA  10/5/2020

## 2020-10-22 ENCOUNTER — OFFICE VISIT (OUTPATIENT)
Dept: PODIATRY | Facility: CLINIC | Age: 70
End: 2020-10-22
Payer: MEDICARE

## 2020-10-22 VITALS
HEIGHT: 67 IN | TEMPERATURE: 98 F | WEIGHT: 160 LBS | DIASTOLIC BLOOD PRESSURE: 77 MMHG | BODY MASS INDEX: 25.11 KG/M2 | HEART RATE: 79 BPM | SYSTOLIC BLOOD PRESSURE: 120 MMHG

## 2020-10-22 DIAGNOSIS — S86.011D ACHILLES RUPTURE, RIGHT, SUBSEQUENT ENCOUNTER: Primary | ICD-10-CM

## 2020-10-22 DIAGNOSIS — L90.5 SCAR TISSUE: ICD-10-CM

## 2020-10-22 PROCEDURE — 3008F PR BODY MASS INDEX (BMI) DOCUMENTED: ICD-10-PCS | Mod: CPTII,S$GLB,, | Performed by: PODIATRIST

## 2020-10-22 PROCEDURE — 1101F PR PT FALLS ASSESS DOC 0-1 FALLS W/OUT INJ PAST YR: ICD-10-PCS | Mod: CPTII,S$GLB,, | Performed by: PODIATRIST

## 2020-10-22 PROCEDURE — 99999 PR PBB SHADOW E&M-EST. PATIENT-LVL IV: CPT | Mod: PBBFAC,,, | Performed by: PODIATRIST

## 2020-10-22 PROCEDURE — 99213 OFFICE O/P EST LOW 20 MIN: CPT | Mod: S$GLB,,, | Performed by: PODIATRIST

## 2020-10-22 PROCEDURE — 1159F PR MEDICATION LIST DOCUMENTED IN MEDICAL RECORD: ICD-10-PCS | Mod: S$GLB,,, | Performed by: PODIATRIST

## 2020-10-22 PROCEDURE — 3074F PR MOST RECENT SYSTOLIC BLOOD PRESSURE < 130 MM HG: ICD-10-PCS | Mod: CPTII,S$GLB,, | Performed by: PODIATRIST

## 2020-10-22 PROCEDURE — 3074F SYST BP LT 130 MM HG: CPT | Mod: CPTII,S$GLB,, | Performed by: PODIATRIST

## 2020-10-22 PROCEDURE — 3008F BODY MASS INDEX DOCD: CPT | Mod: CPTII,S$GLB,, | Performed by: PODIATRIST

## 2020-10-22 PROCEDURE — 99999 PR PBB SHADOW E&M-EST. PATIENT-LVL IV: ICD-10-PCS | Mod: PBBFAC,,, | Performed by: PODIATRIST

## 2020-10-22 PROCEDURE — 3078F PR MOST RECENT DIASTOLIC BLOOD PRESSURE < 80 MM HG: ICD-10-PCS | Mod: CPTII,S$GLB,, | Performed by: PODIATRIST

## 2020-10-22 PROCEDURE — 99213 PR OFFICE/OUTPT VISIT, EST, LEVL III, 20-29 MIN: ICD-10-PCS | Mod: S$GLB,,, | Performed by: PODIATRIST

## 2020-10-22 PROCEDURE — 1101F PT FALLS ASSESS-DOCD LE1/YR: CPT | Mod: CPTII,S$GLB,, | Performed by: PODIATRIST

## 2020-10-22 PROCEDURE — 1159F MED LIST DOCD IN RCRD: CPT | Mod: S$GLB,,, | Performed by: PODIATRIST

## 2020-10-22 PROCEDURE — 3078F DIAST BP <80 MM HG: CPT | Mod: CPTII,S$GLB,, | Performed by: PODIATRIST

## 2020-10-22 PROCEDURE — 1125F PR PAIN SEVERITY QUANTIFIED, PAIN PRESENT: ICD-10-PCS | Mod: S$GLB,,, | Performed by: PODIATRIST

## 2020-10-22 PROCEDURE — 1125F AMNT PAIN NOTED PAIN PRSNT: CPT | Mod: S$GLB,,, | Performed by: PODIATRIST

## 2020-10-22 RX ORDER — MELOXICAM 15 MG/1
15 TABLET ORAL DAILY
Qty: 30 TABLET | Refills: 1 | Status: SHIPPED | OUTPATIENT
Start: 2020-10-22 | End: 2020-11-21

## 2020-10-22 RX ORDER — INFLUENZA A VIRUS A/MICHIGAN/45/2015 X-275 (H1N1) ANTIGEN (FORMALDEHYDE INACTIVATED), INFLUENZA A VIRUS A/SINGAPORE/INFIMH-16-0019/2016 IVR-186 (H3N2) ANTIGEN (FORMALDEHYDE INACTIVATED), INFLUENZA B VIRUS B/PHUKET/3073/2013 ANTIGEN (FORMALDEHYDE INACTIVATED), AND INFLUENZA B VIRUS B/MARYLAND/15/2016 BX-69A ANTIGEN (FORMALDEHYDE INACTIVATED) 60; 60; 60; 60 UG/.7ML; UG/.7ML; UG/.7ML; UG/.7ML
INJECTION, SUSPENSION INTRAMUSCULAR
COMMUNITY
Start: 2020-09-19

## 2020-10-22 NOTE — LETTER
October 25, 2020      Clare Pan NP  [address]  [place] [address]           [facility] - Podiatry/Wound Care  [address]  [place] [address]  Phone: [phone]  Fax: [phone]          Patient: Kathi Head   MR Number: [id number]   YOB: 1950   Date of Visit: 10/22/2020       Dear Clare Pan:    Thank you for referring Kathi Head to me for evaluation. Attached you will find relevant portions of my assessment and plan of care.    If you have questions, please do not hesitate to call me. I look forward to following Kathi Head along with you.    Sincerely,    Teofilo Plaza, JEREMÍAS    Enclosure  CC:  No Recipients    If you would like to receive this communication electronically, please contact [email or web address] or [phone] to request more information on [employer] Link access.    For providers and/or their staff who would like to refer a patient to [facility], please contact us through our one-stop-shop provider referral line, [facility], at [phone].    If you feel you have received this communication in error or would no longer like to receive these types of communications, please e-mail [email or web address]

## 2020-10-25 NOTE — PROGRESS NOTES
"Kathi Head is a 70 y.o. female patient.   1. Achilles rupture, right, subsequent encounter    2. Scar tissue          Past Medical History:   Diagnosis Date    Allergy     GERD (gastroesophageal reflux disease)     Hyperlipidemia     Neck pain 12/9/2019    Shingles     Thyroid disease      No past surgical history pertinent negatives on file.  Scheduled Meds:  Continuous Infusions:  PRN Meds:    Review of patient's allergies indicates:   Allergen Reactions    Hydrocodone Rash     There are no hospital problems to display for this patient.    Blood pressure 120/77, pulse 79, temperature 98 °F (36.7 °C), height 5' 7" (1.702 m), weight 72.6 kg (160 lb).                                            Subjective  Objective:  Vital signs (most recent): Blood pressure 120/77, pulse 79, temperature 98 °F (36.7 °C), height 5' 7" (1.702 m), weight 72.6 kg (160 lb).     Assessment & Plan     Patient presents status post excision posterior calcaneal exostosis and Achilles tendon repair right.   Patient states she is doing very well she is not having any pain or discomfort she is wearing normal shoes and states she is not really having any pain just a bit little bit of stiffness at the back of the right heel.  Patient has very good range of motion with no limitation this includes dorsiflexion and plantar flexion she states she is going to be starting physical therapy for her back because of a fall that she took.  Patient advised I feel she is doing very well she is getting some swelling however in the right foot which is consistent with her current postoperative status and the amount of time she was immobilized nonweightbearing.  I have referred the patient to community physical therapy I think the patient would significantly benefit from physical therapy to help restore range of motion breakdown scar tissue.  Patient is taking Mobic 15 mg daily to help with inflammation I have also dispensed the patient a " compressive sleeve for swelling and plan to follow up with her in 4-5 weeks after she has been going to her physical therapy multiple times for re-evaluation.  Patient is beyond her global period following this procedure.  This note was created using [employer] voice recognition software that occasionally misinterpreted phrases or words.  Teofilo Plaza DPM  10/25/2020

## 2020-12-03 ENCOUNTER — OFFICE VISIT (OUTPATIENT)
Dept: PODIATRY | Facility: CLINIC | Age: 70
End: 2020-12-03
Payer: MEDICARE

## 2020-12-03 VITALS
WEIGHT: 160 LBS | SYSTOLIC BLOOD PRESSURE: 119 MMHG | HEART RATE: 80 BPM | TEMPERATURE: 98 F | HEIGHT: 67 IN | BODY MASS INDEX: 25.11 KG/M2 | DIASTOLIC BLOOD PRESSURE: 73 MMHG

## 2020-12-03 DIAGNOSIS — M72.2 PLANTAR FASCIITIS: Primary | ICD-10-CM

## 2020-12-03 PROCEDURE — 3074F SYST BP LT 130 MM HG: CPT | Mod: CPTII,S$GLB,, | Performed by: PODIATRIST

## 2020-12-03 PROCEDURE — 3078F DIAST BP <80 MM HG: CPT | Mod: CPTII,S$GLB,, | Performed by: PODIATRIST

## 2020-12-03 PROCEDURE — 3074F PR MOST RECENT SYSTOLIC BLOOD PRESSURE < 130 MM HG: ICD-10-PCS | Mod: CPTII,S$GLB,, | Performed by: PODIATRIST

## 2020-12-03 PROCEDURE — 1159F PR MEDICATION LIST DOCUMENTED IN MEDICAL RECORD: ICD-10-PCS | Mod: S$GLB,,, | Performed by: PODIATRIST

## 2020-12-03 PROCEDURE — 99999 PR PBB SHADOW E&M-EST. PATIENT-LVL IV: ICD-10-PCS | Mod: PBBFAC,,, | Performed by: PODIATRIST

## 2020-12-03 PROCEDURE — 99999 PR PBB SHADOW E&M-EST. PATIENT-LVL IV: CPT | Mod: PBBFAC,,, | Performed by: PODIATRIST

## 2020-12-03 PROCEDURE — 99213 PR OFFICE/OUTPT VISIT, EST, LEVL III, 20-29 MIN: ICD-10-PCS | Mod: S$GLB,,, | Performed by: PODIATRIST

## 2020-12-03 PROCEDURE — 3008F PR BODY MASS INDEX (BMI) DOCUMENTED: ICD-10-PCS | Mod: CPTII,S$GLB,, | Performed by: PODIATRIST

## 2020-12-03 PROCEDURE — 99213 OFFICE O/P EST LOW 20 MIN: CPT | Mod: S$GLB,,, | Performed by: PODIATRIST

## 2020-12-03 PROCEDURE — 1125F PR PAIN SEVERITY QUANTIFIED, PAIN PRESENT: ICD-10-PCS | Mod: S$GLB,,, | Performed by: PODIATRIST

## 2020-12-03 PROCEDURE — 3078F PR MOST RECENT DIASTOLIC BLOOD PRESSURE < 80 MM HG: ICD-10-PCS | Mod: CPTII,S$GLB,, | Performed by: PODIATRIST

## 2020-12-03 PROCEDURE — 1159F MED LIST DOCD IN RCRD: CPT | Mod: S$GLB,,, | Performed by: PODIATRIST

## 2020-12-03 PROCEDURE — 3008F BODY MASS INDEX DOCD: CPT | Mod: CPTII,S$GLB,, | Performed by: PODIATRIST

## 2020-12-03 PROCEDURE — 1125F AMNT PAIN NOTED PAIN PRSNT: CPT | Mod: S$GLB,,, | Performed by: PODIATRIST

## 2020-12-03 RX ORDER — DOXYCYCLINE 100 MG/1
100 CAPSULE ORAL DAILY
Status: ON HOLD | COMMUNITY
Start: 2020-11-17 | End: 2021-07-09 | Stop reason: HOSPADM

## 2020-12-06 NOTE — PROGRESS NOTES
"Kathi Head is a 70 y.o. female patient.   1. Plantar fasciitis          Past Medical History:   Diagnosis Date    Allergy     GERD (gastroesophageal reflux disease)     Hyperlipidemia     Neck pain 12/9/2019    Shingles     Thyroid disease      No past surgical history pertinent negatives on file.  Scheduled Meds:  Continuous Infusions:  PRN Meds:    Review of patient's allergies indicates:   Allergen Reactions    Hydrocodone Rash     There are no hospital problems to display for this patient.    Blood pressure 119/73, pulse 80, temperature 97.7 °F (36.5 °C), height 5' 7" (1.702 m), weight 72.6 kg (160 lb).                                            Subjective  Objective:  Vital signs (most recent): Blood pressure 119/73, pulse 80, temperature 97.7 °F (36.5 °C), height 5' 7" (1.702 m), weight 72.6 kg (160 lb).     Assessment & Plan             Patient presents today the surgical site where she had her Achilles tendon repair is doing very well the patient has full range of motion she has a little bit of scar tissue at the edges of the incision but these are minimal and this is not causing her any limitation.  Patient's complaint today was heel pain left I did advised the patient she has findings consistent with plantar fasciitis I have given her some heel lifts their soft heel cushions for her to use in both shoes this should eliminate the pain she is having in the left heel patient was in understanding and agreement with this.  Plan follow-up will be as needed.  Patient advised if she has any problems questions or concerns are does this not completely resolved over the next 2-3 weeks to contact us for follow-up further evaluation and treatment.  Face-to-face time equaled 15 min.  This note was created using [employer] voice recognition software that occasionally misinterpreted phrases or words.  Teofilo Plaza DPM  12/5/2020                  "

## 2021-02-26 ENCOUNTER — TELEPHONE (OUTPATIENT)
Dept: SURGERY | Facility: CLINIC | Age: 71
End: 2021-02-26

## 2021-03-25 DIAGNOSIS — R42 DIZZINESS AND GIDDINESS: Primary | ICD-10-CM

## 2021-03-26 ENCOUNTER — HOSPITAL ENCOUNTER (OUTPATIENT)
Dept: RADIOLOGY | Facility: HOSPITAL | Age: 71
Discharge: HOME | End: 2021-03-26
Attending: OTOLARYNGOLOGY
Payer: MEDICARE

## 2021-03-26 DIAGNOSIS — R42 DIZZINESS AND GIDDINESS: ICD-10-CM

## 2021-03-26 PROCEDURE — 70480 CT TEMPORAL BONE WITHOUT CONTRAST: ICD-10-PCS | Mod: 26,,, | Performed by: RADIOLOGY

## 2021-03-26 PROCEDURE — 70480 CT ORBIT/EAR/FOSSA W/O DYE: CPT | Mod: 26,,, | Performed by: RADIOLOGY

## 2021-03-26 PROCEDURE — 70480 CT ORBIT/EAR/FOSSA W/O DYE: CPT | Mod: TC

## 2021-07-09 ENCOUNTER — HOSPITAL ENCOUNTER (OUTPATIENT)
Facility: HOSPITAL | Age: 71
Discharge: HOME | End: 2021-07-09
Attending: EMERGENCY MEDICINE | Admitting: FAMILY MEDICINE
Payer: MEDICARE

## 2021-07-09 VITALS
SYSTOLIC BLOOD PRESSURE: 122 MMHG | DIASTOLIC BLOOD PRESSURE: 85 MMHG | TEMPERATURE: 97 F | RESPIRATION RATE: 18 BRPM | BODY MASS INDEX: 26.68 KG/M2 | HEIGHT: 67 IN | HEART RATE: 67 BPM | WEIGHT: 170 LBS | OXYGEN SATURATION: 100 %

## 2021-07-09 DIAGNOSIS — E78.5 HYPERLIPIDEMIA, UNSPECIFIED HYPERLIPIDEMIA TYPE: ICD-10-CM

## 2021-07-09 DIAGNOSIS — Z87.19 HISTORY OF GASTROESOPHAGEAL REFLUX (GERD): Primary | ICD-10-CM

## 2021-07-09 DIAGNOSIS — E03.9 HYPOTHYROIDISM, UNSPECIFIED TYPE: ICD-10-CM

## 2021-07-09 DIAGNOSIS — R07.9 CHEST PAIN: ICD-10-CM

## 2021-07-09 LAB
ALBUMIN SERPL BCP-MCNC: 4.1 G/DL (ref 3.5–5.2)
ALP SERPL-CCNC: 57 U/L (ref 55–135)
ALT SERPL W/O P-5'-P-CCNC: 18 U/L (ref 10–44)
ANION GAP SERPL CALC-SCNC: 10 MMOL/L (ref 8–16)
AST SERPL-CCNC: 24 U/L (ref 10–40)
BASOPHILS # BLD AUTO: 0.03 K/UL (ref 0–0.2)
BASOPHILS NFR BLD: 0.7 % (ref 0–1.9)
BILIRUB SERPL-MCNC: 0.4 MG/DL (ref 0.1–1)
BNP SERPL-MCNC: 13 PG/ML (ref 0–99)
BUN SERPL-MCNC: 17 MG/DL (ref 8–23)
CALCIUM SERPL-MCNC: 8.8 MG/DL (ref 8.7–10.5)
CHLORIDE SERPL-SCNC: 104 MMOL/L (ref 95–110)
CO2 SERPL-SCNC: 23 MMOL/L (ref 23–29)
CREAT SERPL-MCNC: 0.9 MG/DL (ref 0.5–1.4)
DIFFERENTIAL METHOD: ABNORMAL
EOSINOPHIL # BLD AUTO: 0.1 K/UL (ref 0–0.5)
EOSINOPHIL NFR BLD: 2.1 % (ref 0–8)
ERYTHROCYTE [DISTWIDTH] IN BLOOD BY AUTOMATED COUNT: 13.1 % (ref 11.5–14.5)
EST. GFR  (AFRICAN AMERICAN): >60 ML/MIN/1.73 M^2
EST. GFR  (NON AFRICAN AMERICAN): >60 ML/MIN/1.73 M^2
GLUCOSE SERPL-MCNC: 85 MG/DL (ref 70–110)
HCT VFR BLD AUTO: 35.7 % (ref 37–48.5)
HGB BLD-MCNC: 12.2 G/DL (ref 12–16)
IMM GRANULOCYTES # BLD AUTO: 0.01 K/UL (ref 0–0.04)
IMM GRANULOCYTES NFR BLD AUTO: 0.2 % (ref 0–0.5)
LYMPHOCYTES # BLD AUTO: 2.1 K/UL (ref 1–4.8)
LYMPHOCYTES NFR BLD: 49.2 % (ref 18–48)
MCH RBC QN AUTO: 32.9 PG (ref 27–31)
MCHC RBC AUTO-ENTMCNC: 34.2 G/DL (ref 32–36)
MCV RBC AUTO: 96 FL (ref 82–98)
MONOCYTES # BLD AUTO: 0.4 K/UL (ref 0.3–1)
MONOCYTES NFR BLD: 8.6 % (ref 4–15)
NEUTROPHILS # BLD AUTO: 1.7 K/UL (ref 1.8–7.7)
NEUTROPHILS NFR BLD: 39.2 % (ref 38–73)
NRBC BLD-RTO: 0 /100 WBC
PLATELET # BLD AUTO: 195 K/UL (ref 150–450)
PMV BLD AUTO: 9.9 FL (ref 9.2–12.9)
POTASSIUM SERPL-SCNC: 3.7 MMOL/L (ref 3.5–5.1)
PROT SERPL-MCNC: 7.2 G/DL (ref 6–8.4)
RBC # BLD AUTO: 3.71 M/UL (ref 4–5.4)
SARS-COV-2 RDRP RESP QL NAA+PROBE: NEGATIVE
SODIUM SERPL-SCNC: 137 MMOL/L (ref 136–145)
T4 FREE SERPL-MCNC: 0.86 NG/DL (ref 0.71–1.51)
TROPONIN I SERPL DL<=0.01 NG/ML-MCNC: <0.006 NG/ML (ref 0–0.03)
TSH SERPL DL<=0.005 MIU/L-ACNC: 15.71 UIU/ML (ref 0.4–4)
WBC # BLD AUTO: 4.29 K/UL (ref 3.9–12.7)

## 2021-07-09 PROCEDURE — 99285 EMERGENCY DEPT VISIT HI MDM: CPT | Mod: 25

## 2021-07-09 PROCEDURE — 80053 COMPREHEN METABOLIC PANEL: CPT | Performed by: EMERGENCY MEDICINE

## 2021-07-09 PROCEDURE — U0002 COVID-19 LAB TEST NON-CDC: HCPCS | Performed by: EMERGENCY MEDICINE

## 2021-07-09 PROCEDURE — 71045 XR CHEST AP PORTABLE: ICD-10-PCS | Mod: 26,,, | Performed by: RADIOLOGY

## 2021-07-09 PROCEDURE — G0378 HOSPITAL OBSERVATION PER HR: HCPCS | Mod: CS

## 2021-07-09 PROCEDURE — 71045 X-RAY EXAM CHEST 1 VIEW: CPT | Mod: TC,FY

## 2021-07-09 PROCEDURE — 36415 COLL VENOUS BLD VENIPUNCTURE: CPT | Performed by: EMERGENCY MEDICINE

## 2021-07-09 PROCEDURE — 84484 ASSAY OF TROPONIN QUANT: CPT | Mod: 91 | Performed by: EMERGENCY MEDICINE

## 2021-07-09 PROCEDURE — 25000003 PHARM REV CODE 250: Performed by: FAMILY MEDICINE

## 2021-07-09 PROCEDURE — 25000003 PHARM REV CODE 250: Performed by: EMERGENCY MEDICINE

## 2021-07-09 PROCEDURE — 84484 ASSAY OF TROPONIN QUANT: CPT | Mod: 91 | Performed by: FAMILY MEDICINE

## 2021-07-09 PROCEDURE — 84443 ASSAY THYROID STIM HORMONE: CPT | Performed by: EMERGENCY MEDICINE

## 2021-07-09 PROCEDURE — 85025 COMPLETE CBC W/AUTO DIFF WBC: CPT | Performed by: EMERGENCY MEDICINE

## 2021-07-09 PROCEDURE — 93005 ELECTROCARDIOGRAM TRACING: CPT

## 2021-07-09 PROCEDURE — 71045 X-RAY EXAM CHEST 1 VIEW: CPT | Mod: 26,,, | Performed by: RADIOLOGY

## 2021-07-09 PROCEDURE — G0378 HOSPITAL OBSERVATION PER HR: HCPCS

## 2021-07-09 PROCEDURE — 84439 ASSAY OF FREE THYROXINE: CPT | Performed by: EMERGENCY MEDICINE

## 2021-07-09 PROCEDURE — 83880 ASSAY OF NATRIURETIC PEPTIDE: CPT | Performed by: EMERGENCY MEDICINE

## 2021-07-09 PROCEDURE — 36415 COLL VENOUS BLD VENIPUNCTURE: CPT | Performed by: FAMILY MEDICINE

## 2021-07-09 RX ORDER — PANTOPRAZOLE SODIUM 40 MG/1
40 TABLET, DELAYED RELEASE ORAL DAILY
Status: DISCONTINUED | OUTPATIENT
Start: 2021-07-09 | End: 2021-07-09 | Stop reason: HOSPADM

## 2021-07-09 RX ORDER — PANTOPRAZOLE SODIUM 40 MG/1
40 TABLET, DELAYED RELEASE ORAL DAILY
Qty: 30 TABLET | Refills: 11 | Status: SHIPPED | OUTPATIENT
Start: 2021-07-09 | End: 2022-07-09

## 2021-07-09 RX ORDER — KETOROLAC TROMETHAMINE 30 MG/ML
15 INJECTION, SOLUTION INTRAMUSCULAR; INTRAVENOUS EVERY 6 HOURS PRN
Status: DISCONTINUED | OUTPATIENT
Start: 2021-07-09 | End: 2021-07-09 | Stop reason: HOSPADM

## 2021-07-09 RX ORDER — LEVOTHYROXINE SODIUM 112 UG/1
112 TABLET ORAL DAILY
Qty: 30 TABLET | Refills: 1 | Status: SHIPPED | OUTPATIENT
Start: 2021-07-09

## 2021-07-09 RX ORDER — CETIRIZINE HYDROCHLORIDE 10 MG/1
10 TABLET ORAL DAILY
Status: DISCONTINUED | OUTPATIENT
Start: 2021-07-09 | End: 2021-07-09 | Stop reason: HOSPADM

## 2021-07-09 RX ORDER — SODIUM CHLORIDE 0.9 % (FLUSH) 0.9 %
10 SYRINGE (ML) INJECTION
Status: DISCONTINUED | OUTPATIENT
Start: 2021-07-09 | End: 2021-07-09 | Stop reason: HOSPADM

## 2021-07-09 RX ORDER — ASPIRIN 325 MG
325 TABLET ORAL
Status: COMPLETED | OUTPATIENT
Start: 2021-07-09 | End: 2021-07-09

## 2021-07-09 RX ORDER — LEVOTHYROXINE SODIUM 25 UG/1
100 TABLET ORAL DAILY
Status: DISCONTINUED | OUTPATIENT
Start: 2021-07-09 | End: 2021-07-09 | Stop reason: HOSPADM

## 2021-07-09 RX ORDER — LANOLIN ALCOHOL/MO/W.PET/CERES
400 CREAM (GRAM) TOPICAL DAILY
Status: DISCONTINUED | OUTPATIENT
Start: 2021-07-09 | End: 2021-07-09 | Stop reason: HOSPADM

## 2021-07-09 RX ORDER — ESTRADIOL 0.5 MG/1
0.5 TABLET ORAL DAILY
Status: DISCONTINUED | OUTPATIENT
Start: 2021-07-09 | End: 2021-07-09 | Stop reason: HOSPADM

## 2021-07-09 RX ORDER — ACETAMINOPHEN 325 MG/1
650 TABLET ORAL EVERY 4 HOURS PRN
Status: DISCONTINUED | OUTPATIENT
Start: 2021-07-09 | End: 2021-07-09 | Stop reason: HOSPADM

## 2021-07-09 RX ORDER — GABAPENTIN 300 MG/1
300 CAPSULE ORAL DAILY
Status: DISCONTINUED | OUTPATIENT
Start: 2021-07-09 | End: 2021-07-09 | Stop reason: HOSPADM

## 2021-07-09 RX ORDER — CALCIUM CARBONATE 200(500)MG
500 TABLET,CHEWABLE ORAL 3 TIMES DAILY PRN
Status: DISCONTINUED | OUTPATIENT
Start: 2021-07-09 | End: 2021-07-09 | Stop reason: HOSPADM

## 2021-07-09 RX ADMIN — LEVOTHYROXINE SODIUM 100 MCG: 0.03 TABLET ORAL at 11:07

## 2021-07-09 RX ADMIN — LIDOCAINE HYDROCHLORIDE: 20 SOLUTION ORAL; TOPICAL at 08:07

## 2021-07-09 RX ADMIN — PANTOPRAZOLE SODIUM 40 MG: 40 TABLET, DELAYED RELEASE ORAL at 11:07

## 2021-07-09 RX ADMIN — GABAPENTIN 300 MG: 300 CAPSULE ORAL at 11:07

## 2021-07-09 RX ADMIN — ACETAMINOPHEN 650 MG: 325 TABLET ORAL at 02:07

## 2021-07-09 RX ADMIN — ASPIRIN 325 MG ORAL TABLET 325 MG: 325 PILL ORAL at 07:07

## 2021-07-09 RX ADMIN — Medication 400 MG: at 11:07

## 2021-07-09 RX ADMIN — CETIRIZINE HYDROCHLORIDE 10 MG: 10 TABLET, FILM COATED ORAL at 11:07

## 2021-07-26 DIAGNOSIS — M54.50 LOW BACK PAIN: ICD-10-CM

## 2021-07-26 DIAGNOSIS — M54.2 CERVICALGIA: Primary | ICD-10-CM

## 2021-08-02 ENCOUNTER — CLINICAL SUPPORT (OUTPATIENT)
Dept: REHABILITATION | Facility: HOSPITAL | Age: 71
End: 2021-08-02
Payer: MEDICARE

## 2021-08-02 DIAGNOSIS — M54.50 LOW BACK PAIN: ICD-10-CM

## 2021-08-02 DIAGNOSIS — M54.2 CERVICALGIA: ICD-10-CM

## 2021-08-02 PROCEDURE — 97161 PT EVAL LOW COMPLEX 20 MIN: CPT | Mod: PN

## 2021-08-04 ENCOUNTER — CLINICAL SUPPORT (OUTPATIENT)
Dept: REHABILITATION | Facility: HOSPITAL | Age: 71
End: 2021-08-04
Payer: MEDICARE

## 2021-08-04 DIAGNOSIS — M54.50 LUMBAR PAIN: Primary | ICD-10-CM

## 2021-08-04 PROCEDURE — 97110 THERAPEUTIC EXERCISES: CPT | Mod: PN,CQ

## 2021-08-09 ENCOUNTER — CLINICAL SUPPORT (OUTPATIENT)
Dept: REHABILITATION | Facility: HOSPITAL | Age: 71
End: 2021-08-09
Payer: MEDICARE

## 2021-08-09 DIAGNOSIS — M54.50 LUMBAR PAIN: Primary | ICD-10-CM

## 2021-08-09 PROCEDURE — 97110 THERAPEUTIC EXERCISES: CPT | Mod: PN,CQ

## 2021-08-11 ENCOUNTER — CLINICAL SUPPORT (OUTPATIENT)
Dept: REHABILITATION | Facility: HOSPITAL | Age: 71
End: 2021-08-11
Payer: MEDICARE

## 2021-08-11 DIAGNOSIS — M54.50 LUMBAR PAIN: Primary | ICD-10-CM

## 2021-08-11 PROCEDURE — 97110 THERAPEUTIC EXERCISES: CPT | Mod: PN

## 2021-08-18 ENCOUNTER — CLINICAL SUPPORT (OUTPATIENT)
Dept: REHABILITATION | Facility: HOSPITAL | Age: 71
End: 2021-08-18
Payer: MEDICARE

## 2021-08-18 DIAGNOSIS — M54.50 LUMBAR PAIN: Primary | ICD-10-CM

## 2021-08-18 PROCEDURE — 97110 THERAPEUTIC EXERCISES: CPT | Mod: PN,CQ

## 2021-08-20 ENCOUNTER — CLINICAL SUPPORT (OUTPATIENT)
Dept: REHABILITATION | Facility: HOSPITAL | Age: 71
End: 2021-08-20
Payer: MEDICARE

## 2021-08-20 DIAGNOSIS — M54.50 LUMBAR PAIN: Primary | ICD-10-CM

## 2021-08-20 DIAGNOSIS — M54.2 NECK PAIN: ICD-10-CM

## 2021-08-20 PROCEDURE — 97110 THERAPEUTIC EXERCISES: CPT | Mod: PN

## 2022-07-22 NOTE — TELEPHONE ENCOUNTER
----- Message from Patricia Mayo sent at 9/24/2020  2:41 PM CDT -----  Regarding: Results  Contact: pt  Type:  Test Results    Who Called: patient#  Name of Test (Lab/Mammo/Etc):  u/s  Date of Test:  9/22/20  Ordering Provider:  Dr Pedraza   Where the test was performed:  [facility]   Best Call Back Number:  [phone]  Additional Information:  Patient is asking for a call back to get her test results          [facility] MEDICINE PROGRESS NOTE   Patient: Zach Aden  Today's Date: 7/22/2022    YOB: 1943  Admission Date: 7/16/2022    MRN: [id number]  Inpatient LOS: 6    Room: 160/01  Hospital Day: Hospital Day: 7    Subjective   HISTORY AND SUBJECTIVE COMPLAINTS     Interval History / Subjective:   Denies acute events over night. Dysphagia and odynophagia improved. SLP saw patient again today and advanced diet. He refused EGD this morning. Thought symptoms are improving and does not wish to proceed with EGD for further evaluation. Denies CP, SOB, cough, nausea, vomiting, abdominal pain, dysuria, melena, hematochezia. Denies new complaints. Tolerating diet well.     Hospital Course:  Zach Aden is a 79 year old male who presented on 7/16/2022 with complaints of Sore Throat and Mouth/Lip Problem  .    ROS:  Pertinent systems negative except as above.    Objective   PHYSICAL EXAMINATION     Vital 24 Hour Range Most Recent Value   Temperature Temp  Min: 97.4 °F (36.3 °C)  Max: 98.2 °F (36.8 °C) 98.2 °F (36.8 °C)   Pulse Pulse  Min: 74  Max: 93 74   Respiratory Resp  Min: 18  Max: 20 18   Blood Pressure BP  Min: 114/56  Max: 135/63 135/63   Pulse Oximetry SpO2  Min: 93 %  Max: 100 % 94 %   Arterial BP No data recorded     O2 No data recorded       Recorded Intake and Output:    Intake/Output Summary (Last 24 hours) at 7/22/2022 1131  Last data filed at 7/22/2022 0907  Gross per 24 hour   Intake 2062.27 ml   Output 1350 ml   Net 712.27 ml      Recorded Last Stool Occurrence: 2 (07/20/22 1850)     Vital Most Recent Value First Value   Weight 72.5 kg (159 lb 13.3 oz) Weight: 71 kg (156 lb 8.4 oz)   Height 5' 11\" (180.3 cm) Height: 5' 11\" (180.3 cm)   BMI 22.29 N/A     General: Looks well well developed, well nourished and no apparent distress  CV: regular rate and rhythm and no murmurs, rubs, or thrills  Resp: clear to auscultation bilaterally and no accessory muscle use   Abd: soft,  nontender, nondistended and no hepatosplenomegaly  Ext: no clubbing, cyanosis, or ischemia  Skin: no rashes, lesions, or ulcers noted  Neuro: normal sensation  and no focal deficits noted  Psych: normal judgement and insight, oriented to time, place and person and normal mood and affect    TEST RESULTS     Labs: The Laboratory values listed below have been reviewed and pertinent findings discussed in the Assessment and Plan.    Laboratory values:     Recent Labs   Lab 07/22/22  0522 07/21/22  1552 07/21/22  0541 07/20/22  1753 07/20/22  0603 07/18/22  1208 07/18/22  0340 07/17/22  1106 07/17/22  0339   SODIUM 143  --  144  --  148*   < > 148*   < > 148*   POTASSIUM 3.1* 3.5 2.6*   < > 2.7*   < > 2.7*   < > 3.2*   CHLORIDE 107  --  109  --  114*   < > 113*   < > 109   CO2 30  --  30  --  29   < > 27   < > 31   CALCIUM 7.7*  --  7.9*  --  8.1*   < > 8.3*   < > 8.6   GLUCOSE 105*  --  111*  --  108*   < > 127*   < > 149*   BUN 13  --  14  --  20   < > 28*   < > 27*   CREATININE 0.77  --  0.82  --  0.88   < > 1.03   < > 0.86   MG  --   --   --   --   --   --  1.8  --  1.7    < > = values in this interval not displayed.          Radiology: Imaging studies have been reviewed and pertinent findings discussed in the Assessment and Plan.  Results for orders placed or performed during the hospital encounter of 07/16/22 (from the past 48 hour(s))   FL Video Swallow Study    Impression    IMPRESSION: No witnessed laryngeal penetration or aspiration. A formal  report will be issued by the department of speech pathology        ANCILLARY ORDERS     Diet:  3 Times/day W Meals; Ensure Enlive/standard Oral Supplement (thickened Per Orders, Vary Flavors) Oral Nutrition Supplement  Dysphagia 2/ground/minced; See Swallow Guidelines. Diet  Nursing To Pass Tray - Periodic Supervision  Telemetry: Off  Consults:    IP CONSULT TO ONCOLOGY  PHARMACY TO DOSE AND MONITOR VANCOMYCIN  IP CONSULT TO INFECTIOUS DISEASES  IP CONSULT TO NUTRITION  SERVICES  IP CONSULT TO NUTRITIONAL SERVICES - TUBE FEEDING  IP CONSULT TO GI  Therapy Orders:   No orders of the defined types were placed in this encounter.      ADVANCED DIRECTIVES     Code Status: Selective Treatment/DNR     ASSESSMENT AND PLAN     Neutropenic fever - resolved   Blood cultures NGTD  UA- urine culture mixed  XR- no acute findings  CT neck-No inflammation, cervical adenopathy, or fluid collection in the neck to suggest abscess. Right upper lobe lung mass, consistent with known carcinoma, and partially visualized mediastinal adenopathy, all show considerable decrease in size/volume compared with prior PET/CT from 5/26/2022.  CT chest/abd/pelv- no cause found for fever  DC vanc and zosyn 7/19  ID consulted- DC abx     Odynophagia; Dysphagia; Likely 2/2 to radiation esophagitis  2/2 to radiation esophagitis.  SLP consulted. Diet advanced today.   CT neck- no acuity. No abscess  Suction as needed  Concern for radiation induced dysphagia/odynophagia  PPI BID and sucralfate for now  S/P video swallow to be done 7/20.   GI consulted- Originally planned EGD. Patient now refused since he has felt better.   RD consulted for malnutrition.      Hypokalemia  Replaced  Trend     Severe protein calorie malnutrition  Dietary consulted  coretrak placed 7/18- dislodged PM of 7/18  Dietary following  May need to consider other forms of nutrition. Case discussed with RD today. No need for PEG placement during this hospitalization. May consider as OP pending clinical course.      SCLC  Receiving chemo and radiation  Follows Dr. Dunne  Oncology consulted     Pancytopenia               Due to bone marrow suppression from chemotherapy. Initially treated with G-CSF due to neutropenic fever. S/P Neupogen. Now on hold for neupogen given improvement. Continue daily iron supplement and folic acid. Prior iron               studies with low iron so could consider IV iron once cleared from the infection standpoint.      HTN  Continue home amlodipine and metoprolol.      CAD  Continue ASA. Will resume Plavix given patient refused EGD now.      S/p R CEA  Continue DAPT with ASA and Plavix.      HLD  Resume lipitor     Nutrition status: appropriate  Body mass index is 22.29 kg/m². - appropriate weight BMI 18.5-24  DVT Prophylaxis: Lovenox prophylactic dosing (dose adjusted as per renal function), SCDs     DISCHARGE PLANNING     The patient's treatment plans were discussed with patient and RN.     Recommendations for Discharge   SW     PT     OT     SLP        Anticipated discharge destination: Home  Expected Discharge Date: Likely within next 24 hours.   Barriers to Discharge: Patient is not medically ready and needs to remain in the hospital today due to current medical conditions    Jane Ochoa DNP, MARLA  [facility]  7/22/2022  11:31 AM        -------------------------------------------------------------------------------------  The patient's symptoms, physical findings, studies, assessment and plan were done in conjunction with the Advanced practice Clinician (APC). Please refer to the APC note dated, 7/22/2022. I have examined this patient, reviewed all pertinent lab and radiology data, and determined the findings detailed above. I agree with the assessment and plan as detailed above, with additional discussion below.    Vital Signs:   Vitals:    07/22/22 1535   BP: 108/59   Pulse: 80   Resp: 18   Temp: 98.1 °F (36.7 °C)         Physical Exam:     General: NAD, cachectic   Skin: Warm and dry, exfoliation of skin around neck, No icterus.  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

## 2022-09-22 DIAGNOSIS — M54.9 DORSALGIA: Primary | ICD-10-CM

## 2022-10-18 ENCOUNTER — CLINICAL SUPPORT (OUTPATIENT)
Dept: REHABILITATION | Facility: HOSPITAL | Age: 72
End: 2022-10-18
Payer: MEDICARE

## 2022-10-18 DIAGNOSIS — Z74.09 DECREASED FUNCTIONAL MOBILITY AND ENDURANCE: ICD-10-CM

## 2022-10-18 DIAGNOSIS — M54.9 DORSALGIA: ICD-10-CM

## 2022-10-18 PROCEDURE — 97162 PT EVAL MOD COMPLEX 30 MIN: CPT | Mod: PN

## 2022-10-18 NOTE — PLAN OF CARE
PT met face to face with Jonathan Favre, PTA to discuss patient's treatment plan and progress towards established goals.  Treatment will be continued as described in initial report/eval and progress notes.  Patient will be seen by physical therapist every sixth visit and minimally once per month.    Additional information: patient has Alzheimer's;   has difficulty communicating specifics of her back pain, just knows that her back mm hurt.  Will do well with general exercise program; STM to lumbar paraspinals.
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
## 2022-10-21 ENCOUNTER — CLINICAL SUPPORT (OUTPATIENT)
Dept: REHABILITATION | Facility: HOSPITAL | Age: 72
End: 2022-10-21
Payer: MEDICARE

## 2022-10-21 DIAGNOSIS — Z74.09 DECREASED FUNCTIONAL MOBILITY AND ENDURANCE: Primary | ICD-10-CM

## 2022-10-21 PROCEDURE — 97110 THERAPEUTIC EXERCISES: CPT | Mod: PN,CQ

## 2022-10-21 NOTE — PROGRESS NOTES
[facility]   Physical Therapy Treatment Note     Name: Kathi Baker Douglass  Clinic Number: [id number]    Therapy Diagnosis:   Encounter Diagnosis   Name Primary?    Decreased functional mobility and endurance Yes     Physician: Clare Pan NP    Visit Date: 10/21/2022    Physician Orders: PT Eval and Treat   Medical Diagnosis from Referral: dorsalgia   Evaluation Date: 10/18/2022  Authorization Period Expiration: 10/18/2023  Plan of Care Expiration: 01/18/2023  Visit # / Visits authorized: 2 / 12  FOTO Visit #:  1/3    PTA Visit #: 1/5     Time In: 2:45 PM  Time Out: 3:30 PM  Total Billable Time: 40 minutes    SUBJECTIVE     Pt reports: No new c/o's..  She was not compliant with home exercise program as one has not been issued yet.  Response to previous treatment: N/A   Functional change: N/A    Pain: 3/10  Location: bilateral back      OBJECTIVE     Objective Measures updated at progress report unless specified.     Treatment     Kathi received the treatments listed below:      therapeutic exercises to develop strength, endurance, and flexibility for 40 minutes including:  Recumbent Bike level 4 x 12 min  Seated H/S stretches 3 x 30 sec  Pelvic Tilts x 15  Bridges x 10  Ball Squeezes x 2 min  SLR x 10  DKC w/ PB x 2 min  LTR w/ PB x 2 min  Supine Knee fallouts x 15  Supine Clams x 15    manual therapy techniques:  were applied to the:  for  minutes, including:      neuromuscular re-education activities to improve:  for  minutes. The following activities were included:      therapeutic activities to improve functional performance for   minutes, including:      gait training to improve functional mobility and safety for   minutes, including:      direct contact modalities after being cleared for contraindications:     supervised modalities after being cleared for contradictions:     hot pack for  minutes to .    cold pack for  minutes to .        Patient Education and Home Exercises      Home Exercises Provided and Patient Education Provided     Education provided:       Written Home Exercises Provided: Exercises were reviewed and Kathi was able to demonstrate them prior to the end of the session.  Kathi demonstrated good  understanding of the education provided. See EMR under Patient Instructions for exercises provided during therapy sessions    ASSESSMENT     Patient did well with exercises; no increased symptoms noted; VC's for technique.     Kathi Is progressing well towards her goals.   Pt prognosis is Fair.     Pt will continue to benefit from skilled outpatient physical therapy to address the deficits listed in the problem list box on initial evaluation, provide pt/family education and to maximize pt's level of independence in the home and community environment.     Pt's spiritual, cultural and educational needs considered and pt agreeable to plan of care and goals.     Anticipated barriers to physical therapy: None    Goals:   Short Term Goals: 3 weeks   Able to communicate reduction in lower back pain with performance of daily activities   Able to demonstrate improved lumbar AROM without any increase in back pain   Compliant with HEP      Long Term Goals: 6 weeks   Patient able to report 0/10 pain in her lower back with daily activities   Able to walk x 30 mins with  without reported pain   FOTO score limitation improved to 15% or less   Independent with HEP for continued improvement in function     PLAN     Continue per POC and progress with strengthening/mobility exercises as patient tolerates.     Jonathan Favre, PTA

## 2022-10-26 ENCOUNTER — CLINICAL SUPPORT (OUTPATIENT)
Dept: REHABILITATION | Facility: HOSPITAL | Age: 72
End: 2022-10-26
Payer: MEDICARE

## 2022-10-26 DIAGNOSIS — Z74.09 DECREASED FUNCTIONAL MOBILITY AND ENDURANCE: Primary | ICD-10-CM

## 2022-10-26 PROCEDURE — 97110 THERAPEUTIC EXERCISES: CPT | Mod: PN,CQ

## 2022-10-26 NOTE — PROGRESS NOTES
[facility]   Physical Therapy Treatment Note     Name: Kathi Baker [place]  Clinic Number: [id number]    Therapy Diagnosis:   Encounter Diagnosis   Name Primary?    Decreased functional mobility and endurance Yes     Physician: Clare Pan NP    Visit Date: 10/26/2022    Physician Orders: PT Eval and Treat   Medical Diagnosis from Referral: dorsalgia   Evaluation Date: 10/18/2022  Authorization Period Expiration: 10/18/2023  Plan of Care Expiration: 01/18/2023  Visit # / Visits authorized: 3 / 12  FOTO Visit #:  1/3    PTA Visit #: 2/5     Time In: 1:15 PM  Time Out: 2:00 PM  Total Billable Time: 40 minutes    SUBJECTIVE     Pt reports: No new c/o's..  She was not compliant with home exercise program as one has not been issued yet.  Response to previous treatment: N/A   Functional change: N/A    Pain: 3/10  Location: bilateral back      OBJECTIVE     Objective Measures updated at progress report unless specified.     Treatment     Kathi received the treatments listed below:      therapeutic exercises to develop strength, endurance, and flexibility for 40 minutes including:  Recumbent Bike level 4 x 12 min  Seated Lumbar flex w/ PB x 2 min  Seated H/S stretches 3 x 30 sec  Pelvic Tilts x 15  Bridges x 10  Ball Squeezes x 2 min  SLR x 10  DKC w/ PB x 2 min  LTR w/ PB x 2 min  Supine Knee fallouts x 15  Supine Clams x 15    manual therapy techniques:  were applied to the:  for  minutes, including:      neuromuscular re-education activities to improve:  for  minutes. The following activities were included:      therapeutic activities to improve functional performance for   minutes, including:      gait training to improve functional mobility and safety for   minutes, including:      direct contact modalities after being cleared for contraindications:     supervised modalities after being cleared for contradictions:     hot pack for  minutes to .    cold pack for  minutes to .        Patient  Education and Home Exercises     Home Exercises Provided and Patient Education Provided     Education provided:       Written Home Exercises Provided: Exercises were reviewed and Kathi was able to demonstrate them prior to the end of the session.  Kathi demonstrated good  understanding of the education provided. See EMR under Patient Instructions for exercises provided during therapy sessions    ASSESSMENT     Patient did well with exercises; no increased symptoms noted; VC's for technique.     Kathi Is progressing well towards her goals.   Pt prognosis is Fair.     Pt will continue to benefit from skilled outpatient physical therapy to address the deficits listed in the problem list box on initial evaluation, provide pt/family education and to maximize pt's level of independence in the home and community environment.     Pt's spiritual, cultural and educational needs considered and pt agreeable to plan of care and goals.     Anticipated barriers to physical therapy: None    Goals:   Short Term Goals: 3 weeks   Able to communicate reduction in lower back pain with performance of daily activities   Able to demonstrate improved lumbar AROM without any increase in back pain   Compliant with HEP      Long Term Goals: 6 weeks   Patient able to report 0/10 pain in her lower back with daily activities   Able to walk x 30 mins with  without reported pain   FOTO score limitation improved to 15% or less   Independent with HEP for continued improvement in function     PLAN     Continue per POC and progress with strengthening/mobility exercises as patient tolerates.     Jonathan Favre, PTA

## 2022-10-28 ENCOUNTER — CLINICAL SUPPORT (OUTPATIENT)
Dept: REHABILITATION | Facility: HOSPITAL | Age: 72
End: 2022-10-28
Payer: MEDICARE

## 2022-10-28 DIAGNOSIS — Z74.09 DECREASED FUNCTIONAL MOBILITY AND ENDURANCE: Primary | ICD-10-CM

## 2022-10-28 PROCEDURE — 97110 THERAPEUTIC EXERCISES: CPT | Mod: PN,CQ

## 2022-10-28 NOTE — PROGRESS NOTES
[facility]   Physical Therapy Treatment Note     Name: Kathi Baker [place]  Clinic Number: [id number]    Therapy Diagnosis:   Encounter Diagnosis   Name Primary?    Decreased functional mobility and endurance Yes     Physician: Clare Pan NP    Visit Date: 10/28/2022    Physician Orders: PT Eval and Treat   Medical Diagnosis from Referral: dorsalgia   Evaluation Date: 10/18/2022  Authorization Period Expiration: 10/18/2023  Plan of Care Expiration: 01/18/2023  Visit # / Visits authorized: 4 / 12  FOTO Visit #:  1/3    PTA Visit #: 3/5     Time In: 1:50 PM  Time Out: 2:40 PM  Total Billable Time: 40 minutes    SUBJECTIVE     Pt reports: No new c/o's..  She was not compliant with home exercise program as one has not been issued yet.  Response to previous treatment: N/A   Functional change: N/A    Pain: 3/10  Location: bilateral back      OBJECTIVE     Objective Measures updated at progress report unless specified.     Treatment     Kathi received the treatments listed below:      therapeutic exercises to develop strength, endurance, and flexibility for 40 minutes including:  Recumbent Bike level 4 x 12 min  Seated Lumbar flex w/ PB x 2 min  Seated H/S stretches 3 x 30 sec  Pelvic Tilts x 15  Bridges x 10  Ball Squeezes x 2 min  SLR x 10  DKC w/ PB x 2 min  LTR w/ PB x 2 min  Supine Knee fallouts x 15  Supine Clams x 15    manual therapy techniques:  were applied to the:  for  minutes, including:      neuromuscular re-education activities to improve:  for  minutes. The following activities were included:      therapeutic activities to improve functional performance for   minutes, including:      gait training to improve functional mobility and safety for   minutes, including:      direct contact modalities after being cleared for contraindications:     supervised modalities after being cleared for contradictions:     hot pack for  minutes to .    cold pack for  minutes to .        Patient  Education and Home Exercises     Home Exercises Provided and Patient Education Provided     Education provided:       Written Home Exercises Provided: Exercises were reviewed and Kathi was able to demonstrate them prior to the end of the session.  Kathi demonstrated good  understanding of the education provided. See EMR under Patient Instructions for exercises provided during therapy sessions    ASSESSMENT     Patient did well with exercises; no increased symptoms noted; VC's for technique.     Kathi Is progressing well towards her goals.   Pt prognosis is Fair.     Pt will continue to benefit from skilled outpatient physical therapy to address the deficits listed in the problem list box on initial evaluation, provide pt/family education and to maximize pt's level of independence in the home and community environment.     Pt's spiritual, cultural and educational needs considered and pt agreeable to plan of care and goals.     Anticipated barriers to physical therapy: None    Goals:   Short Term Goals: 3 weeks   Able to communicate reduction in lower back pain with performance of daily activities   Able to demonstrate improved lumbar AROM without any increase in back pain   Compliant with HEP      Long Term Goals: 6 weeks   Patient able to report 0/10 pain in her lower back with daily activities   Able to walk x 30 mins with  without reported pain   FOTO score limitation improved to 15% or less   Independent with HEP for continued improvement in function     PLAN     Continue per POC and progress with strengthening/mobility exercises as patient tolerates.     Jonathan Favre, PTA

## 2022-11-02 ENCOUNTER — CLINICAL SUPPORT (OUTPATIENT)
Dept: REHABILITATION | Facility: HOSPITAL | Age: 72
End: 2022-11-02
Payer: MEDICARE

## 2022-11-02 DIAGNOSIS — M54.50 LUMBAR PAIN: ICD-10-CM

## 2022-11-02 DIAGNOSIS — Z74.09 DECREASED FUNCTIONAL MOBILITY AND ENDURANCE: Primary | ICD-10-CM

## 2022-11-02 PROCEDURE — 97110 THERAPEUTIC EXERCISES: CPT | Mod: PN

## 2022-11-02 NOTE — PROGRESS NOTES
PT/PTA  ( Jonathan Favre) met face to face to discuss pt's treatment plan and progress towards established goals. Pt will be seen by a physical therapist minimally every 6th visit or every 30 days.    Please see Updated Plan of Care dated 11/2/22 for changes and updated goals.    Kinga Goncalves, PT

## 2022-11-02 NOTE — PROGRESS NOTES
PT/PTA  ( Steve Vargas) met face to face to discuss pt's treatment plan and progress towards established goals. Pt will be seen by a physical therapist minimally every 6th visit or every 30 days.    Please see Updated Plan of Care dated 11/2/22 for changes and updated goals.    Kinga Goncalves, PT

## 2022-11-02 NOTE — PROGRESS NOTES
[facility]   Physical Therapy Treatment Note    Name: Kathi Baker [place]  Clinic Number: [id number]    Therapy Diagnosis:   No diagnosis found.    Physician: Clare Pan NP    Visit Date: 11/2/2022    Physician Orders: PT Eval and Treat   Medical Diagnosis from Referral: dorsalgia   Evaluation Date: 10/18/2022  Authorization Period Expiration: 10/18/2023  Plan of Care Expiration: 01/18/2023  Visit # / Visits authorized: 5/ 12  FOTO Visit #:  1/3    PTA Visit #: --/5     Time In: 1:50 PM  Time Out: 2:40 PM  Total Billable Time: 40 minutes    SUBJECTIVE     Pt reports: No new c/o's..  She was not compliant with home exercise program as one has not been issued yet.  Response to previous treatment: N/A   Functional change: N/A    Pain: 3/10  Location: bilateral back      OBJECTIVE     Will provide during PN    Treatment     Kathi received the treatments listed below:      therapeutic exercises to develop strength, endurance, and flexibility for 40 minutes including:  Recumbent Bike level 4 x 10 min  Seated Lumbar flex w/ PB x 2 min  Seated H/S stretches 3 x 30 sec  Pelvic Tilts x 20  Bridges x 15  Ball Squeezes x 3 min  SLR x 10  DKC w/ PB x 2 min  LTR w/ PB x 2 min  Supine Knee fallouts x 15 added YTB  Supine Clams x 15    Patient Education and Home Exercises     Home Exercises Provided and Patient Education Provided     Education provided: Progression in Therapy; Also discussed with the pt's       Written Home Exercises Provided: Exercises were reviewed and Kathi was able to demonstrate them prior to the end of the session.  Kathi demonstrated good  understanding of the education provided. See EMR under Patient Instructions for exercises provided during therapy sessions    ASSESSMENT     Patient did well with exercises; no increased symptoms noted; VC's for technique and to remain on task.  Pt is expected to reach all goals by anticipated DC.    Kathi Is progressing well towards her  goals.   Pt prognosis is Fair.     Pt will continue to benefit from skilled outpatient physical therapy to address the deficits listed in the problem list box on initial evaluation, provide pt/family education and to maximize pt's level of independence in the home and community environment.     Pt's spiritual, cultural and educational needs considered and pt agreeable to plan of care and goals.     Anticipated barriers to physical therapy: None    Goals:   Short Term Goals: 3 weeks   Able to communicate reduction in lower back pain with performance of daily activities MET  Able to demonstrate improved lumbar AROM without any increase in back pain MET  Compliant with HEP progressing     Long Term Goals: 6 weeks   Patient able to report 0/10 pain in her lower back with daily activities PROGRESSING  Able to walk x 30 mins with  without reported pain PROGRESSING  FOTO score limitation improved to 15% or less PROGRESSING  Independent with HEP for continued improvement in function PROGRESSING    PLAN     Continue per POC and progress with strengthening/mobility exercises as patient tolerates. Consider progressing pt to treadmill as appropriate and advance core stabilization activities.     Kinga Goncalves, PT

## 2022-11-04 ENCOUNTER — CLINICAL SUPPORT (OUTPATIENT)
Dept: REHABILITATION | Facility: HOSPITAL | Age: 72
End: 2022-11-04
Payer: MEDICARE

## 2022-11-04 DIAGNOSIS — M54.50 ACUTE MIDLINE LOW BACK PAIN WITHOUT SCIATICA: ICD-10-CM

## 2022-11-04 DIAGNOSIS — Z74.09 DECREASED FUNCTIONAL MOBILITY AND ENDURANCE: Primary | ICD-10-CM

## 2022-11-04 PROCEDURE — 97140 MANUAL THERAPY 1/> REGIONS: CPT | Mod: PN

## 2022-11-04 PROCEDURE — 97110 THERAPEUTIC EXERCISES: CPT | Mod: PN

## 2022-11-09 ENCOUNTER — CLINICAL SUPPORT (OUTPATIENT)
Dept: REHABILITATION | Facility: HOSPITAL | Age: 72
End: 2022-11-09
Payer: MEDICARE

## 2022-11-09 DIAGNOSIS — Z74.09 DECREASED FUNCTIONAL MOBILITY AND ENDURANCE: Primary | ICD-10-CM

## 2022-11-09 DIAGNOSIS — M54.50 ACUTE MIDLINE LOW BACK PAIN WITHOUT SCIATICA: ICD-10-CM

## 2022-11-09 PROCEDURE — 97110 THERAPEUTIC EXERCISES: CPT | Mod: PN,CQ

## 2022-11-09 NOTE — PROGRESS NOTES
[facility]   Physical Therapy Treatment Note    Name: Kathi Baekr [place]  Clinic Number: [id number]    Therapy Diagnosis:   Encounter Diagnoses   Name Primary?    Decreased functional mobility and endurance Yes    Acute midline low back pain without sciatica        Physician: Clare Pan NP    Visit Date: 11/9/2022    Physician Orders: PT Eval and Treat   Medical Diagnosis from Referral: dorsalgia   Evaluation Date: 10/18/2022  Authorization Period Expiration: 10/18/2023  Plan of Care Expiration: 01/18/2023  Visit # / Visits authorized: 6 / 12  FOTO Visit #:  1/3    PTA Visit #: 1/5     Time In: 1:05 PM  Time Out: 2:00 PM  Total Billable Time: 40 minutes    SUBJECTIVE     Pt reports: No new c/o's.  She was not compliant with home exercise program as one has not been issued yet.  Response to previous treatment: N/A   Functional change: N/A    Pain: 3/10  Location: bilateral back      OBJECTIVE     Will provide during PN    Treatment     Kathi received the treatments listed below:      therapeutic exercises to develop strength, endurance, and flexibility for 40 minutes including:  Recumbent Bike level 5 x 15 min  Seated Lumbar flex w/ PB x 2 min  Seated H/S stretches 3 x 30 sec  Pelvic Tilts x 20  Bridges x 15  Ball Squeezes x 2 min  SLR 2 x 10  S/L hip abduction x 15 each   S/L clams x 15 each   DKC w/ PB x 2 min  LTR w/ PB x 2 min   Supine Knee fallouts x 15 added YTB  Prone lumbar extension x 10  Cat/Camel x 6    DNP  Manual tx to thoracic/lumbar spine x 12 mins: grade II to III extension mobilization to T6 through T9; paraspinals skin rolling; S/L flexion to left T-12 and L1/L2       Patient Education and Home Exercises     Home Exercises Provided and Patient Education Provided     Education provided: Progression in Therapy; Also discussed with the pt's       Written Home Exercises Provided: Exercises were reviewed and Kathi was able to demonstrate them prior to the end of the  session.  Kathi demonstrated good  understanding of the education provided. See EMR under Patient Instructions for exercises provided during therapy sessions    ASSESSMENT     Patient did well with exercises; VC's for technique and to remain on task.  Pt is expected to reach all goals by anticipated DC.    Kathi Is progressing well towards her goals.   Pt prognosis is Fair.     Pt will continue to benefit from skilled outpatient physical therapy to address the deficits listed in the problem list box on initial evaluation, provide pt/family education and to maximize pt's level of independence in the home and community environment.     Pt's spiritual, cultural and educational needs considered and pt agreeable to plan of care and goals.     Anticipated barriers to physical therapy: None    Goals:   Short Term Goals: 3 weeks   Able to communicate reduction in lower back pain with performance of daily activities MET  Able to demonstrate improved lumbar AROM without any increase in back pain MET  Compliant with HEP progressing     Long Term Goals: 6 weeks   Patient able to report 0/10 pain in her lower back with daily activities PROGRESSING  Able to walk x 30 mins with  without reported pain PROGRESSING  FOTO score limitation improved to 15% or less PROGRESSING  Independent with HEP for continued improvement in function PROGRESSING    PLAN     Continue per POC and progress with strengthening/mobility exercises as patient tolerates. Consider progressing pt to treadmill as appropriate and advance core stabilization activities.     Jonathan Favre, PTA

## 2022-11-16 ENCOUNTER — CLINICAL SUPPORT (OUTPATIENT)
Dept: REHABILITATION | Facility: HOSPITAL | Age: 72
End: 2022-11-16
Payer: MEDICARE

## 2022-11-16 DIAGNOSIS — M54.50 ACUTE MIDLINE LOW BACK PAIN WITHOUT SCIATICA: ICD-10-CM

## 2022-11-16 DIAGNOSIS — Z74.09 DECREASED FUNCTIONAL MOBILITY AND ENDURANCE: Primary | ICD-10-CM

## 2022-11-16 PROCEDURE — 97110 THERAPEUTIC EXERCISES: CPT | Mod: PN,CQ

## 2022-11-16 NOTE — PROGRESS NOTES
[facility]   Physical Therapy Treatment Note    Name: Kathi Baker [place]  Clinic Number: [id number]    Therapy Diagnosis:   Encounter Diagnoses   Name Primary?    Decreased functional mobility and endurance Yes    Acute midline low back pain without sciatica        Physician: No ref. provider found    Visit Date: 11/16/2022    Physician Orders: PT Eval and Treat   Medical Diagnosis from Referral: dorsalgia   Evaluation Date: 10/18/2022  Authorization Period Expiration: 10/18/2023  Plan of Care Expiration: 01/18/2023  Visit # / Visits authorized: 7 / 12  FOTO Visit #:  1/3    PTA Visit #: 2/5     Time In: 2:45 PM  Time Out: 3:35 PM  Total Billable Time: 40 minutes    SUBJECTIVE     Pt reports: No new c/o's. I am feeling pretty good today.   She was not compliant with home exercise program as one has not been issued yet.  Response to previous treatment: N/A   Functional change: N/A    Pain: 3/10  Location: bilateral back      OBJECTIVE     Will provide during PN    Treatment     Kathi received the treatments listed below:      therapeutic exercises to develop strength, endurance, and flexibility for 40 minutes including:  Recumbent Bike level 5 x 15 min  Seated Lumbar flex w/ PB x 2 min  Seated H/S stretches 3 x 30 sec  Pelvic Tilts x 20  Bridges x 15  Ball Squeezes x 2 min  SLR 2 x 10  S/L hip abduction x 15 each   S/L clams x 15 each   DKC w/ PB x 2 min  LTR w/ PB x 2 min   Supine Knee fallouts x 15 added YTB  Prone lumbar extension x 10  Cat/Camel x 6    DNP  Manual tx to thoracic/lumbar spine x 12 mins: grade II to III extension mobilization to T6 through T9; paraspinals skin rolling; S/L flexion to left T-12 and L1/L2       Patient Education and Home Exercises     Home Exercises Provided and Patient Education Provided     Education provided: Progression in Therapy; Also discussed with the pt's       Written Home Exercises Provided: Exercises were reviewed and Kathi was able to  demonstrate them prior to the end of the session.  Kathi demonstrated good  understanding of the education provided. See EMR under Patient Instructions for exercises provided during therapy sessions    ASSESSMENT     Patient did well with exercises; VC's for technique and to remain on task.  Pt is expected to reach all goals by anticipated DC.    Kathi Is progressing well towards her goals.   Pt prognosis is Fair.     Pt will continue to benefit from skilled outpatient physical therapy to address the deficits listed in the problem list box on initial evaluation, provide pt/family education and to maximize pt's level of independence in the home and community environment.     Pt's spiritual, cultural and educational needs considered and pt agreeable to plan of care and goals.     Anticipated barriers to physical therapy: None    Goals:   Short Term Goals: 3 weeks   Able to communicate reduction in lower back pain with performance of daily activities MET  Able to demonstrate improved lumbar AROM without any increase in back pain MET  Compliant with HEP progressing     Long Term Goals: 6 weeks   Patient able to report 0/10 pain in her lower back with daily activities PROGRESSING  Able to walk x 30 mins with  without reported pain PROGRESSING  FOTO score limitation improved to 15% or less PROGRESSING  Independent with HEP for continued improvement in function PROGRESSING    PLAN     Continue per POC and progress with strengthening/mobility exercises as patient tolerates. Consider progressing pt to treadmill as appropriate and advance core stabilization activities.     Jonathan Favre, PTA

## 2022-11-18 ENCOUNTER — CLINICAL SUPPORT (OUTPATIENT)
Dept: REHABILITATION | Facility: HOSPITAL | Age: 72
End: 2022-11-18
Payer: MEDICARE

## 2022-11-18 DIAGNOSIS — M54.50 ACUTE MIDLINE LOW BACK PAIN WITHOUT SCIATICA: ICD-10-CM

## 2022-11-18 DIAGNOSIS — Z74.09 DECREASED FUNCTIONAL MOBILITY AND ENDURANCE: Primary | ICD-10-CM

## 2022-11-18 PROCEDURE — 97110 THERAPEUTIC EXERCISES: CPT | Mod: PN

## 2022-11-18 PROCEDURE — 97140 MANUAL THERAPY 1/> REGIONS: CPT | Mod: PN

## 2022-11-18 NOTE — PROGRESS NOTES
[facility]   Physical Therapy Treatment Note    Name: Kathi Baker [place]  Clinic Number: [id number]    Therapy Diagnosis:   Encounter Diagnoses   Name Primary?    Decreased functional mobility and endurance Yes    Acute midline low back pain without sciatica        Physician: Clare Pan NP    Visit Date: 11/18/2022    Physician Orders: PT Eval and Treat   Medical Diagnosis from Referral: dorsalgia   Evaluation Date: 10/18/2022  Authorization Period Expiration: 10/18/2023  Plan of Care Expiration: 01/18/2023  Visit # / Visits authorized: 8 / 12  FOTO Visit #:  1/3    PTA Visit #: 0/5     Time In: 2:30 PM  Time Out: 3:10 PM  Total Billable Time: 40 minutes    SUBJECTIVE     Pt reports: No new c/o's. I am feeling pretty good today.   She was not compliant with home exercise program as one has not been issued yet.  Response to previous treatment: I'm not having any pain right now    Functional change: performing daily activities     Pain: 0/10  Location: bilateral back      OBJECTIVE     Will provide during PN     Treatment     Kathi received the treatments listed below:      therapeutic exercises to develop strength, endurance, and flexibility for 40 minutes including:  Recumbent Bike level 5 x 15 min  Seated Lumbar flex w/ PB x 2 min  Seated H/S stretches 3 x 30 sec  Pelvic Tilts x 20  Bridges x 15  Dead bugs 5 x 2  SLR 2 x 10  S/L hip abduction x 15 each   S/L clams x 15 each   DKC w/ PB x 2 min  LTR w/ PB x 2 min   Supine Knee fallouts x 15 added YTB  Prone lumbar extension x 10  Prone - alternate arm and leg x 10   Cat/Camel x 6    Added: Treadmill x 10 mins at 1/5 mph       Manual tx to thoracic/lumbar spine x 12 mins: grade II to III extension mobilization to T6 through T9; paraspinals skin rolling; S/L flexion to left T-12 and L1/L2       Patient Education and Home Exercises     Home Exercises Provided and Patient Education Provided     Education provided: Progression in Therapy;  Also discussed with the pt's       Written Home Exercises Provided: Exercises were reviewed and Kathi was able to demonstrate them prior to the end of the session.  Kathi demonstrated good  understanding of the education provided. See EMR under Patient Instructions for exercises provided during therapy sessions    ASSESSMENT     Patient did well with exercises; VC's for technique and to remain on task. Added treadmill and a few more stabilization exercises to program,  Pt is expected to reach all goals by anticipated DC.    Kathi Is progressing well towards her goals.   Pt prognosis is Fair.     Pt will continue to benefit from skilled outpatient physical therapy to address the deficits listed in the problem list box on initial evaluation, provide pt/family education and to maximize pt's level of independence in the home and community environment.     Pt's spiritual, cultural and educational needs considered and pt agreeable to plan of care and goals.     Anticipated barriers to physical therapy: None    Goals:   Short Term Goals: 3 weeks   Able to communicate reduction in lower back pain with performance of daily activities MET  Able to demonstrate improved lumbar AROM without any increase in back pain MET  Compliant with HEP progressing     Long Term Goals: 6 weeks   Patient able to report 0/10 pain in her lower back with daily activities PROGRESSING  Able to walk x 30 mins with  without reported pain PROGRESSING  FOTO score limitation improved to 15% or less PROGRESSING  Independent with HEP for continued improvement in function PROGRESSING    PLAN     Continue per POC and progress with strengthening/mobility exercises as patient tolerates.    Meena Ramirez, PT

## (undated) DEVICE — ELECTRODE REM PLYHSV RETURN 9

## (undated) DEVICE — DILATOR CRE 10-12MM

## (undated) DEVICE — GAUZE SPONGE 4X4 12PLY

## (undated) DEVICE — JELLY KY LUBRICATING 5G PACKET

## (undated) DEVICE — SEE MEDLINE ITEM 146298

## (undated) DEVICE — SLEEVE SCD EXPRESS CALF MEDIUM

## (undated) DEVICE — DRAPE MINI C ARM STERILE

## (undated) DEVICE — NDL 18GA

## (undated) DEVICE — SUT BONE WAX 2.5 GRMS 12/BX

## (undated) DEVICE — SOL 9P NACL IRR PIC IL

## (undated) DEVICE — GLOVE SURGEONS ULTRA TOUCH 6.5

## (undated) DEVICE — DRAPE MINI C-ARM

## (undated) DEVICE — TAPE CASTING 4 X 4YDS WHT

## (undated) DEVICE — BLADE SURG #15 CARBON STEEL

## (undated) DEVICE — SOL WATER STRL IRR 1000ML

## (undated) DEVICE — PADDING CAST SPECIALIST 6X4YD

## (undated) DEVICE — NDL SURGEON MAYO #7 2/PK 72PKS

## (undated) DEVICE — SEE MEDLINE ITEM 156964

## (undated) DEVICE — PAD PREPS ALCOHOL 2-PLY LARGE

## (undated) DEVICE — GLOVE BIOGEL PI ORTHO PRO 7.5

## (undated) DEVICE — CANISTER SUCTION 3000CC

## (undated) DEVICE — COVER EQUIP 36X12 W/ELSTC BAND

## (undated) DEVICE — SEE MEDLINE ITEM 152522

## (undated) DEVICE — KIT ENDO CARRY ON PROCEDURE

## (undated) DEVICE — SUT 4-0 VICRYL / SH

## (undated) DEVICE — TOURNIQUET SB QC SP 18X4IN

## (undated) DEVICE — PAD ABD 8X10 STERILE

## (undated) DEVICE — Device

## (undated) DEVICE — GLOVE SURG ULTRA TOUCH 7

## (undated) DEVICE — SYRINGE SAFETY LOK 10CC 305559

## (undated) DEVICE — ANOSCOPE DISP W/LIGHT SOURCE

## (undated) DEVICE — SUT MONOCRYL 4-0 PS-2

## (undated) DEVICE — SPONGE DERMACEA GAUZE 4X4

## (undated) DEVICE — SYR SLIP TIP 5CC

## (undated) DEVICE — NDL HYPO REG 25G X 1 1/2